# Patient Record
Sex: FEMALE | Race: BLACK OR AFRICAN AMERICAN
[De-identification: names, ages, dates, MRNs, and addresses within clinical notes are randomized per-mention and may not be internally consistent; named-entity substitution may affect disease eponyms.]

---

## 2017-11-16 ENCOUNTER — HOSPITAL ENCOUNTER (INPATIENT)
Dept: HOSPITAL 92 - ERS | Age: 82
LOS: 33 days | Discharge: NURSING, REHAB OR LONG-TERM CARE | DRG: 304 | End: 2017-12-19
Attending: INTERNAL MEDICINE | Admitting: INTERNAL MEDICINE
Payer: MEDICARE

## 2017-11-16 VITALS — BODY MASS INDEX: 50.5 KG/M2

## 2017-11-16 DIAGNOSIS — D64.9: ICD-10-CM

## 2017-11-16 DIAGNOSIS — E87.1: ICD-10-CM

## 2017-11-16 DIAGNOSIS — G93.41: ICD-10-CM

## 2017-11-16 DIAGNOSIS — N17.0: ICD-10-CM

## 2017-11-16 DIAGNOSIS — I16.0: Primary | ICD-10-CM

## 2017-11-16 DIAGNOSIS — F41.9: ICD-10-CM

## 2017-11-16 DIAGNOSIS — E78.5: ICD-10-CM

## 2017-11-16 DIAGNOSIS — I67.4: ICD-10-CM

## 2017-11-16 DIAGNOSIS — Z96.653: ICD-10-CM

## 2017-11-16 DIAGNOSIS — Z91.041: ICD-10-CM

## 2017-11-16 DIAGNOSIS — E86.1: ICD-10-CM

## 2017-11-16 DIAGNOSIS — E53.8: ICD-10-CM

## 2017-11-16 DIAGNOSIS — M10.9: ICD-10-CM

## 2017-11-16 DIAGNOSIS — G45.9: ICD-10-CM

## 2017-11-16 DIAGNOSIS — Z74.01: ICD-10-CM

## 2017-11-16 DIAGNOSIS — I07.1: ICD-10-CM

## 2017-11-16 DIAGNOSIS — T83.511A: ICD-10-CM

## 2017-11-16 DIAGNOSIS — K59.00: ICD-10-CM

## 2017-11-16 DIAGNOSIS — G47.33: ICD-10-CM

## 2017-11-16 DIAGNOSIS — G25.0: ICD-10-CM

## 2017-11-16 DIAGNOSIS — Z91.81: ICD-10-CM

## 2017-11-16 DIAGNOSIS — E66.01: ICD-10-CM

## 2017-11-16 DIAGNOSIS — T42.3X5A: ICD-10-CM

## 2017-11-16 DIAGNOSIS — B95.2: ICD-10-CM

## 2017-11-16 DIAGNOSIS — I95.9: ICD-10-CM

## 2017-11-16 DIAGNOSIS — N18.3: ICD-10-CM

## 2017-11-16 DIAGNOSIS — J44.9: ICD-10-CM

## 2017-11-16 DIAGNOSIS — I50.32: ICD-10-CM

## 2017-11-16 DIAGNOSIS — Z88.7: ICD-10-CM

## 2017-11-16 DIAGNOSIS — M19.90: ICD-10-CM

## 2017-11-16 DIAGNOSIS — F32.9: ICD-10-CM

## 2017-11-16 DIAGNOSIS — E87.5: ICD-10-CM

## 2017-11-16 DIAGNOSIS — I13.0: ICD-10-CM

## 2017-11-16 DIAGNOSIS — R13.12: ICD-10-CM

## 2017-11-16 DIAGNOSIS — N39.0: ICD-10-CM

## 2017-11-16 DIAGNOSIS — M79.7: ICD-10-CM

## 2017-11-16 DIAGNOSIS — R47.81: ICD-10-CM

## 2017-11-16 DIAGNOSIS — R26.89: ICD-10-CM

## 2017-11-16 LAB
ALP SERPL-CCNC: 116 U/L (ref 40–150)
ALT SERPL W P-5'-P-CCNC: 14 U/L (ref 8–55)
ANION GAP SERPL CALC-SCNC: 14 MMOL/L (ref 10–20)
ANISOCYTOSIS BLD QL SMEAR: (no result) (100X)
AST SERPL-CCNC: 22 U/L (ref 5–34)
BACTERIA UR QL AUTO: (no result) HPF
BILIRUB SERPL-MCNC: 0.3 MG/DL (ref 0.2–1.2)
BUN SERPL-MCNC: 28 MG/DL (ref 9.8–20.1)
CALCIUM SERPL-MCNC: 9.1 MG/DL (ref 7.8–10.44)
CHLORIDE SERPL-SCNC: 105 MMOL/L (ref 98–107)
CO2 SERPL-SCNC: 25 MMOL/L (ref 23–31)
CREAT CL PREDICTED SERPL C-G-VRATE: 0 ML/MIN (ref 70–130)
GLOBULIN SER CALC-MCNC: 3.3 G/DL (ref 2.4–3.5)
HCT VFR BLD CALC: 36 % (ref 36–47)
HYALINE CASTS #/AREA URNS LPF: (no result) LPF
MACROCYTES BLD QL SMEAR: (no result) (100X)
PROT UR STRIP.AUTO-MCNC: 100 MG/DL
RBC # BLD AUTO: 3.48 MILL/UL (ref 4.2–5.4)
RBC UR QL AUTO: (no result) HPF (ref 0–3)
SCHISTOCYTES BLD QL SMEAR: (no result) (100X)
TROPONIN I SERPL DL<=0.01 NG/ML-MCNC: 0.02 NG/ML (ref ?–0.03)
WBC # BLD AUTO: 6.1 THOU/UL (ref 4.8–10.8)

## 2017-11-16 PROCEDURE — 93306 TTE W/DOPPLER COMPLETE: CPT

## 2017-11-16 PROCEDURE — 84100 ASSAY OF PHOSPHORUS: CPT

## 2017-11-16 PROCEDURE — 86160 COMPLEMENT ANTIGEN: CPT

## 2017-11-16 PROCEDURE — 93970 EXTREMITY STUDY: CPT

## 2017-11-16 PROCEDURE — 74000: CPT

## 2017-11-16 PROCEDURE — 86376 MICROSOMAL ANTIBODY EACH: CPT

## 2017-11-16 PROCEDURE — 86225 DNA ANTIBODY NATIVE: CPT

## 2017-11-16 PROCEDURE — 84300 ASSAY OF URINE SODIUM: CPT

## 2017-11-16 PROCEDURE — 80069 RENAL FUNCTION PANEL: CPT

## 2017-11-16 PROCEDURE — 84443 ASSAY THYROID STIM HORMONE: CPT

## 2017-11-16 PROCEDURE — 90471 IMMUNIZATION ADMIN: CPT

## 2017-11-16 PROCEDURE — 86235 NUCLEAR ANTIGEN ANTIBODY: CPT

## 2017-11-16 PROCEDURE — 82805 BLOOD GASES W/O2 SATURATION: CPT

## 2017-11-16 PROCEDURE — 90732 PPSV23 VACC 2 YRS+ SUBQ/IM: CPT

## 2017-11-16 PROCEDURE — 81001 URINALYSIS AUTO W/SCOPE: CPT

## 2017-11-16 PROCEDURE — 87186 SC STD MICRODIL/AGAR DIL: CPT

## 2017-11-16 PROCEDURE — 74230 X-RAY XM SWLNG FUNCJ C+: CPT

## 2017-11-16 PROCEDURE — 86140 C-REACTIVE PROTEIN: CPT

## 2017-11-16 PROCEDURE — 83935 ASSAY OF URINE OSMOLALITY: CPT

## 2017-11-16 PROCEDURE — 36416 COLLJ CAPILLARY BLOOD SPEC: CPT

## 2017-11-16 PROCEDURE — 80061 LIPID PANEL: CPT

## 2017-11-16 PROCEDURE — 76770 US EXAM ABDO BACK WALL COMP: CPT

## 2017-11-16 PROCEDURE — 36569 INSJ PICC 5 YR+ W/O IMAGING: CPT

## 2017-11-16 PROCEDURE — 71010: CPT

## 2017-11-16 PROCEDURE — 83930 ASSAY OF BLOOD OSMOLALITY: CPT

## 2017-11-16 PROCEDURE — 82570 ASSAY OF URINE CREATININE: CPT

## 2017-11-16 PROCEDURE — 74170 CT ABD WO CNTRST FLWD CNTRST: CPT

## 2017-11-16 PROCEDURE — 82746 ASSAY OF FOLIC ACID SERUM: CPT

## 2017-11-16 PROCEDURE — G0009 ADMIN PNEUMOCOCCAL VACCINE: HCPCS

## 2017-11-16 PROCEDURE — 83735 ASSAY OF MAGNESIUM: CPT

## 2017-11-16 PROCEDURE — 70450 CT HEAD/BRAIN W/O DYE: CPT

## 2017-11-16 PROCEDURE — 87040 BLOOD CULTURE FOR BACTERIA: CPT

## 2017-11-16 PROCEDURE — 82533 TOTAL CORTISOL: CPT

## 2017-11-16 PROCEDURE — 85025 COMPLETE CBC W/AUTO DIFF WBC: CPT

## 2017-11-16 PROCEDURE — 87086 URINE CULTURE/COLONY COUNT: CPT

## 2017-11-16 PROCEDURE — 87149 DNA/RNA DIRECT PROBE: CPT

## 2017-11-16 PROCEDURE — 82140 ASSAY OF AMMONIA: CPT

## 2017-11-16 PROCEDURE — S0028 INJECTION, FAMOTIDINE, 20 MG: HCPCS

## 2017-11-16 PROCEDURE — 81003 URINALYSIS AUTO W/O SCOPE: CPT

## 2017-11-16 PROCEDURE — 82553 CREATINE MB FRACTION: CPT

## 2017-11-16 PROCEDURE — 80048 BASIC METABOLIC PNL TOTAL CA: CPT

## 2017-11-16 PROCEDURE — 80053 COMPREHEN METABOLIC PANEL: CPT

## 2017-11-16 PROCEDURE — 94660 CPAP INITIATION&MGMT: CPT

## 2017-11-16 PROCEDURE — 93880 EXTRACRANIAL BILAT STUDY: CPT

## 2017-11-16 PROCEDURE — 83835 ASSAY OF METANEPHRINES: CPT

## 2017-11-16 PROCEDURE — 95819 EEG AWAKE AND ASLEEP: CPT

## 2017-11-16 PROCEDURE — 36415 COLL VENOUS BLD VENIPUNCTURE: CPT

## 2017-11-16 PROCEDURE — 87077 CULTURE AEROBIC IDENTIFY: CPT

## 2017-11-16 PROCEDURE — C1751 CATH, INF, PER/CENT/MIDLINE: HCPCS

## 2017-11-16 PROCEDURE — 83090 ASSAY OF HOMOCYSTEINE: CPT

## 2017-11-16 PROCEDURE — 86431 RHEUMATOID FACTOR QUANT: CPT

## 2017-11-16 PROCEDURE — 70551 MRI BRAIN STEM W/O DYE: CPT

## 2017-11-16 PROCEDURE — 95816 EEG AWAKE AND DROWSY: CPT

## 2017-11-16 PROCEDURE — 82607 VITAMIN B-12: CPT

## 2017-11-16 PROCEDURE — 81015 MICROSCOPIC EXAM OF URINE: CPT

## 2017-11-16 PROCEDURE — 84439 ASSAY OF FREE THYROXINE: CPT

## 2017-11-16 PROCEDURE — 93010 ELECTROCARDIOGRAM REPORT: CPT

## 2017-11-16 PROCEDURE — A4216 STERILE WATER/SALINE, 10 ML: HCPCS

## 2017-11-16 PROCEDURE — 94640 AIRWAY INHALATION TREATMENT: CPT

## 2017-11-16 PROCEDURE — 84484 ASSAY OF TROPONIN QUANT: CPT

## 2017-11-16 PROCEDURE — 93005 ELECTROCARDIOGRAM TRACING: CPT

## 2017-11-16 PROCEDURE — 86780 TREPONEMA PALLIDUM: CPT

## 2017-11-16 PROCEDURE — 83520 IMMUNOASSAY QUANT NOS NONAB: CPT

## 2017-11-16 NOTE — PDOC.EVN
Event Note





- Event Note


Event Note: 





Patient seen and examined. dictation note to follow

## 2017-11-16 NOTE — CT
CT HEAD NONCONTRAST

11/16/17

 

HISTORY: 

Altered mental status. 

 

COMPARISON:  

8/3/15.

 

FINDINGS:  

There is no evidence of acute intracranial hemorrhage or infarct. Diffuse cortical atrophy and chroni
c ischemic small vessel disease are similar in appearance to the previous exam. There is no mass effe
ct of shift of midline structures. The visualized paranasal sinuses remain well aerated. 

 

IMPRESSION:  

No acute intracranial abnormalities are demonstrated on noncontrast CT head. 

 

POS: [facility]

## 2017-11-17 LAB
CHOLEST SERPL-MCNC: 159 MG/DL
LDLC SERPL CALC-MCNC: 95 MG/DL

## 2017-11-17 RX ADMIN — Medication SCH ML: at 09:18

## 2017-11-17 RX ADMIN — Medication SCH ML: at 20:55

## 2017-11-17 RX ADMIN — ALBUTEROL SULFATE PRN MG: 1.25 SOLUTION RESPIRATORY (INHALATION) at 10:06

## 2017-11-17 RX ADMIN — CYANOCOBALAMIN TAB 1000 MCG SCH MCG: 1000 TAB at 09:15

## 2017-11-17 RX ADMIN — GUAIFENESIN PRN MG: 200 SOLUTION ORAL at 20:47

## 2017-11-17 RX ADMIN — GUAIFENESIN PRN MG: 200 SOLUTION ORAL at 09:30

## 2017-11-17 NOTE — DIS
DATE OF ADMISSION:  11/16/2017

 

DATE OF DISCHARGE:  11/17/2017

 

PRIMARY CARE PROVIDER:  Dr. Lias Avila.

 

DISCHARGE DISPOSITION:  Home.

 

FINAL DIAGNOSES:  Transient ischemic attack, resolved; essential hypertension; asthma; falls; dyslipi
demia; chronic kidney disease stage 3.

 

DISCHARGE MEDICATIONS:  Clonidine 0.12 mg 3 times a day, Pepcid 20 mg a day, hydrochlorothiazide 25 m
g a day, allopurinol 300 mg a day, Norvasc 5 mg a day, Coreg 6.25 mg twice a day, Lipitor 10 mg a day
, Cymbalta 60 mg a day, aspirin 325 mg a day, primidone 250 mg twice a day, losartan 100 mg a day, Xo
penex 1.25 nebs q.8 hours p.r.n., spironolactone 25 mg a day.

 

ALLERGIES:  ZOSTER VACCINE, IODINE.

 

CODE STATUS:  FULL.

 

HOSPITAL COURSE:  The patient with multiple falls recently, noticed to have some slurred speech.  In 
the emergency room, she had unremarkable CT of the brain.  Her neurological exam was normal.  Laborat
ory revealed a comp metabolic profile abnormalities, glucose 112, creatinine 1.15, BUN 28.  CBC:  Whi
te count 6.1, hemoglobin 11.3, platelet count 200,000.  Patient is alert and oriented.  Neurological 
exam was intact.  MRI shows no acute abnormality.  Carotid ultrasound shows no stenosis.  I have disc
ussed the situation with her and her family and she is being discharged with follow up with Dr. Fraser
ic in 7 days.  Consult was put into rehab for consideration of strengthening PT, OT; however, with he
r managed Medicare, this evaluation will not be complete until 5 days hence of this.  Discussed that 
she has no admitting criteria and the fact that she can be admitted to the rehab from home.  The scre
en is already in place.  I have discussed with the family.  Follow up with Dr. Avila for an attempt
 to get her into rehab.

 

CONSULTATIONS:  None.

 

PROCEDURES:  None.

## 2017-11-17 NOTE — ULT
BILATERAL CAROTID DUPLEX ULTRASOUND:

 

Date:  11/17/17 

 

HISTORY:  

TIA.  

 

FINDINGS: 

 

Real-time color Doppler evaluation of the right and left carotid systems was performed. 

 

On the right side, peak systolic velocities of the common carotid were 73 cm/second. Internal carotid
 velocities were 59 cm/second and external carotid velocities were 40 cm/second. 

 

On the left side, peak systolic velocities of the common carotid were 81  cm/second. Internal carotid
 velocities were 66 cm/second and external carotid velocities were 54 cm/second. 

 

Vertebral flow antegrade bilaterally. 

 

IMPRESSION: 

No evidence of hemodynamically significant stenosis of either internal carotid artery. 

 

 

POS: [facility]

## 2017-11-17 NOTE — MRI
EXAM:

BRAIN MRI WITHOUT CONTRAST:

 

HISTORY: 

Transient ischemic attack.  Altered mental status.

 

COMPARISON: 

None.

 

TECHNIQUE: 

Brain MRI is performed without intravenous Gadolinium administration.  Multisequential, multiplanar i
maging is performed.

 

FINDINGS: 

There is no hemorrhage on the axial gradient echo sequence.  

 

There is MR evidence of hyperostosis frontalis interna.

 

Midline brain parenchymal structures are unremarkable.

 

No parenchymal mass, mass effect, or midline shift.  Brain volume is age appropriate.  Cortical gray-
white matter differentiation is preserved.

 

Ventricles and sulci are patent and symmetric.

 

Central arterial flow voids are maintained.  Absent restricted diffusion.

 

T2 and FLAIR white matter hyperintensities due to chronic small-vessel ischemic changes of the white 
matter are noted.

 

Note is made of a partially empty sella.

 

IMPRESSION: 

1.  No acute intracranial process.

2.  Absent restricted diffusion.  No acute infarct.

3.  Age-appropriate brain volume.

4.  Chronic small-vessel ischemic change of the white matter present.

 

POS: [facility]

## 2017-11-17 NOTE — HP
DATE OF SERVICE:  11/16/2017

 

PRIMARY CARE PHYSICIAN:  Lisa Avila MD

 

REASON FOR ADMISSION:  TIA, rule out cerebrovascular accident.  Hypertension, 
uncontrolled.

 

HISTORY OF PRESENT ILLNESS:  An 83-year-old -American female who lives 
at home.  She is mostly bed bound.  She is only able to ambulate with a walker.
  For the last two weeks, she has 3 episodes of fall at different times, most 
of the time fall happens during nighttime when she goes to restroom.

 

Last Tuesday, patient had similar fall at home and subsequently patient's 
daughter noticed that she was incoherent and she was having slurred speech and 
that is why they made appointment with Dr. Avila.  Patient did not have any 
testing done and today after work, patient's daughter noticed that her 
condition was getting worse and she was having more slurred speech and she was 
more incoherent and that is why she brought her to the emergency room for 
evaluation.  In the emergency room, she had CT brain which was negative.  When 
I saw this patient at that time, the patient's speech was normal.  The patient 
did not have any focal motor weakness, but she was weak all over.  She denied 
any headaches.  She denied any chest pain, palpitation, shortness of breath.  
In the emergency room, she was hypertension.  The patient's family member was 
worried about her weakness, frequent falls and stroke.  The patient's family 
members are also worried about her cognitive status which is gradually 
declining.  She is becoming more and more forgetful.

 

Patient does not have any urinary tract infection symptoms.  She does not have 
any constipation, diarrhea, melena, hematochezia.  She does not have any fever 
or chills.  She denies any sore throat or flu-like illness.

 

ALLERGIES:  IODINE, ZOSTER VACCINE.

 

CURRENT HOME MEDICATIONS:  Aspirin 81 mg p.o. daily, clonidine 0.1 mg as needed
, Cymbalta 60 mg p.o. daily, losartan 100 mg p.o. daily, primidone 250 mg p.o. 
b.i.d., Aldactone 25 mg p.o. daily, amlodipine 5 mg p.o. daily, Coreg 6.25 mg 
p.o. b.i.d., clonidine 0.2 mg 3 times daily, Pepcid 20 mg daily.  Xopenex 
inhaler as needed, Lipitor 10 mg p.o. daily, hydrochlorothiazide 25 mg p.o. 
daily, allopurinol 300 mg p.o. daily.

 

REVIEW OF SYSTEMS:  The following complete review of systems was negative, 
unless otherwise mentioned in the HPI or below:

Constitutional:  Weight loss or gain, ability to conduct usual activities.  Skin
:  Rash, itching.  Eyes:  Double vision, pain.  ENT/Mouth:  Nose bleeding, neck 
stiffness, pain, tenderness.  Cardiovascular:  Palpitations, dyspnea on exertion
, orthopnea.  Respiratory:  Shortness of breath, wheezing, cough, hemoptysis, 
fever or night sweats.  Gastrointestinal:  Poor appetite, abdominal pain, 
heartburn, nausea, vomiting, constipation, or diarrhea.  Genitourinary:  Urgency
, frequency, dysuria, nocturia.  Musculoskeletal:  Pain, swelling.  Neurologic/
Psychiatric:  Anxiety, depression.  Allergy/Immunologic:  Skin rash, bleeding 
tendency.

 

Please see my HPI for pertinent positives and negatives.  All other review of 
systems reviewed and negative except as mentioned in the HPI.

 

PAST MEDICAL HISTORY:  Recurrent urinary tract infection, fibromyalgia, benign 
essential tremor, osteoarthritis, morbid obesity, hypertension, asthma, chronic 
bronchitis, chronic diastolic heart failure, obstructive sleep apnea, chronic 
kidney disease stage III, history of CVA, chronic physical deconditioning.

 

PAST SURGICAL HISTORY:  Hysterectomy, cholecystectomy, bilateral knee 
replacement, right shoulder surgery.

 

PAST PSYCHIATRIC HISTORY:  Anxiety and depression.

 

SOCIAL HISTORY:  Patient drinks alcohol occasionally.  She denies any smoking.  
She denies any other illicit drug abuse.  She lives at home with the family.



FAMILY HISTORY: No strong family history of CAD, CVA or cancer.

 

EMERGENCY ROOM COURSE:  Patient is given clonidine 0.1 mg.

 

PHYSICAL EXAMINATION:

VITAL SIGNS:  On arrival, blood pressure 200/77, pulse 84, respiratory rate 16, 
temperature 98.2, saturation 100% on room air, weight 111.1 kilograms.

GENERAL:  Patient is currently alert, awake, weak.  No obvious acute distress.

HEENT:  Normocephalic, atraumatic.  Eyes:  Pupils round, reactive to light.  
Extraocular muscle intact.  ENT:  Oropharynx within normal limits.  Moist 
mucous membranes.  No oral lesions.  No pharyngeal erythema, no exudate.

NECK:  Supple.  Range of motion is normal.  No meningeal signs of irritation.

LUNGS:  Clear to auscultation without any rhonchi or rales.

CARDIAC:  S1, S2 regular without any murmur.

ABDOMEN:  Soft, bowel sounds present, nontender, nondistended.  No organomegaly
, no mass, no suprapubic tenderness.

BACK:  Unremarkable, no CVA tenderness.

EXTREMITIES:  Upper extremity passive movement of all joints are normal.  Lower 
extremities:  No edema.  Good peripheral pulsation.

SKIN:  No skin rash.

HEMATOLOGICAL SYSTEM:  No lymphadenopathy.

PSYCHIATRIC:  Normal affect.

NEUROLOGIC:  Patient is alert and oriented x3.  Cranial nerves II-XII intact.  
Motor 5/5 in all four limbs.  Sensation is intact.  No pronator drift noted.  
Reflexes symmetrical.  No cerebellar sign.  Plantar bilateral flexor.  Grossly 
nonfocal neurological examination.

 

SIGNIFICANT LABORAROTORY DATA:  EKG based on my review, first degree AV block, 
no change from previous CT brain based on my review, no acute intracranial 
process.  CBC:  WBC 6.1, hemoglobin 11.3, platelet 200, .  BMP:  Sodium 
139, potassium 4.5, chloride 105, carbon dioxide 25, anion gap 14, BUN 28, 
creatinine 1.15, glucose 112, calcium 9.1.

 

LFT:  AST 22, ALT 14, alkaline phosphatase 116, albumin 3.3, CK-MB 1.1, 
troponin I 0.016.  Urinalysis:  Leukocyte esterase trace.  WBC 11-20.

 

ASSESSMENT AND PLAN:

1.  Transient ischemic attack, rule out cerebrovascular accident.  This patient'
s family member reported that she was incoherent and she was having slurred 
speech, but currently her speech is normal and she is mildly incoherent from 
her chronic cognitive deficit.  Her CT brain is negative.  All symptoms started 
on Tuesday and still CT brain is negative, so unlikely to be stroke, but family 
member was over apprehensive and they are worried about stroke and that is why 
we will keep this patient in the hospital for observation.  We will do neuro 
check every 4 hourly.  We will obtain MRI brain, carotid ultrasound, and 
echocardiography as a part of stroke workup.  We will check lipid profile, 
homocysteine, and RPR testing tomorrow.  We will also consult PT, OT while in 
hospital and we will also consult Rehab for rehab screening.

2.  Generalized weakness and chronic physical deconditioning.  The patient does 
have chronic physical deconditioning and that is why she will need PT, OT.  I 
will consult for rehab for inpatient evaluation.  If patient qualifies for 
inpatient rehabilitation, then family member and patient has agreed to go to 
rehab placement.

3.  Cognitive dysfunction.  The patient has macrocytosis.  I will check B12, 
folate, RPR.  Patient's CT brain is negative.  We are going to do MRI.  I am 
suspecting from chronic ischemic white matter changes from uncontrolled 
hypertension.  Patient may be getting senile dementia.  Patient will need 
outpatient followup.  We will start folic acid and vitamin B12 therapy and will 
try to control underlying problems with hypertension.

4.  Hypertension with hypertensive urgency.  We will control blood pressure 
with patient's home medication.  We will continue amlodipine 5 mg p.o. daily, 
Coreg 6.25 mg p.o. b.i.d., clonidine 0.2 mg 3 times daily and losartan 100 mg 
p.o. daily, hydrochlorothiazide 25 mg p.o. daily.

5.  Benign essential tremor.  We will continue primidone as per home dosage.

6.  Asthma.  We will continue albuterol nebulization as needed basis.

7.  Anxiety and depression.  We will continue Cymbalta 60 mg p.o. daily.

8.  Dyslipidemia.  Check lipid profile tomorrow and continue Lipitor 10 mg p.o. 
at bedtime.

9.  Frequent fall.  Patient will need PT, OT and possible rehab placement.

10.  Gout.  We will continue allopurinol 300 mg p.o. daily.

11.  Morbid obesity with BMI 39, weight loss education given.  Healthy 
lifestyle measures discussed with the patient.

12.  Deep venous thrombosis prophylaxis.  Lovenox 40 mg subcu daily.

13.  Gastrointestinal prophylaxis, Pepcid 20 mg p.o. b.i.d.

14.  Code status:  The patient is FULL CODE.  Patient's daughter is surrogate 
decision maker.

 

Disposition plan based on clinical course, likely within 24 hours.

 

MTDD

## 2017-11-17 NOTE — PDOC.PN
- Subjective


Encounter Start Date: 11/17/17


Encounter Start Time: 07:30


Subjective: alert, oriented, only CO -falls





- Objective


Resuscitation Status: 


 











Resuscitation Status           FULL:Full Resuscitation














MAR Reviewed: Yes


Vital Signs & Weight: 


 Vital Signs (12 hours)











  Temp Pulse Resp BP BP Pulse Ox


 


 11/17/17 06:45      168/73 H 


 


 11/17/17 05:35     201/86 H  


 


 11/17/17 04:00  97.3 F L  76  18   201/86 H  100


 


 11/17/17 01:56      171/95 H 


 


 11/17/17 01:09       99


 


 11/17/17 00:48  97.5 F L  77  20   227/109 H  99








 Weight











Weight                         250 lb














I&O: 


 











 11/16/17 11/17/17 11/18/17





 06:59 06:59 06:59


 


Intake Total  240 


 


Balance  240 











Result Diagrams: 


 11/16/17 21:22





 11/16/17 21:22





Phys Exam





- Physical Examination


Constitutional: NAD


Neck: no JVD


Respiratory: clear to auscultation bilateral


Cardiovascular: RRR, no significant murmur


Gastrointestinal: soft, positive bowel sounds


Musculoskeletal: edema present


Neurological: non-focal





Dx/Plan


(1) TIA (transient ischemic attack)


Status: Acute   





(2) Falls


Code(s): W19.XXXA - UNSPECIFIED FALL, INITIAL ENCOUNTER   Status: Acute   


Qualifiers: 


   Encounter type: initial encounter   Qualified Code(s): W19.XXXA - 

Unspecified fall, initial encounter   





(3) HTN (hypertension)


Code(s): I10 - ESSENTIAL (PRIMARY) HYPERTENSION   Status: Chronic   


Qualifiers: 


   Hypertension type: essential hypertension   Qualified Code(s): I10 - 

Essential (primary) hypertension   





(4) Dyslipidemia


Code(s): E78.5 - HYPERLIPIDEMIA, UNSPECIFIED   Status: Chronic   





- Plan


cont ASA, statin


-: MRI, carotid US pending


-: REHAB ?





* .

## 2017-11-18 RX ADMIN — ALBUTEROL SULFATE PRN MG: 1.25 SOLUTION RESPIRATORY (INHALATION) at 16:12

## 2017-11-18 RX ADMIN — Medication SCH ML: at 08:36

## 2017-11-18 RX ADMIN — CYANOCOBALAMIN TAB 1000 MCG SCH MCG: 1000 TAB at 08:35

## 2017-11-18 RX ADMIN — Medication SCH ML: at 21:04

## 2017-11-18 NOTE — PDOC.PN
- Subjective


Encounter Start Date: 11/18/17


Encounter Start Time: 15:00


Subjective: feels well but still has dizzy spells and headcahe.


-: feels paraesthesias of fingers





- Objective


Resuscitation Status: 


 











Resuscitation Status           FULL:Full Resuscitation














MAR Reviewed: Yes


Vital Signs & Weight: 


 Vital Signs (12 hours)











  Temp Pulse Pulse Pulse Resp BP BP


 


 11/18/17 11:40  97.6 F  71    16  


 


 11/18/17 10:19   68     236/102 H 


 


 11/18/17 09:55    79  82    212/104 H


 


 11/18/17 08:35   68     194/84 H 


 


 11/18/17 08:34       194/84 H 


 


 11/18/17 08:33       194/84 H 


 


 11/18/17 08:00  98.4 F  68    20  


 


 11/18/17 07:42  98.4 F  68    20  


 


 11/18/17 06:17       186/81 H 


 


 11/18/17 06:06  98.5 F  73    18  














  BP BP Pulse Ox


 


 11/18/17 11:40   192/80 H  96


 


 11/18/17 10:19   


 


 11/18/17 09:55  190/90 H  


 


 11/18/17 08:35   


 


 11/18/17 08:34   


 


 11/18/17 08:33   


 


 11/18/17 08:00   


 


 11/18/17 07:42   194/84 H  96


 


 11/18/17 06:17   


 


 11/18/17 06:06   186/81 H  96








 Weight











Weight                         250 lb














I&O: 


 











 11/17/17 11/18/17 11/19/17





 06:59 06:59 06:59


 


Intake Total 240 240 


 


Balance 240 240 











Result Diagrams: 


 11/16/17 21:22





 11/16/17 21:22


Additional Labs: 





 Laboratory Tests











  07/31/15 11/16/17 11/17/17





  08:21 21:22 04:52


 


Creatinine  1.10  1.15 H 


 


Triglycerides    74


 


Cholesterol    159


 


LDL Cholesterol, Calc    95


 


HDL Cholesterol    49


 


Vitamin B12   


 


Folate   


 


Homocysteine   


 


Syphilis IgG/IgM Ab   














  11/17/17 11/17/17 11/17/17





  04:52 04:52 04:52


 


Creatinine   


 


Triglycerides   


 


Cholesterol   


 


LDL Cholesterol, Calc   


 


HDL Cholesterol   


 


Vitamin B12  287  


 


Folate  9.60  


 


Homocysteine   10.95 


 


Syphilis IgG/IgM Ab    Nonreactive














Phys Exam





- Physical Examination


Constitutional: NAD


HEENT: PERRLA, moist MMs, sclera anicteric, oral pharynx no lesions


Neck: no nodes, no JVD, supple, full ROM


Respiratory: no wheezing, no rales, no rhonchi, clear to auscultation bilateral


Cardiovascular: RRR, no significant murmur


Gastrointestinal: soft, non-tender, no distention, positive bowel sounds


Musculoskeletal: no edema, pulses present


Neurological: non-focal, normal sensation, moves all 4 limbs


Psychiatric: normal affect, A&O x 3


Skin: no rash





Dx/Plan


(1) TIA (transient ischemic attack)


Status: Acute   





(2) Dyslipidemia


Code(s): E78.5 - HYPERLIPIDEMIA, UNSPECIFIED   Status: Chronic   





(3) HTN (hypertension)


Code(s): I10 - ESSENTIAL (PRIMARY) HYPERTENSION   Status: Chronic   


Qualifiers: 


   Hypertension type: essential hypertension   Qualified Code(s): I10 - 

Essential (primary) hypertension   





- Plan


plan discussed w/ family, out of bed/ambulate, DVT proph w/SCDs


BP still dangerously high.high risk of stroke and death.not stable for DC 


-: Increase Clonidine to 0.3 TID. Amlodipine increased yesterday


-: monitor. 


-: check am labs.


-: Will change to inpatinet with HTN urgency





* .








Review of Systems





- Review of Systems


Constitutional: Weakness.  negative: Fever, Chills, Sweats, Malaise, Other


Respiratory: negative: Cough, Dry, Shortness of Breath, Hemoptysis, SOB with 

Excertion, Pleuritic Pain, Sputum, Wheezing


Cardiovascular: negative: Chest Pain, Palpitations, Orthopnea, Paroxysmal Noc. 

Dyspnea, Edema, Light Headedness, Other


Gastrointestinal: negative: Nausea, Vomiting, Abdominal Pain, Diarrhea, 

Constipation, Melena, Hematochezia, Other


Genitourinary: negative: Dysuria, Frequency, Incontinence, Hematuria, Retention

, Other


Musculoskeletal: negative: Neck Pain, Shoulder Pain, Arm Pain, Back Pain, Hand 

Pain, Leg Pain, Foot Pain, Other


Neurological: negative: Weakness, Numbness, Incoordination, Change in Speech, 

Confusion, Seizures, Other





- Medications/Allergies


Allergies/Adverse Reactions: 


 Allergies











Allergy/AdvReac Type Severity Reaction Status Date / Time


 


iodine Allergy Severe Hives Verified 01/13/14 00:20


 


zoster vaccine live Allergy Severe Anaphylaxis Verified 01/13/14 00:20





[From ZOSTAVAX (PF)]     











Medications: 


 Current Medications





Acetaminophen (Tylenol)  650 mg PO Q4H PRN


   PRN Reason: Headache/Fever or Pain


Hydrocodone Bitart/Acetaminophen (Norco 5/325)  1 tab PO Q4H PRN


   PRN Reason: Moderate Pain (4-6)


Al Hydroxide/Mg Hydroxide (Maalox)  30 ml PO Q6H PRN


   PRN Reason: Heartburn  or Indigestion


Albuterol Sulfate (Albuterol Sulfate)  1.25 mg NEB Q8H PRN


   PRN Reason:  SOB


   Last Admin: 11/17/17 10:06 Dose:  1.25 mg


Allopurinol (Zyloprim)  300 mg PO DAILY [facility]


   Last Admin: 11/18/17 08:34 Dose:  300 mg


Amlodipine Besylate (Norvasc)  5 mg PO BID [facility]


   Last Admin: 11/18/17 08:35 Dose:  5 mg


Aspirin (Ecotrin)  325 mg PO DAILY [facility]


   Last Admin: 11/18/17 08:34 Dose:  325 mg


Atorvastatin Calcium (Lipitor)  10 mg PO HS [facility]


   Last Admin: 11/17/17 20:45 Dose:  10 mg


Carvedilol (Coreg)  6.25 mg PO BID-[facility]


   Last Admin: 11/18/17 08:33 Dose:  6.25 mg


Clonidine (Catapres)  0.1 mg PO Q4H PRN


   PRN Reason: Systolic BP > 180


   Last Admin: 11/18/17 06:17 Dose:  0.1 mg


Clonidine (Catapres)  0.3 mg PO TID [facility]


Cyanocobalamin (Vitamin B-12)  1,000 mcg PO DAILY [facility]


   Last Admin: 11/18/17 08:35 Dose:  1,000 mcg


Duloxetine HCl (Cymbalta)  60 mg PO DAILY [facility]


   Last Admin: 11/18/17 08:32 Dose:  60 mg


Enoxaparin Sodium (Lovenox)  40 mg SC 0900 [facility]


   Last Admin: 11/18/17 08:35 Dose:  40 mg


Famotidine (Pepcid)  20 mg PO BID [facility]


   Last Admin: 11/18/17 08:31 Dose:  20 mg


Folic Acid (Folvite)  1 mg PO DAILY [facility]


   Last Admin: 11/18/17 08:32 Dose:  1 mg


Guaifenesin (Robitussin Sf)  200 mg PO Q4H PRN


   PRN Reason: Cough


   Last Admin: 11/17/17 20:47 Dose:  200 mg


Hydralazine HCl (Apresoline)  10 mg SLOW IVP Q4H PRN


   PRN Reason: Systolic BP > 180


   Last Admin: 11/18/17 10:19 Dose:  10 mg


Hydrochlorothiazide (Hydrochlorothiazide)  25 mg PO DAILY [facility]


   Last Admin: 11/18/17 08:34 Dose:  25 mg


Loperamide HCl (Imodium)  2 mg PO PRN PRN


   PRN Reason: Diarrhea/Loose Stools


Loratadine (Claritin)  10 mg PO DAILYPRN PRN


   PRN Reason: Sinus Symptoms


Losartan Potassium (Cozaar)  100 mg PO DAILY [facility]


   Last Admin: 11/18/17 08:32 Dose:  100 mg


Magnesium Hydroxide (Milk Of Magnesium)  30 ml PO DAILYPRN PRN


   PRN Reason: Constipation


Ondansetron HCl (Zofran Odt)  4 mg PO Q6H PRN


   PRN Reason: Nausea/Vomiting


Ondansetron HCl (Zofran)  4 mg IVP Q6H PRN


   PRN Reason: Nausea/Vomiting


Primidone (Mysoline)  250 mg PO TID [facility]


   Last Admin: 11/18/17 10:20 Dose:  250 mg


Senna (Senokot)  2 tab PO HSPRN PRN


   PRN Reason: Constipation


Sodium Chloride (Ocean Nasal Spray 0.65%)  0 ml EA NARE QIDPRN PRN


   PRN Reason: Nasal Congestion


Sodium Chloride (Flush - Normal Saline)  10 ml IVF Q12HR [facility]


   Last Admin: 11/18/17 08:36 Dose:  10 ml


Sodium Chloride (Flush - Normal Saline)  10 ml IVF PRN PRN


   PRN Reason: Saline Flush


Spironolactone (Aldactone)  25 mg PO QAM-WM [facility]


   Last Admin: 11/18/17 08:34 Dose:  25 mg


Zolpidem Tartrate (Ambien)  5 mg PO HSPRN PRN


   PRN Reason: Insomnia

## 2017-11-19 LAB
ANION GAP SERPL CALC-SCNC: 12 MMOL/L (ref 10–20)
BUN SERPL-MCNC: 24 MG/DL (ref 9.8–20.1)
CALCIUM SERPL-MCNC: 9.2 MG/DL (ref 7.8–10.44)
CHLORIDE SERPL-SCNC: 102 MMOL/L (ref 98–107)
CO2 SERPL-SCNC: 29 MMOL/L (ref 23–31)
CREAT CL PREDICTED SERPL C-G-VRATE: 78 ML/MIN (ref 70–130)
HYALINE CASTS #/AREA URNS LPF: (no result) LPF
PROT UR STRIP.AUTO-MCNC: 100 MG/DL
RBC UR QL AUTO: (no result) HPF (ref 0–3)
WBC UR QL AUTO: (no result) HPF (ref 0–3)

## 2017-11-19 RX ADMIN — ALBUTEROL SULFATE PRN MG: 1.25 SOLUTION RESPIRATORY (INHALATION) at 07:15

## 2017-11-19 RX ADMIN — CYANOCOBALAMIN TAB 1000 MCG SCH MCG: 1000 TAB at 08:17

## 2017-11-19 RX ADMIN — Medication SCH ML: at 08:17

## 2017-11-19 RX ADMIN — Medication SCH ML: at 21:10

## 2017-11-19 RX ADMIN — HYDROCODONE BITARTRATE AND ACETAMINOPHEN PRN TAB: 5; 325 TABLET ORAL at 08:03

## 2017-11-19 NOTE — CON
DATE OF CONSULTATION:  11/19/2017

 

REASON FOR CONSULTATION:  Uncontrolled hypertension.

 

PATIENT OF:  Dr. Cr Stack.

 

HISTORY OF PRESENT ILLNESS:  Ms. Leach is an 83-year-old woman.  She was brought to the hospital and
 seen here on 11/17/2017, with a possible transient ischemia attack and uncontrolled hypertension.  I
t stated that the patient is mostly bedbound, ambulating with a walker and she was falling multiple t
imes.  Family noted at that time she was incoherent and had slurred speech.

 

She was brought to the emergency room for further evaluation.

 

ALLERGIES:  The patient is allergic to IODINE and ZOSTER VACCINE.

 

MEDICATIONS:

1.  Aspirin.

2.  Clonidine 0.1 mg as needed.

3.  Losartan 100 mg daily.

4.  Primidone.

5.  Coreg 6.25 mg twice a day.

6.  Clonidine 0.2 mg 3 times a day.

7.  Pepcid 20 mg a day.

8.  Lipitor 10 mg daily.

9.  Hydrochlorothiazide.

10.  Allopurinol.

 

REVIEW OF SYSTEMS:

Constitutional:  No significant weight gain or loss and she is very overweight.  Vision:  No changes.
  Hearing:  No changes.  Pulmonary:  No cough or wheezing.  Gastrointestinal:  No nausea, vomiting, d
iarrhea.  Skin:  No rashes.  Neurologic:  No unilateral weakness or numbness.

 

HOSPITAL COURSE:  The patient apparently was thought not to have a transient ischemic attack after ev
aluation, but she has very labile blood pressure with difficult to control pressure.  Examination of 
blood pressure has been extremely variable at 184/76 and was then over 200 systolic.  On 11/17, it wa
s 227/109.  On examination now, the patient is very sleepy.  It seems like apparently she gets much s
leep here after the dose of clonidine 0.3 mg, apparently this is ordered 0.3 mg 3 times a day.

 

PHYSICAL EXAMINATION:

GENERAL:  She is awake since she goes to sleep quickly.  We came back in a few minutes later, we got 
all the lights on, the nurses around and she woke up more completely and said she felt okay, had no c
hest pain or pressure.

LUNGS:  Clear.

CARDIAC:  Normal S1 and S2.

ABDOMEN:  Soft, nontender.

EXTREMITIES:  No clubbing or cyanosis.  There is mild to moderate edema.

SKIN:  Warm and dry.

PSYCHIATRIC:  Mood and affect are normal.

NEUROLOGIC:  Moves all extremities.

 

PERTINENT LABORATORY:  Creatinine 1.15, LDL 95.  Patient has also had cardiac catheterization done in
 the past, showing no obstructive coronary artery disease.

 

ASSESSMENT:  Labile hypertension.  She is very sleepy now, probably related to clonidine I suspect.

 

PLAN:

1.  Change from losartan to Benicar, stronger antigens receptor blocker.

2.  Agree with increasing amlodipine.

3.  Agree with increasing carvedilol.

4.  We will need to stop the clonidine now until she wakes up, may be resumed at a lower dose if need
ed.  Dr. Stack will resume care tomorrow.

## 2017-11-19 NOTE — PDOC.PN
- Subjective


Encounter Start Date: 11/19/17


Encounter Start Time: 10:00


Subjective: FEEL BETTER TODAY , NO NEW COMPLAINTS BM THIS MORNING





- Objective


MAR Reviewed: Yes


Vital Signs & Weight: 


 Vital Signs (12 hours)











  Temp Pulse Resp BP Pulse Ox


 


 11/19/17 08:00  98.4 F  85  16  182/96 H  96


 


 11/19/17 07:15   85  18   98


 


 11/19/17 04:16  98.1 F  75  18  146/76 H  95


 


 11/19/17 02:16   81   133/71 


 


 11/19/17 00:00  98.2 F  68  16  189/83 H  98








 Weight











Weight                         252 lb 4.8 oz














I&O: 


 











 11/18/17 11/19/17 11/20/17





 06:59 06:59 06:59


 


Intake Total  1005 


 


Output Total  950 


 


Balance  55 











Result Diagrams: 


 11/16/17 21:22





 11/19/17 04:41





Phys Exam





- Physical Examination


Constitutional: NAD


HEENT: PERRLA, moist MMs, sclera anicteric


Neck: supple, full ROM


Respiratory: no wheezing, no rhonchi


Cardiovascular: RRR


Gastrointestinal: soft, non-tender, positive bowel sounds


Musculoskeletal: edema present


Neurological: non-focal, moves all 4 limbs


Psychiatric: normal affect, A&O x 3


Deviation from normal: MX BRUISES 





Dx/Plan


(1) Falls


Code(s): W19.XXXA - UNSPECIFIED FALL, INITIAL ENCOUNTER   Status: Acute   


Qualifiers: 


   Encounter type: initial encounter   Qualified Code(s): W19.XXXA - 

Unspecified fall, initial encounter   





(2) TIA (transient ischemic attack)


Status: Acute   





(3) Dyslipidemia


Code(s): E78.5 - HYPERLIPIDEMIA, UNSPECIFIED   Status: Chronic   





(4) HTN (hypertension)


Code(s): I10 - ESSENTIAL (PRIMARY) HYPERTENSION   Status: Chronic   


Qualifiers: 


   Hypertension type: essential hypertension   Qualified Code(s): I10 - 

Essential (primary) hypertension   





- Plan


cont current plan of care, plan discussed w/ family, PT/OT


WILL HAVE HERE CARDIOLOGIST DR. BARROSO REVIEW BP MEDS


-: AS PATIENT HAS LABILE BP AND H/O MX FALLS.





* .

## 2017-11-20 RX ADMIN — HYDROCODONE BITARTRATE AND ACETAMINOPHEN PRN TAB: 5; 325 TABLET ORAL at 08:34

## 2017-11-20 RX ADMIN — CYANOCOBALAMIN TAB 1000 MCG SCH MCG: 1000 TAB at 08:32

## 2017-11-20 RX ADMIN — HYDROCODONE BITARTRATE AND ACETAMINOPHEN PRN TAB: 5; 325 TABLET ORAL at 01:32

## 2017-11-20 RX ADMIN — HYDROCODONE BITARTRATE AND ACETAMINOPHEN PRN TAB: 5; 325 TABLET ORAL at 14:40

## 2017-11-20 RX ADMIN — Medication SCH ML: at 20:23

## 2017-11-20 RX ADMIN — HYDROCODONE BITARTRATE AND ACETAMINOPHEN PRN TAB: 5; 325 TABLET ORAL at 21:35

## 2017-11-20 RX ADMIN — Medication SCH ML: at 08:41

## 2017-11-20 NOTE — PRG
DATE OF SERVICE:  11/20/2017

 

SUBJECTIVE:  Ms. aPtel blood pressure continues to be elevated.  She recently was admitted for TIA-
like symptoms, but likely represented a hypertensive crisis and encephalopathy.

 

OBJECTIVE: 

VITAL SIGNS:  Blood pressure 186/80, pulse 85, temperature 98.5.

LUNGS:  Clear to auscultation.

CARDIAC:  Regular rate and rhythm.

ABDOMEN:  Soft, nontender, nondistended.

EXTREMITIES:  No edema.

 

MEDICATIONS:  Current blood pressure medications include Norvasc 5 mg b.i.d., carvedilol 25 b.i.d., c
lonidine 0.3 mg one p.o. t.i.d., losartan 100 mg q.a.m., hydrochlorothiazide 25 daily, and spironolac
tone 25 daily.

 

IMPRESSION:  Hypertensive crisis.

 

RECOMMENDATIONS:

1.  Add hydralazine and also decreasing clonidine.  She has had intermittent somnolence.

2.  Consider increasing hydralazine or adding minoxidil.

3.  CK secondary cause.  We will check her urine metanephrines in addition to a CT scan to assess for
 renal artery stenosis.

## 2017-11-20 NOTE — PDOC.PN
- Subjective


Encounter Start Date: 11/20/17


Encounter Start Time: 10:15


Subjective: Patient with HA, vomiting, abdominal pain overnight. BP spiked 

again this 


-: AM. Had another fall overnight without injury. Happening regularly at home


-: as well. ? need SNF?





- Objective


MAR Reviewed: Yes


Vital Signs & Weight: 


 Vital Signs (12 hours)











  Temp Pulse Resp BP BP Pulse Ox


 


 11/20/17 08:31   108 H   237/80 H  


 


 11/20/17 08:00  98.6 F   20   237/80 H  95


 


 11/20/17 04:00  97.6 F  89  24 H   146/79 H  97


 


 11/20/17 01:20   89    167/91 H 


 


 11/20/17 00:30  97.8 F  82  20   189/93 H  94 L








 Weight











Weight                         246 lb 11.2 oz














I&O: 


 











 11/19/17 11/20/17 11/21/17





 06:59 06:59 06:59


 


Intake Total 1005 1015 


 


Output Total 950  


 


Balance 55 1015 











Result Diagrams: 


 11/16/17 21:22





 11/19/17 04:41





Phys Exam





- Physical Examination


Constitutional: NAD


HEENT: moist MMs


Respiratory: no wheezing, no rales, no rhonchi


Cardiovascular: RRR, no significant murmur


Gastrointestinal: soft, positive bowel sounds


TTP periumbilical, no guarding or masses, normal bowel sounds


Musculoskeletal: no edema


Neurological: non-focal, moves all 4 limbs


Psychiatric: normal affect, A&O x 3





Dx/Plan


(1) Hypertensive urgency, malignant


Code(s): I16.0 - HYPERTENSIVE URGENCY   Status: Chronic   Comment: BP still 

spiking to 230s this AM. Continue titrating BP meds up. Appreciate cardiology 

imput.   





(2) Falls


Code(s): W19.XXXA - UNSPECIFIED FALL, INITIAL ENCOUNTER   Status: Acute   


Qualifiers: 


   Encounter type: initial encounter   Qualified Code(s): W19.XXXA - 

Unspecified fall, initial encounter   





(3) TIA (transient ischemic attack)


Status: Acute   





(4) Dyslipidemia


Code(s): E78.5 - HYPERLIPIDEMIA, UNSPECIFIED   Status: Chronic   





(5) Vomiting


Code(s): R11.10 - VOMITING, UNSPECIFIED   Status: Acute   


Plan: 


Single time, associated with constipation








(6) Constipation


Code(s): K59.00 - CONSTIPATION, UNSPECIFIED   Status: Acute   


Plan: 


Start stool softeners








- Plan


cont current plan of care, PT/OT


increase carvedilol to 25 BID


-: Patient may benefit from SNF with her recurrent falls





* .








- Discharge Day


Encounter end time: 10:45

## 2017-11-21 RX ADMIN — Medication SCH ML: at 08:25

## 2017-11-21 RX ADMIN — Medication SCH ML: at 20:54

## 2017-11-21 RX ADMIN — HYDROCODONE BITARTRATE AND ACETAMINOPHEN PRN TAB: 5; 325 TABLET ORAL at 08:25

## 2017-11-21 RX ADMIN — CYANOCOBALAMIN TAB 1000 MCG SCH MCG: 1000 TAB at 08:23

## 2017-11-21 RX ADMIN — HYDROCODONE BITARTRATE AND ACETAMINOPHEN PRN TAB: 5; 325 TABLET ORAL at 01:31

## 2017-11-21 NOTE — PDOC.PN
- Subjective


Encounter Start Date: 11/21/17


Encounter Start Time: 08:30


Subjective: HA improved this AM, just a dull pressure now. No more N/V. Abd pain


-: better. Still no stool.





- Objective


MAR Reviewed: Yes


Vital Signs & Weight: 


 Vital Signs (12 hours)











  Temp Pulse Resp BP BP Pulse Ox


 


 11/21/17 05:25   92    176/88 H 


 


 11/21/17 03:06  97.9 F  80  18   168/66 H  97


 


 11/21/17 01:30   80    184/74 H 


 


 11/20/17 23:39      198/76 H 


 


 11/20/17 23:19  97.7 F  94  16   212/72 H  96


 


 11/20/17 21:30   86   189/99 H  


 


 11/20/17 20:40  97.8 F  88  16    97


 


 11/20/17 20:00  97.8 F  88  16   204/92 H  97








 Weight











Weight                         247 lb 6.4 oz














I&O: 


 











 11/20/17 11/21/17 11/22/17





 06:59 06:59 06:59


 


Intake Total 1015 870 


 


Output Total  700 


 


Balance 1015 170 











Result Diagrams: 


 11/16/17 21:22





 11/19/17 04:41





Phys Exam





- Physical Examination


Constitutional: NAD


HEENT: moist MMs


Respiratory: no wheezing, no rales, no rhonchi, clear to auscultation bilateral


Cardiovascular: RRR, no significant murmur


Gastrointestinal: soft, positive bowel sounds


Neurological: non-focal, moves all 4 limbs


Psychiatric: normal affect, A&O x 3





Dx/Plan


(1) Hypertensive urgency, malignant


Code(s): I16.0 - HYPERTENSIVE URGENCY   Status: Chronic   Comment: BP not 

spiking this AM. Continue titrating BP meds up. Appreciate cardiology imput. 

Urinary metanephrines and CT for VERN is pending. Patient iodine allergic so 

receiving pretreatment protocol.   





(2) Falls


Code(s): W19.XXXA - UNSPECIFIED FALL, INITIAL ENCOUNTER   Status: Acute   


Qualifiers: 


   Encounter type: initial encounter   Qualified Code(s): W19.XXXA - 

Unspecified fall, initial encounter   





(3) TIA (transient ischemic attack)


Status: Resolved   Comment: Likely hypertensive emergency causing the neuro 

symptoms   





(4) Dyslipidemia


Code(s): E78.5 - HYPERLIPIDEMIA, UNSPECIFIED   Status: Chronic   





(5) Vomiting


Code(s): R11.10 - VOMITING, UNSPECIFIED   Status: Acute   





(6) Constipation


Code(s): K59.00 - CONSTIPATION, UNSPECIFIED   Status: Acute   





- Plan


cont current plan of care, PT/OT, DVT proph w/lovenox, DVT proph w/SCDs


BP improved, d/c home once cardiology happy with medication regimen and 


-: CT and urine metanephrines completed.





* .








- Discharge Day


Encounter end time: 09:50

## 2017-11-21 NOTE — CT
CT ANGIOGRAM ABDOMEN WITH AND WITHOUT IV CONTRAST AND 3D RECONSTRUCTIONS:

 

11/21/2017

 

HISTORY:

Malignant hypertension.

 

COMPARISON:

Noncontrast CT abdomen on 02/23/2017.

 

FINDINGS:

Atherosclerotic vascular calcifications are seen in the abdominal aorta and involving the iliac arter
ies; however, the abdominal aorta is normal in caliber without evidence of an aortic dissection.  The
 abdominal aorta is mildly tortuous.

 

There are single renal arteries bilaterally.  There is mild narrowing at the origin of the left renal
 artery, primarily related to atherosclerotic vascular calcifications.  There are also vascular calci
fications involving the proximal aspect of the right renal artery, which limits evaluation of the lum
en.  However, there is at least mild and possibly moderate narrowing involving the origin of the prox
imal right renal artery.

 

There is mild narrowing at the origin of the celiac artery.  The superior mesenteric artery is patent
.  The origin of the HOA is obscured by prominent vascular calcifications.

 

There is an 11 mm, exophytic, hypodense lesion demonstrating fluid attenuation in the superior pole, 
right kidney, most consistent with a cyst.  A subcentimeter, too-small-to-characterize, hypodense les
ion is seen in the mid portion left kidney.  There are minimal scattered areas of scarring involving 
each kidney.

 

Post cholecystectomy changes are noted.  The common duct is more dilated than on the prior noncontras
marcelle study on February 23,2017, with an area of dilatation in the region of the pancreatic head, measu
ring at least 2.1 cm.  While there are post cholecystectomy changes, this is more dilated than expect
ed.

 

There is thickening of each adrenal gland, likely related to adrenal hyperplasia.  There is suggestio
n of a subcentimeter nodular density involving the lateral limb of the left adrenal gland, but this d
emonstrates an attenuation coefficient that would be more consistent with an adrenal adenoma.

 

There is a 1.6 cm hypodense, cystic appearing lesion in the mid portion of the body of the pancreas.

 

The liver and spleen demonstrate a normal CT appearance for the arterial phase of imaging.

 

Multilevel prominent degenerative changes are seen in the lumbar spine.

 

There is colonic diverticulosis.

 

IMPRESSION:

1.  Mild narrowing at the origin of the left renal artery, with difficulty in visualization of the or
igin of the right renal artery due to atherosclerotic vascular calcifications, but there is at least 
mild and possibly moderate narrowing at the origin of the single right renal artery.

 

2.  Atherosclerotic vascular calcifications in the abdominal aorta and at the origin of the mesenteri
c vessels.

 

3.  Post cholecystectomy changes, but the extrahepatic common duct is more dilated than expected, stephanie
suring 2 cm, and this is more dilated than on the prior exam on 02/23/2017.  The exact etiology is un
certain.  A gastroenterology consultation may be helpful for further evaluation.

 

4.  Hypodense pancreatic cystic lesion, measuring 1.6 cm.  This is difficult to characterize.  Given 
the small size, a six month to one year follow-up evaluation is recommended for further evaluation.

 

5.  The remainder of the findings are as described above.

 

POS: MARGARITA

## 2017-11-21 NOTE — PRG
DATE OF SERVICE:  11/21/2017

 

SUBJECTIVE:  Ms. Leach' blood pressure appears to be somewhat better today.  Her CT scan suggested m
ild narrowing of the left renal artery with poor visualization of the right renal artery.  There was 
mild to moderate narrowing likely.

 

OBJECTIVE:

VITAL SIGNS:  Current blood pressure 160/71, pulse 76 and temperature 98.1.

LUNGS:  Clear.

HEART:  Regular rate and rhythm.

ABDOMEN:  Soft, nontender and nondistended.

EXTREMITIES:  No edema.

 

IMPRESSION:  Malignant hypertension.

 

RECOMMENDATIONS:  I have changed hydrochlorothiazide to chlorthalidone.  We will also increase hydral
azine to 50 mg p.o. t.i.d.  We will await urine collection for metanephrines, which should not postpo
ne her discharge if her blood pressure is better controlled.  We would also consider adding minoxidil
 if needed.

## 2017-11-22 LAB
ANION GAP SERPL CALC-SCNC: 13 MMOL/L (ref 10–20)
BUN SERPL-MCNC: 38 MG/DL (ref 9.8–20.1)
CALCIUM SERPL-MCNC: 8.5 MG/DL (ref 7.8–10.44)
CHLORIDE SERPL-SCNC: 96 MMOL/L (ref 98–107)
CO2 SERPL-SCNC: 26 MMOL/L (ref 23–31)
CREAT CL PREDICTED SERPL C-G-VRATE: 47 ML/MIN (ref 70–130)
HCT VFR BLD CALC: 33.7 % (ref 36–47)
RBC # BLD AUTO: 3.35 MILL/UL (ref 4.2–5.4)
WBC # BLD AUTO: 8.1 THOU/UL (ref 4.8–10.8)

## 2017-11-22 RX ADMIN — Medication SCH ML: at 08:38

## 2017-11-22 RX ADMIN — Medication SCH ML: at 21:22

## 2017-11-22 RX ADMIN — CYANOCOBALAMIN TAB 1000 MCG SCH MCG: 1000 TAB at 08:33

## 2017-11-22 NOTE — PDOC.PN
- Subjective


Encounter Start Date: 11/22/17


Encounter Start Time: 08:50





states she is doing alright. only complaint is a persistent headache. denies 

sob or chest pain 





- Objective


Vital Signs & Weight: 


 Vital Signs (12 hours)











  Temp Pulse Resp BP BP Pulse Ox


 


 11/22/17 08:33   78    


 


 11/22/17 07:46  98.7 F  78  20   161/70 H  92 L


 


 11/22/17 06:23      152/58 H 


 


 11/22/17 05:06     186/78 H  


 


 11/22/17 04:37  98.4 F  83  16   186/78 H  97


 


 11/22/17 00:50  100.1 F H  85  18   150/67 H  98








 Weight











Weight                         247 lb














I&O: 


 











 11/21/17 11/22/17 11/23/17





 06:59 06:59 06:59


 


Intake Total 870 800 


 


Output Total 700 500 


 


Balance 170 300 











Result Diagrams: 


 11/22/17 04:00





 11/22/17 04:00





Phys Exam





- Physical Examination


HEENT: PERRLA, moist MMs, sclera anicteric


Neck: no nodes, no JVD, supple


Respiratory: no wheezing, no rales, no rhonchi


Cardiovascular: RRR


Gastrointestinal: soft, non-tender, no distention, positive bowel sounds


Musculoskeletal: pulses present


Neurological: non-focal, normal sensation, moves all 4 limbs


Psychiatric: normal affect, A&O x 3





Dx/Plan


(1) Acute kidney injury


Code(s): N17.9 - ACUTE KIDNEY FAILURE, UNSPECIFIED   Status: Acute   





(2) Falls


Code(s): W19.XXXA - UNSPECIFIED FALL, INITIAL ENCOUNTER   Status: Acute   


Qualifiers: 


   Encounter type: initial encounter   Qualified Code(s): W19.XXXA - 

Unspecified fall, initial encounter   





(3) Vomiting


Code(s): R11.10 - VOMITING, UNSPECIFIED   Status: Acute   





(4) Hypertensive urgency, malignant


Code(s): I16.0 - HYPERTENSIVE URGENCY   Status: Chronic   Comment: BP not 

spiking this AM. Continue titrating BP meds up. Appreciate cardiology imput. 

Urinary metanephrines and CT for VERN is pending. Patient iodine allergic so 

receiving pretreatment protocol.   





- Plan


cont current plan of care





* .


being that her creatnine jumped from .9 to 1.6 in 24hrs, will hold a couple of 

her BP meds that can affect renal function. I think its best that we recheck 

her creatnine in the a.m. to make sure the creatnine doesnt continue to rise 

especially with the agents that she is on. if better, she can go home tmrw 

morning

## 2017-11-22 NOTE — PRG
DATE OF SERVICE:  11/22/2017

 

SUBJECTIVE:  Ms. Leach is doing well.  Her blood pressure seems to be improving.  Her blood pressure
 was 152 this morning in addition 161 systolic.  She is scheduled for SNU transfer.

 

OBJECTIVE:

VITAL SIGNS:  Blood pressure 161/70, pulse 78, temperature 98.7.

LUNGS:  Clear to auscultation.

CARDIAC:  Regular rate and rhythm.

ABDOMEN:  Soft, nontender, nondistended.

EXTREMITIES:  No edema.

 

IMPRESSION:  Malignant hypertension.

 

RECOMMENDATIONS:  I have increased her hydralazine from 50 mg t.i.d. to 75 mg t.i.d.  She will also g
et her first dose of chlorthalidone today.  From my standpoint, as long as her blood pressure is stab
le, it would be okay for discharge to SNU with a close outpatient followup within the next 1-2 weeks.

## 2017-11-23 LAB
ANION GAP SERPL CALC-SCNC: 14 MMOL/L (ref 10–20)
BUN SERPL-MCNC: 46 MG/DL (ref 9.8–20.1)
CALCIUM SERPL-MCNC: 8.3 MG/DL (ref 7.8–10.44)
CHLORIDE SERPL-SCNC: 93 MMOL/L (ref 98–107)
CO2 SERPL-SCNC: 23 MMOL/L (ref 23–31)
CREAT CL PREDICTED SERPL C-G-VRATE: 36 ML/MIN (ref 70–130)

## 2017-11-23 RX ADMIN — CYANOCOBALAMIN TAB 1000 MCG SCH MCG: 1000 TAB at 08:35

## 2017-11-23 RX ADMIN — Medication SCH ML: at 21:53

## 2017-11-23 RX ADMIN — Medication SCH ML: at 08:36

## 2017-11-23 NOTE — PDOC.PN
- Subjective


Encounter Start Date: 11/23/17


Encounter Start Time: 10:15





Patient seen at bedside. No overnight events. BP is still elevated, no HA 

reported.





- Objective


MAR Reviewed: Yes


Vital Signs & Weight: 


 Vital Signs (12 hours)











  Temp Pulse Resp BP Pulse Ox


 


 11/23/17 08:35   80   


 


 11/23/17 08:34   80   


 


 11/23/17 08:00  98.3 F  80  20  179/63 H  97


 


 11/23/17 04:17  98.8 F  86  16  127/80  97


 


 11/23/17 00:31  97.6 F  78  18  138/62  97








 Weight











Weight                         246 lb 11.2 oz














I&O: 


 











 11/22/17 11/23/17 11/24/17





 06:59 06:59 06:59


 


Intake Total 800 130 


 


Output Total 500  


 


Balance 300 130 











Result Diagrams: 


 11/22/17 04:00





 11/23/17 04:18


Additional Labs: 


 Accuchecks











  11/22/17





  10:39


 


POC Glucose  176 H














Phys Exam





- Physical Examination


Constitutional: NAD


HEENT: moist MMs


Neck: no JVD


Respiratory: clear to auscultation bilateral


Cardiovascular: RRR


Gastrointestinal: soft


Musculoskeletal: pulses present


Neurological: non-focal


Psychiatric: A&O x 3





Dx/Plan


(1) Acute kidney injury


Code(s): N17.9 - ACUTE KIDNEY FAILURE, UNSPECIFIED   Status: Acute   





(2) Constipation


Code(s): K59.00 - CONSTIPATION, UNSPECIFIED   Status: Acute   





(3) Falls


Code(s): W19.XXXA - UNSPECIFIED FALL, INITIAL ENCOUNTER   Status: Acute   


Qualifiers: 


   Encounter type: subsequent encounter   Qualified Code(s): W19.XXXD - 

Unspecified fall, subsequent encounter   





(4) Dyslipidemia


Code(s): E78.5 - HYPERLIPIDEMIA, UNSPECIFIED   Status: Chronic   





(5) Hypertensive urgency, malignant


Code(s): I16.0 - HYPERTENSIVE URGENCY   Status: Chronic   





(6) TIA (transient ischemic attack)


Status: Resolved   Comment: Likely hypertensive emergency causing the neuro 

symptoms   





- Plan


cont current plan of care, plan discussed w/ family, PT/OT, 





* Change amlodipine to Procardia.


* Increase Hydralazine


* Hold ARB and spironolactone


* Consult Nephrology for CAPRI


* Once creatinine improves, D/C to swing bed

## 2017-11-24 LAB
ANION GAP SERPL CALC-SCNC: 11 MMOL/L (ref 10–20)
BACTERIA UR QL AUTO: (no result) HPF
BUN SERPL-MCNC: 50 MG/DL (ref 9.8–20.1)
BUN/CREAT SERPL: 22.83
CALCIUM SERPL-MCNC: 8.4 MG/DL (ref 7.8–10.44)
CHLORIDE SERPL-SCNC: 90 MMOL/L (ref 98–107)
CO2 SERPL-SCNC: 27 MMOL/L (ref 23–31)
CREAT CL PREDICTED SERPL C-G-VRATE: 35 ML/MIN (ref 70–130)
HYALINE CASTS #/AREA URNS LPF: (no result) LPF
PROT UR STRIP.AUTO-MCNC: 100 MG/DL
RBC UR QL AUTO: (no result) HPF (ref 0–3)
WBC UR QL AUTO: (no result) HPF (ref 0–3)

## 2017-11-24 RX ADMIN — HYDROCODONE BITARTRATE AND ACETAMINOPHEN PRN TAB: 5; 325 TABLET ORAL at 19:55

## 2017-11-24 RX ADMIN — Medication SCH ML: at 19:57

## 2017-11-24 RX ADMIN — Medication SCH ML: at 08:08

## 2017-11-24 RX ADMIN — NIFEDIPINE SCH MG: 60 TABLET, EXTENDED RELEASE ORAL at 08:08

## 2017-11-24 RX ADMIN — CYANOCOBALAMIN TAB 1000 MCG SCH MCG: 1000 TAB at 08:08

## 2017-11-24 NOTE — PDOC.PN
- Subjective


Encounter Start Date: 11/24/17


Encounter Start Time: 09:56





Patient seen at bedside. No overnight events. Had a BM after enema 

administration, no new complaints.





- Objective


MAR Reviewed: Yes


Vital Signs & Weight: 


 Vital Signs (12 hours)











  Temp Pulse Resp BP BP Pulse Ox


 


 11/24/17 09:16   77    129/62 


 


 11/24/17 08:08   85    


 


 11/24/17 08:07   85    


 


 11/24/17 07:50  97.7 F  85  16   228/91 H  95


 


 11/24/17 06:34   94    200/70 H 


 


 11/24/17 06:28   94   200/70 H  


 


 11/24/17 04:57     212/78 H  


 


 11/24/17 04:25  97.5 F L  81  20   218/80 H  98


 


 11/24/17 00:44  97.5 F L  95  20   176/71 H  95








 Weight











Weight                         250 lb 14.4 oz














I&O: 


 











 11/23/17 11/24/17 11/25/17





 06:59 06:59 06:59


 


Intake Total 130 620 


 


Balance 130 620 











Result Diagrams: 


 11/22/17 04:00





 11/24/17 04:22





Phys Exam





- Physical Examination


Constitutional: NAD


HEENT: moist MMs


Neck: no JVD


Respiratory: clear to auscultation bilateral


Cardiovascular: RRR


Gastrointestinal: soft


Musculoskeletal: pulses present


Neurological: moves all 4 limbs


Psychiatric: A&O x 3





Dx/Plan


(1) Acute kidney injury


Code(s): N17.9 - ACUTE KIDNEY FAILURE, UNSPECIFIED   Status: Acute   





(2) Constipation


Code(s): K59.00 - CONSTIPATION, UNSPECIFIED   Status: Acute   





(3) Falls


Code(s): W19.XXXA - UNSPECIFIED FALL, INITIAL ENCOUNTER   Status: Acute   


Qualifiers: 


   Encounter type: subsequent encounter   Qualified Code(s): W19.XXXD - 

Unspecified fall, subsequent encounter   





(4) Dyslipidemia


Code(s): E78.5 - HYPERLIPIDEMIA, UNSPECIFIED   Status: Chronic   





(5) Hypertensive urgency, malignant


Code(s): I16.0 - HYPERTENSIVE URGENCY   Status: Chronic   





(6) TIA (transient ischemic attack)


Status: Resolved   Comment: Likely hypertensive emergency causing the neuro 

symptoms   





(7) Hyponatremia


Code(s): E87.1 - HYPO-OSMOLALITY AND HYPONATREMIA   Status: Acute   





- Plan


cont current plan of care, PT/OT, 





* BP remains labile with worsening Renal Function. Will add Imdur for hopeful 

better control


* D/C Spironolactone due to CAPRI


* 250 cc NS bolus


* Check osmolality


* Daily Labs


*

## 2017-11-24 NOTE — CT
HEAD CT WITHOUT CONTRAST:

 

Date:  11/24/17 

 

COMPARISON:  

11/16/17. 

 

HISTORY:  

Dizziness. 

 

TECHNIQUE:  

Serial axial CT imaging at 5 mm intervals from vertex through skull base without contrast. 

 

FINDINGS:

The imaged paranasal sinuses/mastoid air cells are well aerated. 

 

There is no displaced calvarial fracture. There is atherosclerotic calcification of the cavernous car
otid arteries. No intracranial hemorrhage, midline shift, mass effect, or ventricular enlargement. 

 

IMPRESSION: 

No acute findings.  

 

 

 

POS: MARGARITA

## 2017-11-24 NOTE — CON
DATE OF CONSULTATION:  11/24/2017

 

RENAL MEDICINE

 

HISTORY OF PRESENT ILLNESS:  Ms. Leach is an 83-year-old black female who was initially admitted for
 TIA/CVA.  We are being consulted for her acute kidney injury.  During this hospitalization, creatini
ne noted to have gone up.  Her medications were reviewed by the hospitalist, and _____ has now been d
iscontinued.  I ordered some urinalysis and urine chemistries.  Urinalysis and urine chemistry sugges
tive of hemodynamically mediated renal dysfunction.  This morning, she is feeling a little better.  S
he voices no new complaints.

 

REVIEW OF SYSTEMS:  No chest pain, no shortness of breath, no nausea, no vomiting, no diarrhea.  Appe
tite is fair.  Energy level is somewhat decreased.  No headache, no diplopia, no syncopal episode, no
 productive cough, no fever or chills.  No gross hematuria, no hematochezia, no melena, no hematemesi
s.  No joint pains.  No new skin rash.

 

MEDICATIONS:  Zyloprim 300 mg daily, Ecotrin 325 mg daily, Lipitor 10 mg tablet at bedtime, carvedilo
l 25 mg p.o. b.i.d., clonidine 0.1 mg q.4 p.r.n., Coreg 25 mg b.i.d., Lovenox 40 mg subcu every day, 
folic acid 1 mg once a day, Imdur 60 mg daily, minoxidil 2.5 mg once a day, and primidone 250 mg p.o.
 t.i.d.

 

PAST MEDICAL HISTORY:

1.  Status post acute kidney injury.

2.  Status post CVA.

3.  Longstanding hypertension.

4.  History of gout.

5.  Asthma.

6.  Chronic bronchitis.

7.  Fibromyalgia.

8.  Benign essential tremor.

9.  Chronic renal failure.

10.  History of diastolic heart failure.

 

PAST SURGICAL HISTORY:

1.  Status post cholecystectomy.

2.  Status post bilateral knee replacement.

3.  Status post right shoulder surgery.

4.  Status post hysterectomy.

 

SOCIAL HISTORY:  The patient lives in [place] area.  Four children.  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
spital aide.  Education, high school.  No IV drug use.  No history of smoking, no alcohol intake, no 
blood transfusion.

 

ALLERGIES:  None.

 

TRAUMA:  None.

 

IMMUNIZATIONS:  Up to date.

 

HOSPITALIZATIONS:  Please see past medical history.

 

FAMILY HISTORY:  No family history of ESRD.

 

PHYSICAL EXAMINATION:

VITAL SIGNS:  Blood pressure is 129/62, heart rate 77, respiratory rate 16, temperature 97.7, pulse o
x 95%.

GENERAL:  Noted to be awake, alert, supine, comfortable, not in overt distress.

SKIN:  Decreased turgor.

HEENT:  She has pinkish conjunctivae, anicteric sclerae.

NECK:  No neck mass, no carotid bruits, no JVD.

CHEST:  No deformities.

LUNGS:  Clear breath sounds.  No wheezing, no crackles.

HEART:  Normal sinus rhythm.  No murmur, no gallops or rubs.

ABDOMEN:  Globular, soft, nontender, no masses.

EXTREMITIES:  No edema, no deformities.

NEUROLOGIC:  Moving all extremities.  No tremors, no asterixis.  Oriented to 3 spheres.

 

LABORATORY DATA:  Laboratories of 11/22/2017; white count 8.1, hemoglobin 10.6.  On 11/24/2007, sodiu
m 124, potassium 4, chloride 90, carbon dioxide 27, BUN 50, creatinine 2.19, GFR 26 mL per minute, gl
ucose 122, phosphorus 3.9, calcium 8.4, albumin 3.1.  Urinalysis; specific gravity 1.022, WBC 21-50, 
RBC 4-6, protein is 100.  Urine sodium is less than 20.

 

ASSESSMENT AND PLAN:  Acute kidney injury - most likely a hemodynamically mediated renal dysfunction.
  Her losartan and diuretics have been discontinued.  I agree with current management.  I would sugge
st at least start this patient on normal saline at 100 mL per hour.  Renal ultrasound if indicated.

 

There is no indication for any dialytic intervention.  Continue current management.  Recheck base met
 and CBC in a.m.

## 2017-11-24 NOTE — ULT
RENAL SONOGRAM:

11/24/17

 

HISTORY: 

Renal failure.

 

COMPARISON:  

1/13/14.

 

FINDINGS:  

The right kidney measures 8.6 cm x 4.9 cm with the left kidney measuring 10.3 cm x 5.2 cm. There is n
o hydronephrosis  present bilaterally. There is a small exophytic anechoic structures seen at the sup
erior pole right kidney measuring 1.4 cm demonstrating characteristics most suggestive of a small cys
t. There is no renal mass seen involving the left kidney and there is no renal calculus or perinephri
c fluid collection bilaterally.

 

The  urinary bladder demonstrates a normal sonographic appearance with a urinary bladder volume of 45
8.3 mL. 

 

IMPRESSION:  

1.      Asymmetrically small right kidney compared to the left kidney. 

2.      Small right renal cyst. 

3.      No evidence of hydronephrosis.

4.      Normal appearing urinary bladder. 

 

POS: [facility]

## 2017-11-25 LAB
ANION GAP SERPL CALC-SCNC: 13 MMOL/L (ref 10–20)
BASOPHILS # BLD AUTO: 0 THOU/UL (ref 0–0.2)
BASOPHILS NFR BLD AUTO: 0.6 % (ref 0–1)
BUN SERPL-MCNC: 52 MG/DL (ref 9.8–20.1)
CALCIUM SERPL-MCNC: 8.3 MG/DL (ref 7.8–10.44)
CHLORIDE SERPL-SCNC: 88 MMOL/L (ref 98–107)
CO2 SERPL-SCNC: 24 MMOL/L (ref 23–31)
CREAT CL PREDICTED SERPL C-G-VRATE: 32 ML/MIN (ref 70–130)
EOSINOPHIL # BLD AUTO: 0.2 THOU/UL (ref 0–0.7)
EOSINOPHIL NFR BLD AUTO: 2.3 % (ref 0–10)
HCT VFR BLD CALC: 34.8 % (ref 36–47)
LYMPHOCYTES # BLD: 1.8 THOU/UL (ref 1.2–3.4)
LYMPHOCYTES NFR BLD AUTO: 26.9 % (ref 21–51)
MONOCYTES # BLD AUTO: 0.8 THOU/UL (ref 0.11–0.59)
MONOCYTES NFR BLD AUTO: 11.7 % (ref 0–10)
NEUTROPHILS # BLD AUTO: 3.9 THOU/UL (ref 1.4–6.5)
RBC # BLD AUTO: 3.46 MILL/UL (ref 4.2–5.4)
WBC # BLD AUTO: 6.6 THOU/UL (ref 4.8–10.8)

## 2017-11-25 RX ADMIN — HYDROCODONE BITARTRATE AND ACETAMINOPHEN PRN TAB: 5; 325 TABLET ORAL at 16:52

## 2017-11-25 RX ADMIN — Medication SCH ML: at 22:10

## 2017-11-25 RX ADMIN — CYANOCOBALAMIN TAB 1000 MCG SCH MCG: 1000 TAB at 08:44

## 2017-11-25 RX ADMIN — NIFEDIPINE SCH MG: 60 TABLET, EXTENDED RELEASE ORAL at 08:43

## 2017-11-25 RX ADMIN — ALBUTEROL SULFATE PRN MG: 1.25 SOLUTION RESPIRATORY (INHALATION) at 13:06

## 2017-11-25 RX ADMIN — Medication SCH ML: at 08:43

## 2017-11-25 NOTE — PRG
DATE OF SERVICE:  11/25/2017

 

RENAL MEDICINE

 

SUBJECTIVE:  Ms. Leach is 83-year-old black female who was seen for acute kidney injury.  She was st
opped on her ARB/ACE inhibitor.  She was started on IV volume repletion.  Creatinine is noted to be a
n improved this morning.  My plan is to start her on normal saline at 100 mL per hour indefinitely.  
Her urine sediment and urinalysis suggestive of a prerenal azotemia.  No complaints of chest pain or 
shortness of breath.

 

PHYSICAL EXAMINATION:

VITAL SIGNS:  Blood pressure is 205/60, heart rate 80, respiratory rate 18, temperature 97.3, and pul
se ox 98%.

GENERAL:  Noted to be awake, alert, comfortable, not in distress.

SKIN:  Adequate turgor.

HEENT:  Pinkish conjunctivae, anicteric sclerae.

NECK:  No neck mass, no carotid bruits, no JVD.

CHEST:  No deformities.

LUNGS:  Clear breath sounds.  No wheezing, no crackles.

HEART:  Normal sinus rhythm.  No murmur, no gallops, no rubs.

ABDOMEN:  Globular, soft, nontender.

EXTREMITIES:  No edema, no deformities.  Decreased motor lower extremities.

 

MEDICATIONS:  Medications of 11/25/2017 was reviewed.

 

LABORATORY DATA:  Laboratories of 11/25/2017; sodium 121, potassium 4.3, chloride 88, carbon dioxide 
24, BUN 52, creatinine 2.39, GFR 23 mL per minute, glucose 118, calcium 8.3, albumin 3.1.  White coun
t 6.6 and hemoglobin 11.

 

ASSESSMENT AND PLAN:

1.  Acute kidney injury - consider hemodynamically mediated renal dysfunction.  Off ACE inhibitors an
d ARB, hold diuretics, restart normal saline 100 mL per hour.

2.  Hypertension.  Patient currently on nifedipine.  Also patient has been started on minoxidil.  Adj
ust blood pressure medications as needed.  Blood pressure this morning is before pressure medications
.

2.  Status post transient ischemic attack/cardiovascular accident, supportive care.

## 2017-11-25 NOTE — PDOC.PN
- Subjective


Encounter Start Date: 11/25/17


Encounter Start Time: 08:30


Subjective: feels weak, moves all extremities


-: is slurring her speech a bit this morning, confirmed with  at bedsid


-: no trouble swallowing, had her breakfast this am





- Objective


MAR Reviewed: Yes


Vital Signs & Weight: 


 Vital Signs (12 hours)











  Temp Pulse Resp BP BP Pulse Ox


 


 11/25/17 08:44   88   205/60 H  


 


 11/25/17 08:43   88    


 


 11/25/17 08:00  97.3 F L  88  18   205/60 H  98


 


 11/25/17 04:00  97.6 F  83  18   160/50 H  95


 


 11/24/17 23:32  97.3 F L  76  18   172/60 H  98








 Weight











Weight                         253 lb 14.4 oz














I&O: 


 











 11/24/17 11/25/17 11/26/17





 06:59 06:59 06:59


 


Intake Total 620 700 500


 


Output Total   30


 


Balance 620 700 470











Result Diagrams: 


 11/25/17 04:03





 11/25/17 04:03





Phys Exam





- Physical Examination


HEENT: PERRLA, sclera anicteric


Neck: no JVD, supple


Respiratory: no wheezing, no rales


Cardiovascular: RRR, no significant murmur


Gastrointestinal: soft, non-tender, positive bowel sounds


Musculoskeletal: pulses present, edema present


Neurological: non-focal, moves all 4 limbs


lethargic but oriented well





Dx/Plan


(1) Hyponatremia


Code(s): E87.1 - HYPO-OSMOLALITY AND HYPONATREMIA   Status: Acute   





(2) Acute kidney injury


Code(s): N17.9 - ACUTE KIDNEY FAILURE, UNSPECIFIED   Status: Acute   





(3) Constipation


Code(s): K59.00 - CONSTIPATION, UNSPECIFIED   Status: Resolved   





(4) Falls


Code(s): W19.XXXA - UNSPECIFIED FALL, INITIAL ENCOUNTER   Status: Chronic   


Qualifiers: 


   Encounter type: subsequent encounter   Qualified Code(s): W19.XXXD - 

Unspecified fall, subsequent encounter   





(5) Dyslipidemia


Code(s): E78.5 - HYPERLIPIDEMIA, UNSPECIFIED   Status: Chronic   





(6) HTN (hypertension)


Code(s): I10 - ESSENTIAL (PRIMARY) HYPERTENSION   Status: Chronic   


Qualifiers: 


   Hypertension type: essential hypertension   Qualified Code(s): I10 - 

Essential (primary) hypertension   





- Plan


start iv fluids, lethargic due to electrolyte abnormalities


-: has not amb much after hospitalization


-: sodium is 121, creatinine is 2.3


-: check serial bmp, rehab eval/swing bed is pending


-: needs electrolytes to be corrected prior to discharge





* .


Has had 2 CT and MRI brain which have not revealed any ac cva.


All her clinical symptoms likely related to electrolytes and thong





Review of Systems





- Medications/Allergies


Allergies/Adverse Reactions: 


 Allergies











Allergy/AdvReac Type Severity Reaction Status Date / Time


 


iodine Allergy Severe Hives Verified 01/13/14 00:20


 


zoster vaccine live Allergy Severe Anaphylaxis Verified 01/13/14 00:20





[From ZOSTAVAX (PF)]     











Medications: 


 Current Medications





Acetaminophen (Tylenol)  650 mg PO Q4H PRN


   PRN Reason: Headache/Fever or Pain


   Last Admin: 11/22/17 08:32 Dose:  650 mg


Hydrocodone Bitart/Acetaminophen (Norco 5/325)  1 tab PO Q4H PRN


   PRN Reason: Moderate Pain (4-6)


   Last Admin: 11/24/17 19:55 Dose:  1 tab


Al Hydroxide/Mg Hydroxide (Maalox)  30 ml PO Q6H PRN


   PRN Reason: Heartburn  or Indigestion


Albuterol Sulfate (Albuterol Sulfate)  1.25 mg NEB Q8H PRN


   PRN Reason:  SOB


   Last Admin: 11/19/17 07:15 Dose:  1.25 mg


Allopurinol (Zyloprim)  300 mg PO DAILY [facility]


   Last Admin: 11/25/17 08:45 Dose:  300 mg


Aspirin (Ecotrin)  325 mg PO DAILY [facility]


   Last Admin: 11/25/17 08:43 Dose:  325 mg


Atorvastatin Calcium (Lipitor)  10 mg PO HS [facility]


   Last Admin: 11/24/17 19:56 Dose:  10 mg


Carvedilol (Coreg)  25 mg PO BID-WM [facility]


   Last Admin: 11/25/17 08:44 Dose:  25 mg


Clonidine (Catapres)  0.1 mg PO Q4H PRN


   PRN Reason: Systolic BP > 180


   Last Admin: 11/25/17 08:44 Dose:  0.1 mg


Cyanocobalamin (Vitamin B-12)  1,000 mcg PO DAILY [facility]


   Last Admin: 11/25/17 08:44 Dose:  1,000 mcg


Docusate Sodium (Colace)  100 mg PO BID [facility]


   Last Admin: 11/25/17 08:44 Dose:  100 mg


Duloxetine HCl (Cymbalta)  60 mg PO DAILY [facility]


   Last Admin: 11/25/17 08:44 Dose:  60 mg


Enoxaparin Sodium (Lovenox)  40 mg SC 0900 [facility]


   Last Admin: 11/25/17 08:45 Dose:  40 mg


Famotidine (Pepcid)  20 mg PO BID [facility]


   Last Admin: 11/25/17 08:44 Dose:  20 mg


Folic Acid (Folvite)  1 mg PO DAILY [facility]


   Last Admin: 11/25/17 08:45 Dose:  1 mg


Guaifenesin (Robitussin Sf)  200 mg PO Q4H PRN


   PRN Reason: Cough


   Last Admin: 11/17/17 20:47 Dose:  200 mg


Hydralazine HCl (Apresoline)  10 mg SLOW IVP Q4H PRN


   PRN Reason: Systolic BP > 180


   Last Admin: 11/24/17 06:28 Dose:  10 mg


Hydralazine HCl (Apresoline)  100 mg PO TID [facility]


   Last Admin: 11/25/17 08:44 Dose:  100 mg


Sodium Chloride (Normal Saline 0.9%)  1,000 mls @ 100 mls/hr IV .Q10H [facility]


Isosorbide Mononitrate (Imdur)  60 mg PO DAILY [facility]


   Last Admin: 11/25/17 08:44 Dose:  60 mg


Loperamide HCl (Imodium)  2 mg PO PRN PRN


   PRN Reason: Diarrhea/Loose Stools


Loratadine (Claritin)  10 mg PO DAILYPRN PRN


   PRN Reason: Sinus Symptoms


Magnesium Hydroxide (Milk Of Magnesium)  30 ml PO DAILYPRN PRN


   PRN Reason: Constipation


Magnesium Hydroxide (Milk Of Magnesium)  30 ml PO Q8H PRN


   PRN Reason: Constipation


   Last Admin: 11/23/17 13:24 Dose:  30 ml


Minoxidil (Minoxidil)  2.5 mg PO DAILY [facility]


   Last Admin: 11/25/17 08:44 Dose:  2.5 mg


Nifedipine (Procardia Xl)  60 mg PO DAILY [facility]


   Last Admin: 11/25/17 08:43 Dose:  60 mg


Ondansetron HCl (Zofran Odt)  4 mg PO Q6H PRN


   PRN Reason: Nausea/Vomiting


   Last Admin: 11/19/17 08:03 Dose:  4 mg


Ondansetron HCl (Zofran)  4 mg IVP Q6H PRN


   PRN Reason: Nausea/Vomiting


   Last Admin: 11/20/17 01:32 Dose:  4 mg


Polyethylene Glycol (Miralax)  17 gm PO BID PRN


   PRN Reason: Constipation


Primidone (Mysoline)  250 mg PO TID [facility]


   Last Admin: 11/25/17 08:44 Dose:  250 mg


Senna (Senokot)  1 tab PO BID PRN


   PRN Reason: Constipation


Simethicone (Mylicon Chewable)  80 mg PO PCHS PRN


   PRN Reason: Gas Pain


   Last Admin: 11/23/17 21:52 Dose:  80 mg


Sodium Chloride (Ocean Nasal Spray 0.65%)  0 ml EA NARE QIDPRN PRN


   PRN Reason: Nasal Congestion


Sodium Chloride (Flush - Normal Saline)  10 ml IVF Q12HR [facility]


   Last Admin: 11/25/17 08:43 Dose:  10 ml


Sodium Chloride (Flush - Normal Saline)  10 ml IVF PRN PRN


   PRN Reason: Saline Flush


   Last Admin: 11/24/17 06:33 Dose:  10 ml


Zolpidem Tartrate (Ambien)  5 mg PO HSPRN PRN


   PRN Reason: Insomnia

## 2017-11-25 NOTE — PDOC.CTH
Cardiology Progress Note





- Subjective





She is doing better. BP still labile. 





- Objective


 Vital Signs











  Temp Pulse Resp BP BP Pulse Ox


 


 11/25/17 16:53   77   174/69 H  


 


 11/25/17 15:48  97.4 F L  79  18   129/60  96


 


 11/25/17 13:06   70  16    97


 


 11/25/17 11:45  97.4 F L  75  18   131/61  96


 


 11/25/17 08:44  97.3 F L  88  18  205/60 H   98


 


 11/25/17 08:43   88    


 


 11/25/17 08:00  97.3 F L  88  18   205/60 H  98








 











Weight                         253 lb 14.4 oz














 











 11/24/17 11/25/17 11/26/17





 06:59 06:59 06:59


 


Intake Total 620 700 500


 


Output Total   40


 


Balance 620 700 460














- Physical Examination


General/Neuro: alert & oriented x3, NAD


Neck: no JVD present


Lungs: CTA, unlabored respirations


Heart: RRR


Abdomen: NT/ND


Extremities: other: (no edema)





- Telemetry


Telemetry Rhythm: NSR





- Labs


Result Diagrams: 


 11/25/17 04:03





 11/25/17 04:03


 Troponin/CKMB











CK-MB (CK-2)  1.1 ng/mL (0-6.6)   11/16/17  21:22    


 


Troponin I  0.016 ng/mL (< 0.028)   11/16/17  21:22    














- Assessment/Plan





1. Malignant HTN


2. CKD





PLAN:


- Much better controlled


- PRN clonidine for SBP above 170


- Placement.

## 2017-11-26 LAB
ANION GAP SERPL CALC-SCNC: 12 MMOL/L (ref 10–20)
ANION GAP SERPL CALC-SCNC: 12 MMOL/L (ref 10–20)
ANION GAP SERPL CALC-SCNC: 13 MMOL/L (ref 10–20)
ANION GAP SERPL CALC-SCNC: 15 MMOL/L (ref 10–20)
BUN SERPL-MCNC: 60 MG/DL (ref 9.8–20.1)
BUN SERPL-MCNC: 61 MG/DL (ref 9.8–20.1)
BUN SERPL-MCNC: 61 MG/DL (ref 9.8–20.1)
BUN SERPL-MCNC: 62 MG/DL (ref 9.8–20.1)
CALCIUM SERPL-MCNC: 7.7 MG/DL (ref 7.8–10.44)
CALCIUM SERPL-MCNC: 7.7 MG/DL (ref 7.8–10.44)
CALCIUM SERPL-MCNC: 8 MG/DL (ref 7.8–10.44)
CALCIUM SERPL-MCNC: 8 MG/DL (ref 7.8–10.44)
CHLORIDE SERPL-SCNC: 89 MMOL/L (ref 98–107)
CHLORIDE SERPL-SCNC: 89 MMOL/L (ref 98–107)
CHLORIDE SERPL-SCNC: 90 MMOL/L (ref 98–107)
CHLORIDE SERPL-SCNC: 91 MMOL/L (ref 98–107)
CO2 SERPL-SCNC: 21 MMOL/L (ref 23–31)
CO2 SERPL-SCNC: 22 MMOL/L (ref 23–31)
CO2 SERPL-SCNC: 23 MMOL/L (ref 23–31)
CO2 SERPL-SCNC: 23 MMOL/L (ref 23–31)
CREAT CL PREDICTED SERPL C-G-VRATE: 25 ML/MIN (ref 70–130)
CREAT CL PREDICTED SERPL C-G-VRATE: 27 ML/MIN (ref 70–130)
CREAT CL PREDICTED SERPL C-G-VRATE: 27 ML/MIN (ref 70–130)
CREAT CL PREDICTED SERPL C-G-VRATE: 28 ML/MIN (ref 70–130)
HCT VFR BLD CALC: 31.5 % (ref 36–47)
MODIFIED ALLEN'S TEST: (no result)
RBC # BLD AUTO: 3.16 MILL/UL (ref 4.2–5.4)
SODIUM SERPL-SCNC: 122 MMOL/L (ref 135–148)
VENT: NO
WBC # BLD AUTO: 5.4 THOU/UL (ref 4.8–10.8)

## 2017-11-26 RX ADMIN — Medication SCH ML: at 09:51

## 2017-11-26 RX ADMIN — NIFEDIPINE SCH MG: 60 TABLET, EXTENDED RELEASE ORAL at 09:49

## 2017-11-26 RX ADMIN — CYANOCOBALAMIN TAB 1000 MCG SCH MCG: 1000 TAB at 09:49

## 2017-11-26 RX ADMIN — Medication SCH: at 20:34

## 2017-11-26 NOTE — PRG
DATE OF SERVICE:  11/26/2017

 

SUBJECTIVE:  Ms. Leach is 83-year-old black female being followed up for acute kidney injury.  She h
as received volume repletion in the past and has been on salt poor albumin.  However, her serum sodiu
m was noted to be at 122 this morning.  She has been started on hypertonic saline at 40 mL per hour. 
 She is on 3% sodium chloride.  Repeat base met will be done a few hours' time.  Adjust hypertonic sa
line as needed.  I do recommend free water restriction with her.  In addition, renal function has bee
n holding steady.

 

No new complaints.  No chest pain, shortness of breath.

 

OBJECTIVE:

VITAL SIGNS:  Blood pressure is 143/53, heart rate 93, respiratory rate 18, temperature 97, pulse oxi
metry 97%, temperature 98.1.

GENERAL:  Awake, supine, comfortable, not in distress.

SKIN:  Adequate turgor.

HEENT:  Pinkish conjunctivae, anicteric sclerae.

NECK:  No neck mass, no carotid bruits, no JVD.

CHEST:  No deformities.

LUNGS:  Clear breath sounds.  No wheezing, no crackles.

HEART:  Normal sinus rhythm.  No murmur, no gallops or rubs.

ABDOMEN:  Globular, soft, nontender, no masses.

EXTREMITIES:  No edema, no deformities.

 

MEDICATIONS:  11/26/2017 was reviewed.

 

LABORATORY:  11/26/2017, showed white count of 5.4, hemoglobin 10.  Sodium 122, potassium 4.9, chlori
de 91, carbon dioxide 23, BUN 61, creatinine 2.83, glucose 105, calcium 8.0.

 

ASSESSMENT AND PLAN:

1.  Hyponatremia, free water restriction on 3% sodium chloride running at 40 mL per minute.  Recheck 
basic met 6 hours from initiation.  Continue free water restriction.

2.  Acute kidney injury -- presumptive hemodynamically mediated renal dysfunction, stabilizing renal 
function.  Creatinine today is noted at 2.83.  No indication for any dialytic intervention.  I agree 
with current management.  Case discussed with Dr. Yoon.

## 2017-11-26 NOTE — PDOC.PN
- Subjective


Encounter Start Date: 11/26/17


Encounter Start Time: 07:00


Subjective: is lethargic, responds well to verbal stimuli





- Objective


MAR Reviewed: Yes


Vital Signs & Weight: 


 Vital Signs (12 hours)











  Temp Pulse Resp BP BP Pulse Ox


 


 11/26/17 09:49   91   145/48 H  


 


 11/26/17 07:55  98.1 F  89  20   175/52 H  98


 


 11/26/17 03:45  97.4 F L  82  20   115/51 L  99


 


 11/25/17 23:41  97.5 F L  77  20   130/59 L  98








 Weight











Weight                         259 lb














I&O: 


 











 11/25/17 11/26/17 11/27/17





 06:59 06:59 06:59


 


Intake Total 700 2240 


 


Output Total  50 


 


Balance 700 2190 











Result Diagrams: 


 11/26/17 04:12





 11/26/17 08:21





Phys Exam





- Physical Examination


HEENT: PERRLA, moist MMs


Neck: no JVD, supple


Respiratory: no wheezing, no rales


Cardiovascular: RRR, no significant murmur


Gastrointestinal: soft, non-tender, positive bowel sounds


Musculoskeletal: pulses present, edema present


Neurological: non-focal, moves all 4 limbs





Dx/Plan


(1) Hyponatremia


Code(s): E87.1 - HYPO-OSMOLALITY AND HYPONATREMIA   Status: Acute   





(2) Acute kidney injury


Code(s): N17.9 - ACUTE KIDNEY FAILURE, UNSPECIFIED   Status: Acute   





(3) Constipation


Code(s): K59.00 - CONSTIPATION, UNSPECIFIED   Status: Resolved   





(4) Falls


Code(s): W19.XXXA - UNSPECIFIED FALL, INITIAL ENCOUNTER   Status: Chronic   


Qualifiers: 


   Encounter type: subsequent encounter   Qualified Code(s): W19.XXXD - 

Unspecified fall, subsequent encounter   





(5) Dyslipidemia


Code(s): E78.5 - HYPERLIPIDEMIA, UNSPECIFIED   Status: Chronic   





(6) HTN (hypertension)


Code(s): I10 - ESSENTIAL (PRIMARY) HYPERTENSION   Status: Chronic   


Qualifiers: 


   Hypertension type: essential hypertension   Qualified Code(s): I10 - 

Essential (primary) hypertension   





(7) Obesity


Code(s): E66.9 - OBESITY, UNSPECIFIED   Status: Chronic   


Qualifiers: 


   Obesity classification: adult class 3 (BMI >= 40)   Body mass index: BMI 40.0

-44.9 





(8) Physical deconditioning


Code(s): R53.81 - OTHER MALAISE   Status: Acute   





- Plan


tx pt to imcu with progressive lethargy and low sod


-: to start 3% sodium chloride drip, bmp q6h, d/w 


-: prognosis guarded


-: OOB to chair as tolerated, d/w  at bedside


-: renal function no change, will eventually need placement





* .








Review of Systems





- Medications/Allergies


Allergies/Adverse Reactions: 


 Allergies











Allergy/AdvReac Type Severity Reaction Status Date / Time


 


iodine Allergy Severe Hives Verified 01/13/14 00:20


 


zoster vaccine live Allergy Severe Anaphylaxis Verified 01/13/14 00:20





[From ZOSTAVAX (PF)]     











Medications: 


 Current Medications





Acetaminophen (Tylenol)  650 mg PO Q4H PRN


   PRN Reason: Headache/Fever or Pain


   Last Admin: 11/22/17 08:32 Dose:  650 mg


Al Hydroxide/Mg Hydroxide (Maalox)  30 ml PO Q6H PRN


   PRN Reason: Heartburn  or Indigestion


Albuterol Sulfate (Albuterol Sulfate)  1.25 mg NEB Q8H PRN


   PRN Reason:  SOB


   Last Admin: 11/25/17 13:06 Dose:  1.25 mg


Allopurinol (Zyloprim)  300 mg PO DAILY [facility]


   Last Admin: 11/26/17 09:48 Dose:  300 mg


Aspirin (Ecotrin)  325 mg PO DAILY [facility]


   Last Admin: 11/26/17 09:49 Dose:  325 mg


Atorvastatin Calcium (Lipitor)  10 mg PO HS [facility]


   Last Admin: 11/25/17 22:09 Dose:  10 mg


Carvedilol (Coreg)  25 mg PO BID-WM [facility]


   Last Admin: 11/26/17 09:48 Dose:  25 mg


Clonidine (Catapres)  0.1 mg PO Q4H PRN


   PRN Reason: Systolic BP > 180


   Last Admin: 11/25/17 08:44 Dose:  0.1 mg


Cyanocobalamin (Vitamin B-12)  1,000 mcg PO DAILY [facility]


   Last Admin: 11/26/17 09:49 Dose:  1,000 mcg


Docusate Sodium (Colace)  100 mg PO BID [facility]


   Last Admin: 11/26/17 09:48 Dose:  100 mg


Duloxetine HCl (Cymbalta)  60 mg PO DAILY [facility]


   Last Admin: 11/26/17 09:51 Dose:  60 mg


Enoxaparin Sodium (Lovenox)  40 mg SC 0900 [facility]


   Last Admin: 11/26/17 09:51 Dose:  40 mg


Famotidine (Pepcid)  20 mg PO BID [facility]


   Last Admin: 11/26/17 09:49 Dose:  20 mg


Fluconazole (Diflucan)  100 mg PO 1200 [facility]


Folic Acid (Folvite)  1 mg PO DAILY [facility]


   Last Admin: 11/26/17 09:49 Dose:  1 mg


Guaifenesin (Robitussin Sf)  200 mg PO Q4H PRN


   PRN Reason: Cough


   Last Admin: 11/17/17 20:47 Dose:  200 mg


Hydralazine HCl (Apresoline)  10 mg SLOW IVP Q4H PRN


   PRN Reason: Systolic BP > 180


   Last Admin: 11/24/17 06:28 Dose:  10 mg


Hydralazine HCl (Apresoline)  100 mg PO TID [facility]


   Last Admin: 11/26/17 09:50 Dose:  Not Given


Sodium Chloride (Sodium Chloride 3%)  500 mls @ 40 mls/hr IVPB ROUTINE [facility]


   Last Admin: 11/26/17 08:35 Dose:  500 mls


Isosorbide Mononitrate (Imdur)  60 mg PO DAILY [facility]


   Last Admin: 11/26/17 09:49 Dose:  60 mg


Loperamide HCl (Imodium)  2 mg PO PRN PRN


   PRN Reason: Diarrhea/Loose Stools


Loratadine (Claritin)  10 mg PO DAILYPRN PRN


   PRN Reason: Sinus Symptoms


Magnesium Hydroxide (Milk Of Magnesium)  30 ml PO DAILYPRN PRN


   PRN Reason: Constipation


Magnesium Hydroxide (Milk Of Magnesium)  30 ml PO Q8H PRN


   PRN Reason: Constipation


   Last Admin: 11/23/17 13:24 Dose:  30 ml


Minoxidil (Minoxidil)  2.5 mg PO DAILY [facility]


   Last Admin: 11/26/17 09:50 Dose:  2.5 mg


Nifedipine (Procardia Xl)  60 mg PO DAILY [facility]


   Last Admin: 11/26/17 09:49 Dose:  60 mg


Ondansetron HCl (Zofran Odt)  4 mg PO Q6H PRN


   PRN Reason: Nausea/Vomiting


   Last Admin: 11/19/17 08:03 Dose:  4 mg


Ondansetron HCl (Zofran)  4 mg IVP Q6H PRN


   PRN Reason: Nausea/Vomiting


   Last Admin: 11/20/17 01:32 Dose:  4 mg


Polyethylene Glycol (Miralax)  17 gm PO BID PRN


   PRN Reason: Constipation


Primidone (Mysoline)  250 mg PO TID [facility]


   Last Admin: 11/26/17 09:50 Dose:  250 mg


Senna (Senokot)  1 tab PO BID PRN


   PRN Reason: Constipation


Simethicone (Mylicon Chewable)  80 mg PO PCHS PRN


   PRN Reason: Gas Pain


   Last Admin: 11/23/17 21:52 Dose:  80 mg


Sodium Chloride (Ocean Nasal Spray 0.65%)  0 ml EA NARE QIDPRN PRN


   PRN Reason: Nasal Congestion


Sodium Chloride (Flush - Normal Saline)  10 ml IVF Q12HR [facility]


   Last Admin: 11/26/17 09:51 Dose:  10 ml


Sodium Chloride (Flush - Normal Saline)  10 ml IVF PRN PRN


   PRN Reason: Saline Flush


   Last Admin: 11/24/17 06:33 Dose:  10 ml

## 2017-11-26 NOTE — CON
DATE OF CONSULTATION:  11/26/2017

 

CONSULTING PHYSICIAN:  [facility]ospitalist group.

 

REASON FOR CONSULTATION:  Low sodium and mental status changes.

 

HISTORY OF PRESENT ILLNESS:  This patient was first brought to the hospital on 11/16/2017 with a amezcua
sient ischemic attack.  At that time, she was having slurred speech.  She has been hyponatremic since
 the time of admission, but her sodium has gotten worse.  She has also had trouble with controlling h
er blood pressure.

 

PAST MEDICAL HISTORY:

1.  Urinary tract infection.

2.  Fibromyalgia.

3.  Essential tremor.

4.  Obesity.

5.  Hypertension.

6.  Asthma.

7.  Chronic diastolic cardiac dysfunction.

8.  ISIS.

9.  Chronic kidney disease, stage 3.

10.  History of stroke.

 

PAST SURGICAL HISTORY:

1.  Hysterectomy.

2.  Cholecystectomy.

3.  Bilateral knee replacements.

4.  Right shoulder surgery.

 

SOCIAL HISTORY:  Does not smoke.  Occasionally drinks alcohol.

 

FAMILY MEDICAL HISTORY:  Unremarkable.

 

REVIEW OF SYSTEMS:  Not obtainable secondary to her confusion.

 

INPATIENT MEDICATIONS:  Tylenol, albuterol, allopurinol, Ecotrin, Lipitor, Coreg, Catapres, vitamin B
12, Colace, Cymbalta, Lovenox, Pepcid, folic acid, Robitussin, Imdur, Norco, Imodium, Claritin, minox
tara, Procardia, Mysoline, senna and Ambien.

 

PHYSICAL EXAMINATION:

VITAL SIGNS:  Temperature 98.1, pulse 89, respirations 20, O2 sat 98%, blood pressure 175/52.

GENERAL:  She is awake, but somewhat difficult to understand secondary to her speech.

HEENT:  Unremarkable.

NECK:  Without adenopathy, JVD, or bruits.

LUNGS:  Clear.

CARDIOVASCULAR:  S1, S2 regular.

ABDOMEN:  Soft and nontender.

EXTREMITIES:  No clubbing, cyanosis, or edema.

NEUROLOGIC:  She moves all 4 extremities without difficulty.

 

LABORATORY DATA:  White blood cell count 5.4, hematocrit 31.5, platelet count 237, pH 7.39, pCO2 of 4
2, pO2 of 70 on room air.  Sodium 122, potassium 4.9, chloride 91, CO2 of 23, BUN 61, creatinine 2.8,
 glucose 105.  Thyroid function and cortisol are not available for review at this time.

 

ASSESSMENT:

1.  Hyponatremia -- etiology likely related to syndrome of inappropriate antidiuretic hormone secreti
on versus medications.

2.  Mental status changes may be secondary to hyponatremia, but may be secondary to other medications
 such as Norco and Ambien.

 

PLAN:

1.  Discontinue extraneous medications include, Ambien and Norco.

2.  Check thyroid function, cortisol level.

3.  She is currently receiving a small amount of hypertonic saline as directed by Internal Medicine. 
 Her sodium needs to be monitored closely if using that medication.

## 2017-11-26 NOTE — ULT
BILATERAL LOWER EXTREMITY VENOUS DUPLEX SONOGRAM:

11/26/17

 

HISTORY: 

Bilateral leg pain and edema. 

 

FINDINGS:  

Each common femoral vein and greater saphenous junction were evaluated along with each femoral, deep 
femoral, popliteal and posterior tibial vein. There is good color and spectral doppler flow, compress
ion, and augmentation. 

 

IMPRESSION:  

No sonographic evidence of DVT within either lower extremity. 

 

POS: [facility]

## 2017-11-26 NOTE — EKG
Test Reason : 

Blood Pressure : ***/*** mmHG

Vent. Rate : 073 BPM     Atrial Rate : 073 BPM

   P-R Int : 196 ms          QRS Dur : 090 ms

    QT Int : 430 ms       P-R-T Axes : 058 030 052 degrees

   QTc Int : 473 ms

 

Normal sinus rhythm

Normal ECG

When compared with ECG of 20-NOV-2017 00:17, (Unconfirmed)

No significant change was found

Confirmed by ROSALINDA BONDS ([id number]) on 11/26/2017 8:47:20 AM

 

Referred By:  GERARDO           Confirmed By:ROSALINDA BONDS

## 2017-11-26 NOTE — EKG
Test Reason : STAT

Blood Pressure : ***/*** mmHG

Vent. Rate : 082 BPM     Atrial Rate : 092 BPM

   P-R Int : 194 ms          QRS Dur : 094 ms

    QT Int : 408 ms       P-R-T Axes : 063 025 043 degrees

   QTc Int : 476 ms

 

Sinus rhythm with marked sinus arrhythmia ,frequent PAC's

Otherwise normal ECG

When compared with ECG of 16-NOV-2017 20:52, (Unconfirmed)

ME interval has increased

QT has lengthened

Confirmed by ROSALINDA BONDS ([id number]) on 11/26/2017 8:18:33 AM

 

Referred By: BRYNN ACOSTA           Confirmed By:ROSALINDA BONDS

## 2017-11-26 NOTE — PDOC.CTH
Cardiology Progress Note





- Subjective





More altered today but answers questions when awake. She was transferred to 

[facility] for low Sodium and obtunded state. 





- Objective


 Vital Signs











  Temp Pulse Resp BP BP Pulse Ox


 


 11/26/17 11:24   93  18   143/53 H  97


 


 11/26/17 09:49   91   145/48 H  


 


 11/26/17 07:55  98.1 F  89  20   175/52 H  98


 


 11/26/17 03:45  97.4 F L  82  20   115/51 L  99








 











Weight                         259 lb














 











 11/25/17 11/26/17 11/27/17





 06:59 06:59 06:59


 


Intake Total 700 2240 


 


Output Total  50 


 


Balance 700 2190 














- Physical Examination


General/Neuro: NAD


Neck: no JVD present


Lungs: CTA, unlabored respirations


Heart: RRR


Abdomen: NT/ND


Extremities: + edema B (trace)





- Telemetry


Telemetry Rhythm: NSR





- Labs


Result Diagrams: 


 11/26/17 04:12





 11/26/17 08:21


 Troponin/CKMB











CK-MB (CK-2)  1.1 ng/mL (0-6.6)   11/16/17  21:22    


 


Troponin I  0.016 ng/mL (< 0.028)   11/16/17  21:22    














- Assessment/Plan





1. Malignant HTN, better controlled. 


2. CKD


3. Hyponatremia.





PLAN:


- Much better controlled


- PRN clonidine for SBP above 170


- Sodium correction and PO fluids per Neprhology.

## 2017-11-27 LAB
ANION GAP SERPL CALC-SCNC: 10 MMOL/L (ref 10–20)
ANION GAP SERPL CALC-SCNC: 11 MMOL/L (ref 10–20)
ANION GAP SERPL CALC-SCNC: 13 MMOL/L (ref 10–20)
ANION GAP SERPL CALC-SCNC: 14 MMOL/L (ref 10–20)
BUN SERPL-MCNC: 61 MG/DL (ref 9.8–20.1)
BUN SERPL-MCNC: 65 MG/DL (ref 9.8–20.1)
BUN SERPL-MCNC: 67 MG/DL (ref 9.8–20.1)
BUN SERPL-MCNC: 68 MG/DL (ref 9.8–20.1)
CALCIUM SERPL-MCNC: 6.9 MG/DL (ref 7.8–10.44)
CALCIUM SERPL-MCNC: 7.3 MG/DL (ref 7.8–10.44)
CALCIUM SERPL-MCNC: 7.7 MG/DL (ref 7.8–10.44)
CALCIUM SERPL-MCNC: 7.9 MG/DL (ref 7.8–10.44)
CALCIUM SERPL-MCNC: 8 MG/DL (ref 7.8–10.44)
CALCIUM SERPL-MCNC: 8 MG/DL (ref 7.8–10.44)
CHLORIDE SERPL-SCNC: 102 MMOL/L (ref 98–107)
CHLORIDE SERPL-SCNC: 120 MMOL/L (ref 98–107)
CHLORIDE SERPL-SCNC: 93 MMOL/L (ref 98–107)
CHLORIDE SERPL-SCNC: 94 MMOL/L (ref 98–107)
CHLORIDE SERPL-SCNC: 94 MMOL/L (ref 98–107)
CHLORIDE SERPL-SCNC: 95 MMOL/L (ref 98–107)
CO2 SERPL-SCNC: 17 MMOL/L (ref 23–31)
CO2 SERPL-SCNC: 21 MMOL/L (ref 23–31)
CO2 SERPL-SCNC: 22 MMOL/L (ref 23–31)
CO2 SERPL-SCNC: 24 MMOL/L (ref 23–31)
CREAT CL PREDICTED SERPL C-G-VRATE: 21 ML/MIN (ref 70–130)
CREAT CL PREDICTED SERPL C-G-VRATE: 22 ML/MIN (ref 70–130)
CREAT CL PREDICTED SERPL C-G-VRATE: 22 ML/MIN (ref 70–130)
CREAT CL PREDICTED SERPL C-G-VRATE: 23 ML/MIN (ref 70–130)
CREAT CL PREDICTED SERPL C-G-VRATE: 23 ML/MIN (ref 70–130)
CREAT CL PREDICTED SERPL C-G-VRATE: 26 ML/MIN (ref 70–130)
METANEPH 24H UR-MRATE: 173 UG/24 HR (ref 45–290)
METANEPHS 24H UR-MCNC: 231 UG/L
NORMETANEPHRINE 24H UR-MCNC: 1041 UG/L
NORMETANEPHRINE 24H UR-MRATE: 781 UG/24 HR (ref 82–500)

## 2017-11-27 RX ADMIN — Medication SCH ML: at 22:17

## 2017-11-27 RX ADMIN — NIFEDIPINE SCH MG: 60 TABLET, EXTENDED RELEASE ORAL at 08:07

## 2017-11-27 RX ADMIN — Medication SCH ML: at 08:09

## 2017-11-27 RX ADMIN — CYANOCOBALAMIN TAB 1000 MCG SCH MCG: 1000 TAB at 08:08

## 2017-11-27 NOTE — PRG
DATE OF SERVICE:  11/27/2017

 

SUBJECTIVE:  The patient is very dysarthric, hard to understand when she speaks, does not appear to b
e in any distress.

 

PHYSICAL EXAMINATION:

VITAL SIGNS:  Temperature 97.5, pulse 84%, respirations 18, O2 sat 97, blood pressure 118/34.

HEENT:  Unremarkable except for slight facial droop.

NECK:  No JVD.

CHEST:  Clear.

CARDIAC:  S1, S2 regular.

ABDOMEN:  Soft.

EXTREMITIES:  No edema.

 

LABORATORY DATA:  Sodium 124, potassium 4.6, chloride 95, CO2 22, BUN 65, creatinine 0.5, glucose 114
.

 

ASSESSMENT:  

1.  Hyponatremia which is improved after the hypertonic saline.

2.  Stroke with expressive aphasia and dysarthria.

3.  Hypertension.

 

PLAN:   Ambien and Norco have been stopped.  Her thyroid function and cortisol level appear to be nor
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
nction.

 

Plan continued care per specialists involved.  No further suggestions aside from continuing to monito
r electrolytes.

## 2017-11-27 NOTE — SPC
ULTRASOUND GUIDED LEFT UPPER EXTREMITY PICC LINE PLACEMENT:

 

Date:  11/27/17 

 

HISTORY:  

IV access required. 

 

COMPARISON:  

None. 

 

EXPOSURE:

0.6 minutes. 

4550 mGy*cm^2. 

 

FINDINGS:

Technically successful left upper extremity PICC line placement. Dual lumen 5 [place] Islander catheter has a t
rim length of 48 cm. Both lumen flush and aspirate without difficulty. 

 

TECHNIQUE:  

Consent obtained for a left lower extremity PICC line placement. The left arm was prepped and draped 
in the sterile fashion. 1% lidocaine, buffered with sodium bicarbonate, was used for local anesthesia
. Under ultrasound guidance, micropuncture needle was used to cannulate the basilic vein. A 0.018 kiya
dewire was advanced through the needle to the level of the superior vena cava. Under fluoroscopy, the
 wire was advanced into the superior vena cava to document venous access. Wire was subsequently pulle
d back to the SVC/right atrial junction. Trim length was 48 cm. Both lumen flushed and aspirated with
out difficulty. 

 

IMPRESSION: 

Technically successful left upper extremity PICC line placement with ultrasound guidance. 

 

 

POS: [facility]

## 2017-11-27 NOTE — PQF
CLINICAL DOCUMENTATION IMPROVEMENT CLARIFICATION FORM:  ICD-10 Updated

PLEASE DO AN ADDENDUM TO THE PROGRESS NOTE WITH ANY DOCUMENTATION UPDATES OR 
ADDITIONS AND CARRY THROUGH TO DC SUMMARY.   THANK YOU.



DATE:  11/27/17                                   ATTN: Dr. Yoon



Please exercise your independent, professional judgment in responding to the 
clarification form. 

Clinical indicators are provided on the bottom of this form for your review



Please check appropriate box(s):

[ x ] Encephalopathy:

     Type:       [ x ] Acute       [  ] Subacute       [  ] Chronic

     Etiology:  [  ] Hypertensive         [ x ] Metabolic      [  ] Toxic      
   

                    [  ] Drug induced: _______________      

                    [  ] Unspecified ______________ 

                    [  ] Other (please specify) 

[ ] Other diagnosis  ___________

[  ] Unable to determine



In addition, please specify:

Present on Admission (POA):  [ x ] Yes             [  ] No             [  ] 
Unable to determine



For continuity of documentation, please document condition throughout progress 
notes and discharge summary.  Thank You.



CLINICAL INDICATORS - SIGNS / SYMPTOMS / LABS



CARDIOLOGY PN 11/20: SHE RECENTLY WAS ADMITTED FOR TIA-LIKE SYMPTOMS, 

                                       BUT LIKELY REPRESENTED A HYPERTENSIVE 
CRISIS 

                                       &  ENCEPHALOPATHY.



PULMONARY CONSULT 10/26: MENTAL STATUS CHANGES MAY BE SECONDARY TO 

                                               HYPONATREMIA,  BUT MAY BE 
SECONDARY 

                                               TO OTHER MEDICATIONS SUCH AS 
NORCO & AMBIEN.



CARDIOLOGY PN  11/26: MORE ALTERED TODAY BUT ANSWERS QUESTIONS 

                                       WHEN AWAKE. SHE WAS TRANSFERRED TO [facility] 
FOR

                                       LOW SODIUM  &  OBTUNDED STATE. 



PN 11/27:  MORE AWAKE THAN YESTERDAY

                SODIUM IS BETTER , APPEARS TO BE CLINICALLY BETTER THIS 
AM. 



RISKS:

  H&P: HYPERTENSION, UNCONTROLLED. HX OF CHRONIC DIASTOLIC HEART FAILURE, 

         CKD 3, HX OF CVA, CHRONIC PHYSICAL DECONDITIONING. 

                                         



TREATMENTS:

              MAR: HYDRALAZINE HCL 10 MG SPB >180 

  CPOE 11/26: SODIUM CHLORIDE 3% 500ML IV

  CPOE 11/26: NEUROLOGY CONSULT









Thank you, 

Radha



(This form is maintained as a part of the permanent medical record)

 [address]  All Rights Reserved

Radha Cardona RN, BSN    [email or web address]    Office: [phone]

                                                              

[facility]

## 2017-11-27 NOTE — CON
DATE OF CONSULTATION:  11/27/2017

 

REFERRING PROVIDER:  Dr. Gene Yoon.

 

REASON FOR CONSULTATION:  Altered mental status, slurred speech, lethargicness.

 

HISTORY OF PRESENT ILLNESS:  Ms. Leach is a pleasant 83-year-old -
American female, who has been consulted for evaluation of altered mental status
, slurred speech, and lethargicness.  History is obtained from patient's 
medical chart as well as nurses taking care of the patient.  There are no 
family members present and patient is a very poor historian.  Apparently, 
patient had presented to the [facility] on 11/17/2017.  She was noted 
by her daughter at home to have slurred speech and was becoming more 
incoherent.  She has a history of poor gait and balance and she ambulates with 
a walker.  She had fell 3 times in 2 weeks prior to presentation.  As she was 
noted to be having changes in mentation, daughter had brought the patient to 
the [place] [facility].  Since being admitted to the hospital, she had a 
CT scan of the head done which showed no acute intracranial abnormality.  She 
had a MRI brain done on 11/17/2017, which showed no acute intracranial 
abnormality.  She also had a repeat MRI done on 11/27/2017, which showed 
chronic microvascular ischemic changes, but no acute intracranial abnormality.  
Per nurse, patient has waxing and waning changes in mentation.  She also has 
noted intermittent episodes of slurred speech and increased lethargicness for 
which I am being asked to further evaluate this patient.

 

PAST MEDICAL HISTORY, PAST SURGICAL HISTORY, SOCIAL HISTORY, FAMILY HISTORY, 
CURRENT MEDICATIONS, ALLERGIES:  Reviewed from patient's initial dictated H&P 
note done by Dr. Fidencio Chavez.

 

REVIEW OF SYSTEMS:  Unable to perform.

 

PHYSICAL EXAMINATION:

VITAL SIGNS:  Blood pressure 106/41, pulse of 69, temperature of 98, 
respirations of 17, O2 sats of 92% on room air.

GENERAL:  Well-developed, well-nourished -American female, in no 
apparent distress.

RESPIRATORY:  Clear to auscultation bilaterally.

CARDIOVASCULAR:  Regular rate and rhythm.

NEUROLOGIC:  Mental status:  Patient is awake, alert, and oriented to person 
only.  Patient is somewhat slurred speech, but appears fluent.  Cranial nerves:
  Pupils are 3 mm and reactive.  Visual fields are full to threat.  Extraocular 
muscles are intact.  Face appears symmetric.  Tongue and uvula are midline.  
Motor exam showed normal tone and bulk with a 5/5 strength in both upper and 
lower extremities.  Sensory diminished sensation in both upper and lower 
extremities and distal to proximal gradient.  Deep tendon reflexes 1+ reflex in 
both upper and lower extremities.  Babinski:  Plantar responses flexion 
bilaterally.  Coordination gait and Romberg could not be tested.

 

LABORATORY DATA:  Reviewed, which included CBC, BMP, urinalysis, which is 
significant for hemoglobin 10.4, hematocrit 31.5.  Sodium of 148, which has 
increased from 128 earlier part of the day.  BUN of 61, creatinine of 3.22 with 
the initial BUN of 0.99 on presentation, calcium of 6.9.  Urinalysis done on 11/
24/2017 showed moderate leukocyte esterase, 21-50 wbc's, 3+ bacteria.

 

IMAGING STUDIES:  MRI brain without contrast was done on 11/27/2017 and 11/17/
2017 were reviewed, which showed no acute intracranial abnormality.

 

IMPRESSION:

1.  Toxic metabolic encephalopathy.

2.  Altered mental status, likely secondary to #1.

3.  Hypernatremia.

4.  Urinary tract infection.

5.  Acute on chronic renal failure.

 

Ms. Leach is a pleasant 83-year-old -American female, who presented 
with the changes in mentation, slurred speech, and generalized weakness.  She 
initially presented with hyponatremia, which has now resulted in hypernatremia.
  She is also noted to have UTI and worsening renal function.  On the exam, she 
was noted to have asterixis present in both hands.  This would suggest 
underlying toxic metabolic encephalopathy.  At this time, I will recommend 
continuing current medical management.  No further neurological workup needed 
at this time.

 

Thank you for the consultation.

 

JEFFREY

## 2017-11-27 NOTE — MRI
MRI BRAIN NONCONTRAST:

 

Date: 11-27-17 

 

History: Altered mental status. Lethargy. 

 

Comparison: 11-17-17

 

FINDINGS: 

There is no evidence of acute intracranial hemorrhage or infarct. Diffuse cortical atrophy and chroni
c ischemic small vessel disease are similar in appearance to the prior study. There is no mass effect
 or shift of midline structures. Visualized paranasal sinuses remain well aerated. 

 

IMPRESSION: 

1. Chronic type findings are stable. No acute intracranial abnormalities are demonstrated. 

 

POS: [facility]

## 2017-11-27 NOTE — PRG
DATE OF SERVICE:  11/27/2017

 

SUBJECTIVE:  Ms. Leach over the weekend was transferred to the Intermediate care unit due to mental 
status changes.  She appears to be alert today.  Her blood pressure also appears improved.  Her hydra
lazine has been held.  She is now taking minoxidil and nifedipine in addition to Coreg.  Diuretics ha
ve been held due to low sodium.  Her blood pressure has been very difficult to manage.  Her urine met
anephrines were negative.  Her CT scan of her renal artery stenosis has also been negative for severe
 renal artery stenosis.  Her creatinine unfortunately continues to increase.  Dr. Gonzales has been consul
marcelle.

 

OBJECTIVE:

VITAL SIGNS:  Blood pressure 119/34, pulse 84, temperature 97.5.

LUNGS:  Clear to auscultation.

CARDIAC:  Regular rate and rhythm.

ABDOMEN:  Soft, nontender, nondistended.

EXTREMITIES:  No edema.

 

LABORATORY DATA:  Last echo with Doppler dated 05/18/2017 with LVEF 55%-60% with mild TR and mild MR.


 

IMPRESSION:

1.  Malignant hypertension.

2.  Mental status changes.

3.  Hyponatremia.

 

RECOMMENDATIONS:  I discussed the case with Dr. Jayme Gonzales and Dr. Yoon.  Her blood pressur
e appears to be improved.  I would like to make sure she is not hypoperfusing as part of the cause to
 her renal dysfunction.  Echo with Doppler has been ordered to assess LV function.

## 2017-11-27 NOTE — PDOC.PN
- Subjective


Encounter Start Date: 11/27/17


Encounter Start Time: 08:45


Subjective: more awake than yesterday


-: speech is better to begin with but then trails off later with stretching of


-: -words. Does not wear dentures. No headache or specific weakness





- Objective


MAR Reviewed: Yes


Vital Signs & Weight: 


 Vital Signs (12 hours)











  Temp Pulse Resp BP Pulse Ox


 


 11/27/17 08:00  97.5 F L  84  18   96


 


 11/27/17 07:16  97.5 F L  84  18  119/34 L  97


 


 11/27/17 04:00  97.7 F  84  17  113/28 L  97


 


 11/27/17 00:00  98.0 F  80  16  114/37 L  96








 Weight











Weight                         270 lb














I&O: 


 











 11/26/17 11/27/17 11/28/17





 06:59 06:59 06:59


 


Intake Total 2240 1400 240


 


Output Total 50 1000 


 


Balance 2190 400 240











Result Diagrams: 


 11/26/17 04:12





 11/27/17 09:23





Phys Exam





- Physical Examination


HEENT: PERRLA, moist MMs


Neck: no JVD, supple


Respiratory: no wheezing, no rales


Cardiovascular: RRR, no significant murmur


Gastrointestinal: soft, non-tender, positive bowel sounds


Musculoskeletal: no edema, pulses present


Neurological: non-focal, moves all 4 limbs


has lethargy, some aphasia and dysarthria


is oriented





Dx/Plan


(1) Hyponatremia


Code(s): E87.1 - HYPO-OSMOLALITY AND HYPONATREMIA   Status: Acute   





(2) Acute kidney injury


Code(s): N17.9 - ACUTE KIDNEY FAILURE, UNSPECIFIED   Status: Acute   





(3) Constipation


Code(s): K59.00 - CONSTIPATION, UNSPECIFIED   Status: Resolved   





(4) Falls


Code(s): W19.XXXA - UNSPECIFIED FALL, INITIAL ENCOUNTER   Status: Chronic   


Qualifiers: 


   Encounter type: subsequent encounter   Qualified Code(s): W19.XXXD - 

Unspecified fall, subsequent encounter   





(5) Dyslipidemia


Code(s): E78.5 - HYPERLIPIDEMIA, UNSPECIFIED   Status: Chronic   





(6) HTN (hypertension)


Code(s): I10 - ESSENTIAL (PRIMARY) HYPERTENSION   Status: Chronic   


Qualifiers: 


   Hypertension type: essential hypertension   Qualified Code(s): I10 - 

Essential (primary) hypertension   





(7) Obesity


Code(s): E66.9 - OBESITY, UNSPECIFIED   Status: Chronic   


Qualifiers: 


   Obesity classification: adult class 3 (BMI >= 40)   Body mass index: BMI 40.0

-44.9 





(8) Physical deconditioning


Code(s): R53.81 - OTHER MALAISE   Status: Acute   





- Plan


repeat MRI to r/o cva, repeat echo for EF


-: sodium is better at 124, appears to be clinically better this am


-: PT to aggressively work with her to mobilize more


-: usg venous doppler-ve


-: creatinine is up to 3.4 this am, prognosis guarded.





* .


Has had 2 CT and 1 MRI brain before with no signs of new cva, neurology 

consultation for help with her lethargy, dysarthria and mild aphasia





Review of Systems





- Medications/Allergies


Allergies/Adverse Reactions: 


 Allergies











Allergy/AdvReac Type Severity Reaction Status Date / Time


 


iodine Allergy Severe Hives Verified 01/13/14 00:20


 


zoster vaccine live Allergy Severe Anaphylaxis Verified 01/13/14 00:20





[From ZOSTAVAX ()]     











Medications: 


 Current Medications





Acetaminophen (Tylenol)  650 mg PO Q4H PRN


   PRN Reason: Headache/Fever or Pain


   Last Admin: 11/22/17 08:32 Dose:  650 mg


Al Hydroxide/Mg Hydroxide (Maalox)  30 ml PO Q6H PRN


   PRN Reason: Heartburn  or Indigestion


Albuterol Sulfate (Albuterol Sulfate)  1.25 mg NEB Q8H PRN


   PRN Reason:  SOB


   Last Admin: 11/25/17 13:06 Dose:  1.25 mg


Allopurinol (Zyloprim)  100 mg PO DAILY [facility]


   Last Admin: 11/27/17 08:07 Dose:  100 mg


Aspirin (Ecotrin)  325 mg PO DAILY [facility]


   Last Admin: 11/27/17 08:08 Dose:  325 mg


Atorvastatin Calcium (Lipitor)  10 mg PO HS [facility]


   Last Admin: 11/26/17 20:30 Dose:  10 mg


Carvedilol (Coreg)  25 mg PO BID-WM [facility]


   Last Admin: 11/27/17 08:08 Dose:  25 mg


Clonidine (Catapres)  0.1 mg PO Q4H PRN


   PRN Reason: Systolic BP > 180


   Last Admin: 11/25/17 08:44 Dose:  0.1 mg


Cyanocobalamin (Vitamin B-12)  1,000 mcg PO DAILY [facility]


   Last Admin: 11/27/17 08:08 Dose:  1,000 mcg


Docusate Sodium (Colace)  100 mg PO BID [facility]


   Last Admin: 11/27/17 08:08 Dose:  100 mg


Enoxaparin Sodium (Lovenox)  40 mg SC 0900 [facility]


   Last Admin: 11/27/17 08:09 Dose:  40 mg


Famotidine (Pepcid)  20 mg PO BID [facility]


   Last Admin: 11/27/17 08:08 Dose:  20 mg


Fluconazole (Diflucan)  100 mg PO 1200 [facility]


   Last Admin: 11/26/17 11:43 Dose:  100 mg


Folic Acid (Folvite)  1 mg PO DAILY [facility]


   Last Admin: 11/27/17 08:08 Dose:  1 mg


Guaifenesin (Robitussin Sf)  200 mg PO Q4H PRN


   PRN Reason: Cough


   Last Admin: 11/17/17 20:47 Dose:  200 mg


Hydralazine HCl (Apresoline)  10 mg SLOW IVP Q4H PRN


   PRN Reason: Systolic BP > 180


   Last Admin: 11/24/17 06:28 Dose:  10 mg


Hydralazine HCl (Apresoline)  100 mg PO TID [facility]


   Last Admin: 11/27/17 08:08 Dose:  Not Given


Sodium Chloride (Sodium Chloride 3%)  500 mls @ 40 mls/hr IVPB ROUTINE [facility]


   Last Admin: 11/26/17 22:54 Dose:  500 mls


Isosorbide Mononitrate (Imdur)  60 mg PO DAILY [facility]


   Last Admin: 11/27/17 08:07 Dose:  60 mg


Loperamide HCl (Imodium)  2 mg PO PRN PRN


   PRN Reason: Diarrhea/Loose Stools


Loratadine (Claritin)  10 mg PO DAILYPRN PRN


   PRN Reason: Sinus Symptoms


Magnesium Hydroxide (Milk Of Magnesium)  30 ml PO DAILYPRN PRN


   PRN Reason: Constipation


Magnesium Hydroxide (Milk Of Magnesium)  30 ml PO Q8H PRN


   PRN Reason: Constipation


   Last Admin: 11/23/17 13:24 Dose:  30 ml


Minoxidil (Minoxidil)  2.5 mg PO DAILY [facility]


   Last Admin: 11/27/17 08:08 Dose:  2.5 mg


Nifedipine (Procardia Xl)  60 mg PO DAILY [facility]


   Last Admin: 11/27/17 08:07 Dose:  60 mg


Ondansetron HCl (Zofran Odt)  4 mg PO Q6H PRN


   PRN Reason: Nausea/Vomiting


   Last Admin: 11/19/17 08:03 Dose:  4 mg


Ondansetron HCl (Zofran)  4 mg IVP Q6H PRN


   PRN Reason: Nausea/Vomiting


   Last Admin: 11/27/17 08:07 Dose:  4 mg


Polyethylene Glycol (Miralax)  17 gm PO BID PRN


   PRN Reason: Constipation


Senna (Senokot)  1 tab PO BID PRN


   PRN Reason: Constipation


Simethicone (Mylicon Chewable)  80 mg PO P[facility]S PRN


   PRN Reason: Gas Pain


   Last Admin: 11/23/17 21:52 Dose:  80 mg


Sodium Chloride (Ocean Nasal Spray 0.65%)  0 ml EA NARE QIDPRN PRN


   PRN Reason: Nasal Congestion


Sodium Chloride (Flush - Normal Saline)  10 ml IVF Q12HR [facility]


   Last Admin: 11/27/17 08:09 Dose:  10 ml


Sodium Chloride (Flush - Normal Saline)  10 ml IVF PRN PRN


   PRN Reason: Saline Flush


   Last Admin: 11/24/17 06:33 Dose:  10 ml

## 2017-11-27 NOTE — PRG
DATE OF SERVICE:  11/27/2017

 

SERVICE:  Renal Medicine.

 

SUBJECTIVE:  Ms. Leach is an 83-year-old black female seen by Renal Service for acute kidney injury.
  She also developed significant hyponatremia.  Renal function continues to worsen.  In addition, she
 has been recently started on hypertonic saline - 3% with slow improvement of serum sodium.  She was 
also placed on free water restriction.  The patient has also had labile high blood pressure, but this
 has improved with adjustment with her blood pressure medications.  Initial CAT scan of the abdomen s
howed only a mild to moderate narrowing of the renal blood vessels.  In spite of volume repletion wit
h this patient, renal function continues to worsen.  I did review the urinalysis and there is no evid
ence of acute tubular necrosis.  She does have low urine sodium.  I did discuss the case with the car
diologist.  The plan is to do a cardiac echo to rule out significant decrease EF with her.

 

PHYSICAL EXAMINAITON:

VITAL SIGNS:  Blood pressure is 119/34, heart rate 84, respiratory rate 18, temperature 97.5, pulse o
x 97%.

GENERAL:  Noted to be awake, alert, but somewhat lethargic.

SKIN:  Adequate turgor.

HEENT:  Puffy eyelids, pinkish conjunctivae.  No neck mass.  No JVD.

LUNGS:  Decreased breath sounds.

HEART:  Normal sinus rhythm.  No murmur, no gallops or rubs.

ABDOMEN:  Globular, soft, nontender, no masses.

EXTREMITIES:  Positive for edema.

 

MEDICATIONS:  Of 11/26/2017 was reviewed.

 

LABORATORY DATA:  Of 11/27/2017, sodium 124, potassium 4.6, chloride 95, carbon dioxide 21, BUN 65, c
reatinine 3.49, GFR 15 mL per minute, glucose 114, calcium 7.7, hemoglobin 10.4.

 

ASSESSMENT AND PLAN:

1.  Acute kidney injury - initially this was felt to be hemodynamically mediated renal dysfunction.  
Urinalysis does not suggest acute tubular necrosis.  She does have a low urine sodium.  The plan is t
o get a cardiac echo to determine what her cardiac output is.  I did discuss this with Dr. Stack.
  There is no emergent indication for dialysis this morning.

2.  Labile hypertension, much improved with blood pressure medications.

3.  Status post transient ischemic attack/cerebrovascular accident, supportive care.

 

Please note, venogram of the legs was done, it showed no evidence of deep venous thrombosis.

 

Continue supportive care.

 

ADDENDUM:  Cardiac echo to be done today.

## 2017-11-28 LAB
ANION GAP SERPL CALC-SCNC: 11 MMOL/L (ref 10–20)
ANION GAP SERPL CALC-SCNC: 13 MMOL/L (ref 10–20)
BUN SERPL-MCNC: 68 MG/DL (ref 9.8–20.1)
BUN SERPL-MCNC: 70 MG/DL (ref 9.8–20.1)
CALCIUM SERPL-MCNC: 7.9 MG/DL (ref 7.8–10.44)
CALCIUM SERPL-MCNC: 8.1 MG/DL (ref 7.8–10.44)
CHLORIDE SERPL-SCNC: 96 MMOL/L (ref 98–107)
CHLORIDE SERPL-SCNC: 99 MMOL/L (ref 98–107)
CO2 SERPL-SCNC: 22 MMOL/L (ref 23–31)
CO2 SERPL-SCNC: 24 MMOL/L (ref 23–31)
CREAT CL PREDICTED SERPL C-G-VRATE: 20 ML/MIN (ref 70–130)
CREAT CL PREDICTED SERPL C-G-VRATE: 21 ML/MIN (ref 70–130)

## 2017-11-28 RX ADMIN — HEPARIN SODIUM SCH UNITS: 5000 INJECTION, SOLUTION INTRAVENOUS; SUBCUTANEOUS at 16:13

## 2017-11-28 RX ADMIN — NIFEDIPINE SCH: 60 TABLET, EXTENDED RELEASE ORAL at 09:32

## 2017-11-28 RX ADMIN — Medication PRN ML: at 18:23

## 2017-11-28 RX ADMIN — HEPARIN SODIUM SCH UNITS: 5000 INJECTION, SOLUTION INTRAVENOUS; SUBCUTANEOUS at 21:44

## 2017-11-28 RX ADMIN — CYANOCOBALAMIN TAB 1000 MCG SCH: 1000 TAB at 10:49

## 2017-11-28 RX ADMIN — Medication SCH ML: at 21:45

## 2017-11-28 RX ADMIN — Medication SCH ML: at 10:36

## 2017-11-28 RX ADMIN — HEPARIN SODIUM SCH UNITS: 5000 INJECTION, SOLUTION INTRAVENOUS; SUBCUTANEOUS at 10:33

## 2017-11-28 NOTE — PRG
DATE OF SERVICE:  11/28/2017

 

RENAL MEDICINE

 

SUBJECTIVE:  Ms. Leach is an 83-year-old old black female with known history of chronic renal failur
e.  The renal service was seeing her for acute kidney injury.  In the last few days, her renal functi
on has been worsening.  However in the last few days, there may be some stabilization.  Empiric volum
e repletion has been done with this patient.  In addition, cardiac echo was done which showed normal 
EF.  This morning, the patient is still confused.

 

PHYSICAL EXAMINATION:

VITAL SIGNS:  Blood pressure currently is 134/46, heart rate 78, respiratory rate 20, temperature 97.
9, and pulse ox 98%.

GENERAL:  Patient is awake, but confused.

SKIN:  Adequate turgor.

HEENT:  Puffy eyelids.  Pinkish conjunctivae.  Anicteric sclerae.

NECK:  No neck mass, no carotid bruits, no JVD.

LUNGS:  Decreased breath sounds.

HEART:  Normal sinus rhythm.  No murmur, no gallops, no rubs.

ABDOMEN:  Globular, soft, nontender, no masses.

EXTREMITIES:  Trace edema.

 

MEDICATIONS:  Medications of 11/28/2017 reviewed.

 

LABORATORY DATA:  Laboratories of 11/28/2017; sodium 126, potassium 4.6, chloride 96, carbon dioxide 
24, BUN 60, creatinine 3.93, glucose 116, calcium 7.9.  White count 5.4, hemoglobin 10.4; this is on 
11/26/2017.

 

ASSESSMENT AND PLAN:

1.  Acute kidney injury/chronic renal failure.  Renal function continues to remain unimproved.  Howev
er, there may be some stabilization with renal dysfunction.  She is also diuresing.  No indication fo
r any dialytic intervention.  Again, possibility of acute tubular necrosis remains with this patient.
  Initially, we felt that she was simply volume depleted and she received significant volume repletio
n with colloids and crystalloids.

2.  Mental status change - consider metabolic encephalopathy.  Repeat MRI of the brain shows no acute
 intracranial abnormalities.

3.  Labile hypertension - blood pressure is currently running on the low side today.  We will hold of
f blood pressure medications for the moment temporarily.

4.  History of obstructive sleep apnea - bilevel positive airway pressure to be initiated for this pa
tient.

 

Please note according to the daughter, she has not been using her bilevel positive airway pressure fo
r quite some time at home.  Recheck base met and CBC in a.m.

## 2017-11-28 NOTE — PDOC.PN
- Subjective


Encounter Start Date: 11/28/17


Encounter Start Time: 08:40


Subjective: Patient still lethargic, but arousable. Denies further headache. 

Just feels


-: lightheaded. Continues to slur her speech and hard to understand.





- Objective


MAR Reviewed: Yes


Vital Signs & Weight: 


 Vital Signs (12 hours)











  Temp Pulse Resp BP Pulse Ox


 


 11/28/17 07:43   78  20  134/46 L  98


 


 11/28/17 04:03  97.9 F  75  18  109/35 L  94 L


 


 11/28/17 00:03  97.6 F  57 L  16  111/38 L  95


 


 11/27/17 23:02   69   








 Weight











Weight                         272 lb














I&O: 


 











 11/27/17 11/28/17 11/29/17





 06:59 06:59 06:59


 


Intake Total 1400 1220 


 


Output Total 1000 175 


 


Balance 400 1045 











Result Diagrams: 


 11/26/17 04:12





 11/28/17 05:48





Phys Exam





- Physical Examination


Constitutional: NAD


HEENT: moist MMs


Respiratory: no wheezing, no rales, no rhonchi


Cardiovascular: RRR, no significant murmur


Gastrointestinal: soft, positive bowel sounds


Neurological: non-focal, moves all 4 limbs


Deviation from normal: lethargic, slurred speech, oriented





Dx/Plan


(1) Toxic encephalopathy


Code(s): G92 - TOXIC ENCEPHALOPATHY   Status: Acute   Comment: Likely due to 

previous HTN, hyponatremia, and acute renal failure. No evidence CVA at this 

time. UA has developed bacteria and WBC since admission. Will recheck today and 

culture urine. She may be developing a UTI.   





(2) Acute kidney injury


Code(s): N17.9 - ACUTE KIDNEY FAILURE, UNSPECIFIED   Status: Acute   Comment: 

Creatinine continues to creep up, off nephrotoxic agents, Dr. Gonzales following   





(3) Hyponatremia


Code(s): E87.1 - HYPO-OSMOLALITY AND HYPONATREMIA   Status: Acute   Comment: 

Suspect hypernatremia was spurious lab as recheck later yesterday was back to 

low. Sodium stable.   





(4) Hypertensive urgency, malignant


Code(s): I16.0 - HYPERTENSIVE URGENCY   Status: Resolved   Comment: BP improved

, no spike since 11/25   





(5) Falls


Code(s): W19.XXXA - UNSPECIFIED FALL, INITIAL ENCOUNTER   Status: Chronic   


Qualifiers: 


   Encounter type: subsequent encounter   Qualified Code(s): W19.XXXD - 

Unspecified fall, subsequent encounter   





(6) TIA (transient ischemic attack)


Status: Resolved   Comment: Likely hypertensive emergency causing the neuro 

symptoms, repeat MRI negative for stroke   





(7) Dyslipidemia


Code(s): E78.5 - HYPERLIPIDEMIA, UNSPECIFIED   Status: Chronic   





- Plan


cont current plan of care, PT/OT, DVT proph w/heparin, DVT proph w/SCDs


Will switch Lovenox to Heparin due to worsened renal function.





* .








- Discharge Day


Encounter end time: 09:10

## 2017-11-28 NOTE — PRG
DATE OF SERVICE:  11/28/2017

 

SUBJECTIVE:  Ms. Leach continues to wax and wane from a mental standpoint.  Today, she appears somno
lent.  Her blood pressure has been stable in the 110s.  The hydralazine has been held due to blood pr
essure in the 110s.  She is currently on Coreg in addition to atorvastatin, aspirin and minoxidil.

 

PHYSICAL EXAMINATION:

VITAL SIGNS:  Blood pressure 150/47, pulse 78, temperature afebrile.

LUNGS:  Clear to auscultation.

HEART:  Regular rate and rhythm.

ABDOMEN:  Soft, nontender, nondistended.

EXTREMITIES:  2+ pitting edema.

 

PERTINENT LABORATORY:  Hemoglobin 10.4, platelet count of 237, creatinine 3.93.

 

IMPRESSION:

1.  Malignant hypertension.

2.  Mental status changes.

3.  Acute kidney injury.

 

RECOMMENDATIONS:  Mr. Patel blood pressure overall has improved in the last several days versus ove
r the last week.  Her blood pressure has been very difficult to control.  I am concerned about hypope
rfusion with her blood pressure within normal limits, but she may be used to having a high pressure f
or her renals.  At this point, we will discontinue minoxidil and discontinue hydralazine.  We would l
shasha to see her blood pressure higher in the 140s to 150s range.  She may also need this for renal per
fusion.  Her MRI was felt to be nonspecific.  I discussed the case at length with Dr. Jimmy Vera i
n addition to Dr. Bullard.  Her daughter also states she has a history of obstructive sleep apnea, bu
t has not been using her CPAP machine at home.  We will instruct the nursing staff to use her CPAP litzy ray given her somnolence.  She is currently not on oxygen.

## 2017-11-28 NOTE — PRG
DATE OF SERVICE:  11/28/2017

 

SUBJECTIVE:  The patient is still having problems with dysarthric speech.

 

PHYSICAL EXAMINATION:

VITAL SIGNS:  On exam, temperature 97.9, pulse 78, respirations 20, O2 sat 98%, blood pressure 134/46
.

HEENT:  Remarkable for left facial droop.

NECK:  No JVD.

CHEST:  Clear.

CARDIAC:  S1, S2 regular.

ABDOMEN:  Soft.

EXTREMITIES:  No edema.

 

LABORATORY DATA:  Sodium 126, potassium 4.6, chloride 96, CO2 24, BUN 68, creatinine 3.9, glucose 116
.

 

ASSESSMENT:

1.  Hyponatremia - etiology not clear.

2.  Cerebrovascular accident.

3.  Hypertension.

 

RECOMMENDATIONS:

1.  Withhold all sedative medications.

2.  Continue care per primary care service.

## 2017-11-29 LAB
ANION GAP SERPL CALC-SCNC: 15 MMOL/L (ref 10–20)
BASOPHILS # BLD AUTO: 0 THOU/UL (ref 0–0.2)
BASOPHILS NFR BLD AUTO: 0.4 % (ref 0–1)
BUN SERPL-MCNC: 72 MG/DL (ref 9.8–20.1)
CALCIUM SERPL-MCNC: 8 MG/DL (ref 7.8–10.44)
CHLORIDE SERPL-SCNC: 105 MMOL/L (ref 98–107)
CO2 SERPL-SCNC: 19 MMOL/L (ref 23–31)
CREAT CL PREDICTED SERPL C-G-VRATE: 25 ML/MIN (ref 70–130)
EOSINOPHIL # BLD AUTO: 0 THOU/UL (ref 0–0.7)
EOSINOPHIL NFR BLD AUTO: 0.5 % (ref 0–10)
HCT VFR BLD CALC: 29.4 % (ref 36–47)
HYALINE CASTS #/AREA URNS LPF: (no result) LPF
LYMPHOCYTES # BLD: 1.5 THOU/UL (ref 1.2–3.4)
LYMPHOCYTES NFR BLD AUTO: 17.1 % (ref 21–51)
MODIFIED ALLEN'S TEST: POSITIVE
MONOCYTES # BLD AUTO: 0.9 THOU/UL (ref 0.11–0.59)
MONOCYTES NFR BLD AUTO: 10.4 % (ref 0–10)
NEUTROPHILS # BLD AUTO: 6.2 THOU/UL (ref 1.4–6.5)
PROT UR STRIP.AUTO-MCNC: 30 MG/DL
RBC # BLD AUTO: 2.92 MILL/UL (ref 4.2–5.4)
SODIUM SERPL-SCNC: 136 MMOL/L (ref 135–148)
VENT: NO
WBC # BLD AUTO: 8.6 THOU/UL (ref 4.8–10.8)
WBC UR QL AUTO: (no result) HPF (ref 0–3)

## 2017-11-29 RX ADMIN — FLUCONAZOLE SCH MLS: 2 INJECTION, SOLUTION INTRAVENOUS at 10:23

## 2017-11-29 RX ADMIN — HEPARIN SODIUM SCH UNITS: 5000 INJECTION, SOLUTION INTRAVENOUS; SUBCUTANEOUS at 20:25

## 2017-11-29 RX ADMIN — HEPARIN SODIUM SCH UNITS: 5000 INJECTION, SOLUTION INTRAVENOUS; SUBCUTANEOUS at 08:29

## 2017-11-29 RX ADMIN — CYANOCOBALAMIN TAB 1000 MCG SCH: 1000 TAB at 08:52

## 2017-11-29 RX ADMIN — FAMOTIDINE SCH MG: 10 INJECTION, SOLUTION INTRAVENOUS at 08:29

## 2017-11-29 RX ADMIN — HEPARIN SODIUM SCH UNITS: 5000 INJECTION, SOLUTION INTRAVENOUS; SUBCUTANEOUS at 15:31

## 2017-11-29 RX ADMIN — NIFEDIPINE SCH: 60 TABLET, EXTENDED RELEASE ORAL at 08:53

## 2017-11-29 RX ADMIN — Medication SCH ML: at 10:24

## 2017-11-29 RX ADMIN — Medication SCH ML: at 20:24

## 2017-11-29 NOTE — PRG
DATE OF SERVICE:  11/29/2017

 

SUBJECTIVE:  Ms. Leach continues to be very somnolent for reasons that are not quite clear to me.  H
er daughter is at the bedside and has requested that she be put on CPAP at night.  Apparently, the pa
tient has a history of sleep apnea and has been using a CPAP at home, but not in quite sometime.  Augustus
arently, the family cannot find the machine at home.

 

OBJECTIVE:

VITAL SIGNS:  On exam today, her temperature is 96.5, pulse 83, respirations 18, O2 saturation 98%.

HEENT:  Unremarkable.

NECK:  No JVD.

LUNGS:  Clear.

CARDIAC:  S1 and S2 regular.

ABDOMEN:  Soft.

EXTREMITIES:  No edema.

 

LABORATORY DATA:  White blood cell count 8.6, hematocrit 29.4, platelet count 243.  Sodium 129, potas
sium 4.7, chloride 99, CO2 of 22, BUN 70, creatinine 4.0, glucose 131.

 

ASSESSMENT:

1.  Encephalopathy.

2.  Possible stroke.

3.  Underlying sleep apnea.

 

PLAN:  I will write for CPAP if the patient can use at night, although I do not think this will be mu
ch to improve her mental status

## 2017-11-29 NOTE — PRG
DATE OF SERVICE:  11/29/2017

 

RENAL MEDICINE

 

SUBJECTIVE:  Ms. Leach is an 83-year-old black female who came in for CVA and being followed by the 
renal service for her acute kidney injury.  Initially, the acute kidney injury was felt to be a hemod
ynamically mediated renal dysfunction.  However, in spite of crystalloids and colloids, renal functio
n continues to worsen.  There is a probability of most likely she has an acute tubular necrosis.  Lior
al function continues to slowly worsen, but in the last few days, I feel that this may be plateauing.
  She also became hyponatremic.  At that time, we converted her IV fluids to 3% sodium chloride and s
lan sodium has slowly improved.  Yesterday, it was noted to be 1.9.  Please note her serum sodium we
nt to as low as 118.

 

PHYSICAL EXAMINATION:

VITAL SIGNS:  Blood pressure is 164/45, heart rate 83, respiratory rate 18, temperature 96.5, pulse o
x 98%.

GENERAL:  The patient is sleeping but arousable, comfortable, not in distress.

SKIN:  Adequate turgor.

HEENT:  Pinkish and slightly pale conjunctivae, anicteric sclerae.

NECK:  No neck mass, no carotid bruits, no JVD.

CHEST:  No deformities.

LUNGS:  Decreased breath sounds.  No wheezing, no crackles.

HEART:  Normal sinus rhythm.  No murmur, no gallops, no rubs.

ABDOMEN:  Globular, soft, nontender, no masses.

EXTREMITIES:  No edema, no deformities.

 

MEDICATIONS:  Medications of 11/29/2017 was reviewed.

 

LABORATORY DATA:  Laboratories of 11/29/2017; white count 8.6, hemoglobin 9.6.  Sodium 129 and potass
ium 4.7.  On 11/28/2017, sodium 129, potassium 4.7, chloride 99, carbon dioxide 22, BUN 70, creatinin
e 4.07, calcium is 8.1.

 

ASSESSMENT AND PLAN:

1.  Acute kidney injury - renal function may be plateauing.  Continue to observe.  We are awaiting re
peat base met this morning.  No indication for any emergent hemodialysis with this patient.

2.  Hyponatremia - clinically improved with 3% sodium chloride.  Due to the improved serum sodium, we
 will change the 3% sodium chloride to D5 normal saline at 100 mL per hour.

3. ? of aspiration - patient voiced swallow study today.

4.  Labile hypertension, blood pressure medications have been adjusted downwards.  Please note recent
 cardiac echo showed an ejection fraction that was reported as within normal.

 

Overall, agree with current management.  Overall, prognosis remains guarded.

## 2017-11-29 NOTE — PDOC.PN
- Subjective


Encounter Start Date: 11/29/17


Encounter Start Time: 11:00


-: non-verbal


Subjective: Patient somnolent today, hard to arouse. 





- Objective


MAR Reviewed: Yes


Vital Signs & Weight: 


 Vital Signs (12 hours)











  Temp Pulse Resp BP BP Pulse Ox


 


 11/29/17 10:30   91   189/61 H  


 


 11/29/17 08:53   83    


 


 11/29/17 08:00  96.5 F L  83  18   164/45 H  98


 


 11/29/17 04:03  96.4 F L  87  20   175/52 H  98


 


 11/28/17 23:35  97.0 F L  83  19   152/44 H  96








 Weight











Weight                         263 lb 3.2 oz














I&O: 


 











 11/28/17 11/29/17 11/30/17





 06:59 06:59 06:59


 


Intake Total 1220 1115 


 


Output Total 175 440 


 


Balance 1045 675 











Result Diagrams: 


 11/29/17 03:44





 11/29/17 09:09





Phys Exam





- Physical Examination


Somnolent


HEENT: moist MMs


Respiratory: no wheezing, no rales, no rhonchi


Cardiovascular: RRR


Gastrointestinal: soft, positive bowel sounds


Neurological: non-focal


no obvious facial droop


Deviation from normal: somnolent, minimal responsive, not talking





Dx/Plan


(1) Toxic encephalopathy


Code(s): G92 - TOXIC ENCEPHALOPATHY   Status: Acute   Comment: Likely due to 

previous HTN, hyponatremia, and acute renal failure. No evidence CVA at this 

time. Repeat UA without evidence significant UTI. Currently on Cipro, but if no 

response by tomorrow with d/c.   





(2) Acute kidney injury


Code(s): N17.9 - ACUTE KIDNEY FAILURE, UNSPECIFIED   Status: Acute   Comment: 

Creatinine continues to creep up, off nephrotoxic agents, Dr. Gonzales following   





(3) Hyponatremia


Code(s): E87.1 - HYPO-OSMOLALITY AND HYPONATREMIA   Status: Acute   Comment: 

Suspect hypernatremia was spurious lab as recheck later yesterday was back to 

low. Sodium stable.   





(4) Hypertensive urgency, malignant


Code(s): I16.0 - HYPERTENSIVE URGENCY   Status: Resolved   Comment: BP improved

, no spike since 11/25   





(5) Falls


Code(s): W19.XXXA - UNSPECIFIED FALL, INITIAL ENCOUNTER   Status: Chronic   


Qualifiers: 


   Encounter type: subsequent encounter   Qualified Code(s): W19.XXXD - 

Unspecified fall, subsequent encounter   





(6) TIA (transient ischemic attack)


Status: Resolved   Comment: Likely hypertensive emergency causing the neuro 

symptoms, repeat MRI negative for stroke   





(7) Dyslipidemia


Code(s): E78.5 - HYPERLIPIDEMIA, UNSPECIFIED   Status: Chronic   





- Plan


cont current plan of care


Patient much more somnolent today without obvious cause. Will recheck ABG. 


-: Start CPAP at night. Continue monitoring renal and sodium levels.


-: Repeat CT. Check ammonia.





* .








- Discharge Day


Encounter end time: 11:30

## 2017-11-29 NOTE — CT
CT BRAIN NONCONTRAST:

 

Date:  11/29/17 

 

HISTORY: 

83-year-old female with worsening altered mental status. 

 

FINDINGS:

There is no midline shift or any other mass effect.  There is no evidence of acute intracranial hemor
rhage, large cortical infarct, obstructive hydrocephalus, or extraaxial fluid collection.  The calvar
ium is intact.

 

IMPRESSION: 

No acute intracranial findings.

 

 

jn [] 

 

POS: RENAE

## 2017-11-30 LAB
ANION GAP SERPL CALC-SCNC: 14 MMOL/L (ref 10–20)
BUN SERPL-MCNC: 70 MG/DL (ref 9.8–20.1)
CALCIUM SERPL-MCNC: 7.9 MG/DL (ref 7.8–10.44)
CHLORIDE SERPL-SCNC: 111 MMOL/L (ref 98–107)
CO2 SERPL-SCNC: 18 MMOL/L (ref 23–31)
CREAT CL PREDICTED SERPL C-G-VRATE: 31 ML/MIN (ref 70–130)

## 2017-11-30 RX ADMIN — Medication SCH ML: at 20:16

## 2017-11-30 RX ADMIN — CYANOCOBALAMIN TAB 1000 MCG SCH: 1000 TAB at 08:33

## 2017-11-30 RX ADMIN — NIFEDIPINE SCH: 60 TABLET, EXTENDED RELEASE ORAL at 08:34

## 2017-11-30 RX ADMIN — FLUCONAZOLE SCH MLS: 2 INJECTION, SOLUTION INTRAVENOUS at 08:35

## 2017-11-30 RX ADMIN — HEPARIN SODIUM SCH UNITS: 5000 INJECTION, SOLUTION INTRAVENOUS; SUBCUTANEOUS at 08:36

## 2017-11-30 RX ADMIN — HEPARIN SODIUM SCH UNITS: 5000 INJECTION, SOLUTION INTRAVENOUS; SUBCUTANEOUS at 20:43

## 2017-11-30 RX ADMIN — FAMOTIDINE SCH MG: 10 INJECTION, SOLUTION INTRAVENOUS at 08:36

## 2017-11-30 RX ADMIN — Medication SCH ML: at 08:36

## 2017-11-30 RX ADMIN — HEPARIN SODIUM SCH UNITS: 5000 INJECTION, SOLUTION INTRAVENOUS; SUBCUTANEOUS at 15:29

## 2017-11-30 NOTE — PRG
DATE OF SERVICE:  11/30/2017

 

SUBJECTIVE:  Ms. Leach today is doing better.  Her mentation has improved.  Her blood pressure medic
ine had been adjusted.  Her blood pressure has been running higher than it has in the past.  Her crea
tinine has also improved.

 

OBJECTIVE:

CURRENT VITAL SIGNS:  Blood pressure 215/82, pulse 86, temperature 97.4.

LUNGS:  Clear to auscultation.

CARDIAC:  Regular rate and rhythm.

ABDOMEN:  Soft, nontender, nondistended.

EXTREMITIES:  No edema.

 

PERTINENT LABORATORY DATA:  Hemoglobin 9.6; creatinine 2.57, which is down from 3.25.

 

IMPRESSION:

1.  Malignant hypertension.

2.  Acute on chronic kidney disease.

3.  Metabolic encephalopathy.

 

RECOMMENDATIONS:  I have discussed the case with Dr. Jayme Gonzales.  We both agree she does better as
 far as her renal status and the metabolic encephalopathy which she runs high in her blood pressure. 
 We would like to reach a goal unfortunately of 160/70, where blood pressure was much better controll
ed was once she started to digress.  Given now blood pressure in the 200s, I would like to reinstitut
e Norvasc in addition to her minoxidil.  We will avoid hydralazine.  Continue to monitor closely.

## 2017-11-30 NOTE — PRG
DATE OF SERVICE:  11/30/2017

 

She is much more awake than she was yesterday, she did sleep with CPAP last night.

 

PHYSICAL EXAMINATION:  

VITAL SIGNS:  Temperature is 97.8, pulse 82, respirations 26, O2 sat 98%, blood pressure 172/52.

HEENT:  Unremarkable.

NECK:  No JVD.

CHEST:  Clear.

CARDIAC:  S1 and S2 regular.

ABDOMEN:  Soft.

EXTREMITIES:  No edema.

 

LABORATORY DATA:  Sodium 137, potassium 5.7, chloride 111, CO2 18, BUN 70, creatinine 2.5, glucose 14
2.

 

ASSESSMENT:

1.  Encephalopathy, which is improved.

2.  Hyperkalemia.

3.  Azotemia.

 

PLAN:  

1.  Continue CPAP at night.  

2.  Nephrology input per Dr. Gonzales.  

 

Hopefully can be transferred out to the floor later.

## 2017-11-30 NOTE — PDOC.PN
- Subjective


Encounter Start Date: 11/30/17


Encounter Start Time: 10:30


Subjective: Patient much more alert this AM. Speaking slowly. Alert. Denies 

pain.


-: States she still feels tired.





- Objective


MAR Reviewed: Yes


Vital Signs & Weight: 


 Vital Signs (12 hours)











  Temp Pulse Resp BP Pulse Ox


 


 11/30/17 08:34   82   


 


 11/30/17 07:44  97.8 F  82  26 H  172/52 H  98


 


 11/30/17 04:00  96.2 F L  81  16  137/70  99


 


 11/30/17 02:25   86  18   99


 


 11/30/17 00:00  96.4 F L  82  18  140/40 L  98


 


 11/29/17 23:00   80  17   100








 Weight











Weight                         265 lb 3.2 oz














I&O: 


 











 11/29/17 11/30/17 12/01/17





 06:59 06:59 06:59


 


Intake Total 1115 2000 


 


Output Total 440 375 


 


Balance 675 1625 











Result Diagrams: 


 11/29/17 03:44





 11/30/17 04:12





Phys Exam





- Physical Examination


Constitutional: NAD


HEENT: moist MMs


Respiratory: no wheezing, no rales, no rhonchi


Cardiovascular: RRR


Gastrointestinal: soft, positive bowel sounds


Neurological: non-focal, moves all 4 limbs


Deviation from normal: alert, slow speech, mild confusion though much improved





Dx/Plan


(1) Toxic encephalopathy


Code(s): G92 - TOXIC ENCEPHALOPATHY   Status: Acute   Comment: Likely due to 

previous HTN, hyponatremia, and acute renal failure. No evidence CVA at this 

time. Repeat UA without evidence significant UTI. Currently on Cipro, but if no 

response by tomorrow with d/c.   





(2) Acute kidney injury


Code(s): N17.9 - ACUTE KIDNEY FAILURE, UNSPECIFIED   Status: Acute   Comment: 

Starting to improve, creatinine down below 3   





(3) Hyponatremia


Code(s): E87.1 - HYPO-OSMOLALITY AND HYPONATREMIA   Status: Resolved   





(4) Hypertensive urgency, malignant


Code(s): I16.0 - HYPERTENSIVE URGENCY   Status: Resolved   Comment: BP improved

, no spike since 11/25   





(5) Falls


Code(s): W19.XXXA - UNSPECIFIED FALL, INITIAL ENCOUNTER   Status: Chronic   


Qualifiers: 


   Encounter type: subsequent encounter   Qualified Code(s): W19.XXXD - 

Unspecified fall, subsequent encounter   





(6) TIA (transient ischemic attack)


Status: Resolved   Comment: Likely hypertensive emergency causing the neuro 

symptoms, repeat MRI negative for stroke   





(7) Dyslipidemia


Code(s): E78.5 - HYPERLIPIDEMIA, UNSPECIFIED   Status: Chronic   





(8) Hyperkalemia


Code(s): E87.5 - HYPERKALEMIA   Status: Acute   Comment: mild, limit potassium 

intake and monitor closely   





- Plan


cont current plan of care, continue antibiotics, DVT proph w/heparin, DVT proph 

w/SCDs


high protein renal diet if safe to swallow with speech therapy





* .








- Discharge Day


Encounter end time: 11:00

## 2017-11-30 NOTE — PRG
DATE OF SERVICE:  11/30/2017

 

SERVICE:  Renal Medicine.

 

SUBJECTIVE:  Ms. Leach is an 83-year-old black female who was being followed up by the renal service
 for her acute kidney injury.  Initially, this was felt to be hemodynamically mediated renal dysfunct
ion.  However, the renal function worsened a few days ago.  However, in the last 48 hours, renal func
tion has been slowly improving.  In addition, her hospitalization has been marked by severe hyponatre
vesta.  She received 3% sodium chloride with eventual improvement of the hyponatremia.  This could all 
be related to hypovolemic hyponatremia?.  In addition, her mental status change has been improving.  
This was felt to be from metabolic encephalopathy.  A CT scan yesterday was done, which showed no acu
te intracranial findings.  This morning, the patient is feeling better.  The patient denies any chest
 pain or shortness of breath.

 

OBJECTIVE:

VITAL SIGNS:  Blood pressure is 172/52, heart rate 82, respiratory rate 26, temperature 97.8, pulse o
x 98%.

GENERAL:  Awake, comfortable, not in overt distress, improved mentation.

HEENT:  Slightly pale conjunctivae, anicteric sclerae.

NECK:  No neck mass, no carotid bruits, no JVD.

CHEST:  No deformities.

LUNGS:  Clear breath sounds.

HEART:  Normal sinus rhythm.  No murmur, no gallops or rubs.

ABDOMEN:  Globular, soft, nontender, no masses.

EXTREMITIES:  Trace edema.

 

MEDICATIONS:   Of 11/30/2017 was reviewed.

 

LABORATORY DATA:  Of 11/29/2017, white count 8.6, hemoglobin 9.6.  On 11/30/2017, sodium 137, potassi
um 5.7, chloride 111, carbon dioxide 18, BUN 70, creatinine 2.57, glucose 142, calcium 7.9.

 

ASSESSMENT AND PLAN:

1.  Hyponatremia - unclear etiology, although hypovolemic hyponatremia remains.  Much improved.

2.  Mild hyperkalemia.  If the patient is resumed on a diet, consider low potassium diet.

3.  Acute kidney injury - consider a superimposed acute tubular necrosis.  Slowly improving.  Most re
cent creatinine now is 2.57.  Please note, the patient's creatinine peaked at 4.07.  No indication fo
r any dialytic intervention.

4.  Metabolic encephalopathy, slowly improving.

5.  Labile hypertension, adjust blood pressure medications as needed.  We will recheck another base m
et and CBC in a.m.

## 2017-12-01 LAB
ANION GAP SERPL CALC-SCNC: 12 MMOL/L (ref 10–20)
BASOPHILS # BLD AUTO: 0.1 THOU/UL (ref 0–0.2)
BASOPHILS NFR BLD AUTO: 0.5 % (ref 0–1)
BUN SERPL-MCNC: 59 MG/DL (ref 9.8–20.1)
CALCIUM SERPL-MCNC: 8.2 MG/DL (ref 7.8–10.44)
CHLORIDE SERPL-SCNC: 111 MMOL/L (ref 98–107)
CO2 SERPL-SCNC: 19 MMOL/L (ref 23–31)
CREAT CL PREDICTED SERPL C-G-VRATE: 46 ML/MIN (ref 70–130)
EOSINOPHIL # BLD AUTO: 0.1 THOU/UL (ref 0–0.7)
EOSINOPHIL NFR BLD AUTO: 1.1 % (ref 0–10)
HCT VFR BLD CALC: 28.5 % (ref 36–47)
LYMPHOCYTES # BLD: 2.4 THOU/UL (ref 1.2–3.4)
LYMPHOCYTES NFR BLD AUTO: 20.8 % (ref 21–51)
MONOCYTES # BLD AUTO: 1.5 THOU/UL (ref 0.11–0.59)
MONOCYTES NFR BLD AUTO: 13.4 % (ref 0–10)
NEUTROPHILS # BLD AUTO: 7.3 THOU/UL (ref 1.4–6.5)
RBC # BLD AUTO: 2.84 MILL/UL (ref 4.2–5.4)
WBC # BLD AUTO: 11.3 THOU/UL (ref 4.8–10.8)

## 2017-12-01 RX ADMIN — FLUCONAZOLE SCH MLS: 2 INJECTION, SOLUTION INTRAVENOUS at 08:07

## 2017-12-01 RX ADMIN — HEPARIN SODIUM SCH UNITS: 5000 INJECTION, SOLUTION INTRAVENOUS; SUBCUTANEOUS at 20:31

## 2017-12-01 RX ADMIN — ALBUTEROL SULFATE PRN MG: 1.25 SOLUTION RESPIRATORY (INHALATION) at 18:41

## 2017-12-01 RX ADMIN — Medication SCH ML: at 08:46

## 2017-12-01 RX ADMIN — FAMOTIDINE SCH MG: 10 INJECTION, SOLUTION INTRAVENOUS at 07:59

## 2017-12-01 RX ADMIN — Medication SCH ML: at 20:31

## 2017-12-01 RX ADMIN — HEPARIN SODIUM SCH UNITS: 5000 INJECTION, SOLUTION INTRAVENOUS; SUBCUTANEOUS at 07:59

## 2017-12-01 RX ADMIN — CYANOCOBALAMIN TAB 1000 MCG SCH MCG: 1000 TAB at 07:58

## 2017-12-01 RX ADMIN — HEPARIN SODIUM SCH UNITS: 5000 INJECTION, SOLUTION INTRAVENOUS; SUBCUTANEOUS at 15:39

## 2017-12-01 RX ADMIN — NIFEDIPINE SCH MG: 60 TABLET, EXTENDED RELEASE ORAL at 07:58

## 2017-12-01 NOTE — PRG
DATE OF SERVICE:  12/01/2017

 

SUBJECTIVE:  Ms. Leach is more conversant.

 

PHYSICAL EXAMINATION:

VITAL SIGNS:  On exam, temperature is 98.6, pulse 97, blood pressure 202/51, O2 sat 96%.

HEENT:  Unremarkable.

NECK:  No JVD.

CHEST:  Fairly clear.

CARDIAC:  S1 and S2 regular.

ABDOMEN:  Soft.

EXTREMITIES:  No edema.

 

LABORATORY DATA:  White blood cell count 11.3, hematocrit 28.5, platelet count 233.  Sodium 137, pota
ssium 4.9, chloride 111, CO2 of 19, BUN 59, creatinine 1.7, and glucose 93.

 

ASSESSMENT:

1.  Encephalopathy, which is improved with allowing her blood pressure to run a little higher.

2.  Possible underlying sleep apnea -- apparently has a history of this, but was not compliant at [facility].

3.  Renal insufficiency.

 

PLAN:

1.  Continue CPAP at night.

2.  Can transfer out to the telemetry floor.

## 2017-12-01 NOTE — PDOC.PN
- Subjective


Encounter Start Date: 12/01/17


Encounter Start Time: 11:45





c/o being sore..





- Objective


Vital Signs & Weight: 


 Vital Signs (12 hours)











  Temp Pulse Pulse Resp BP BP BP


 


 12/01/17 11:05  98.3 F  79   24 H    146/43 H


 


 12/01/17 08:00  98.6 F  94   21 H   


 


 12/01/17 07:58   97    202/51 H  


 


 12/01/17 07:36    94    202/51 H 


 


 12/01/17 07:09  98.6 F  94   21 H    168/45 H


 


 12/01/17 04:00  99.0 F  88   20    159/49 H


 


 12/01/17 02:34   90   20   














  Pulse Ox Pulse Ox


 


 12/01/17 11:05  95 


 


 12/01/17 08:00  96 


 


 12/01/17 07:58  


 


 12/01/17 07:36   97


 


 12/01/17 07:09  96 


 


 12/01/17 04:00  99 


 


 12/01/17 02:34  100 








 Weight











Admit Weight                   250 lb


 


Weight                         265 lb 3.2 oz














I&O: 


 











 11/30/17 12/01/17 12/02/17





 06:59 06:59 06:59


 


Intake Total 2000 2020 


 


Output Total 375 975 


 


Balance 1625 1045 











Result Diagrams: 


 12/01/17 04:54





 12/01/17 04:54





Phys Exam





- Physical Examination


HEENT: PERRLA


Neck: no JVD


Respiratory: clear to auscultation bilateral


Cardiovascular: RRR


Gastrointestinal: soft


Musculoskeletal: edema present


Neurological: moves all 4 limbs





Dx/Plan


(1) Acute kidney injury


Code(s): N17.9 - ACUTE KIDNEY FAILURE, UNSPECIFIED   Status: Acute   


Plan: 


f/u with nephrology.


Comment: improving.   





(2) Hyperkalemia


Code(s): E87.5 - HYPERKALEMIA   Status: Acute   Comment: resolved..   





(3) Physical deconditioning


Code(s): R53.81 - OTHER MALAISE   Status: Acute   Comment: to rehab when more 

stable..   





(4) Toxic encephalopathy


Code(s): G92 - TOXIC ENCEPHALOPATHY   Status: Acute   Comment: 

Improving...Likely due to previous HTN, hyponatremia, and acute renal failure. 

No evidence CVA at this time. Repeat UA without evidence significant UTI. 

Currently on Cipro, but if no response by tomorrow with d/c.   





(5) Dyslipidemia


Code(s): E78.5 - HYPERLIPIDEMIA, UNSPECIFIED   Status: Chronic   





(6) HTN (hypertension)


Code(s): I10 - ESSENTIAL (PRIMARY) HYPERTENSION   Status: Chronic   


Qualifiers: 


   Hypertension type: essential hypertension   Qualified Code(s): I10 - 

Essential (primary) hypertension   


Plan: 


continue current therapy..


Comment: bp satisfactory..   





- Plan





* .

## 2017-12-01 NOTE — PRG
DATE OF SERVICE:  12/01/2017

 

SUBJECTIVE:  Ms. Leach is an 83-year-old black female who was seen by the Renal Service for acute ki
dney injury secondary to presumed acute tubular necrosis.  During the last several days her renal fun
ction has been improving.  She continues to diurese.  No other complaints.  In addition, her mentatio
n is improving.

 

OBJECTIVE:

VITAL SIGNS:  Blood pressure is 168/45, heart rate 94, respiratory rate 21, temperature 98.6, pulse o
x 96%.

GENERAL:  Awake, alert, comfortable, not in distress.

SKIN:  Adequate turgor.

HEENT:   Pinkish conjunctivae.  Anicteric sclerae.

NECK:  No neck mass, no carotid bruits, no JVD.

CHEST:  No deformities.

LUNGS:  Clear breath sounds.

HEART:  Normal sinus rhythm.  No murmur, no gallops, no rubs.

ABDOMEN:  Globular, soft, nontender, no masses.

EXTREMITIES:  No edema.

 

MEDICATIONS:  12/01/2017 - Reviewed.

 

LABORATORY:  12/01/2017 - White count 11.3, hemoglobin 9.1.  Sodium 137, potassium 4.9, chloride 111,
 carbon dioxide 19, BUN 59, creatinine 1.77, glucose 93, calcium 8.2.

 

ASSESSMENT AND PLAN:

1.  Acute kidney injury - secondary to possible ischemic acute tubular necrosis, much improved renal 
function.  No indication for dialytic intervention.  Continue current management.  Continue to optimi
ze blood pressure.

2.  Labile hypertension - blood pressure has been running high again.  We have resumed some of her BP
 meds.  We will try to get this patient to normotensive levels.  BP systolic of 170 would be adequate
.  I did discuss this at length with Dr. Stack.

3.  Metabolic encephalopathy, slowly improving.  Continue current management.

 

Overall I agree with current management.

## 2017-12-01 NOTE — PRG
DATE OF SERVICE:  12/01/2017

 

SUBJECTIVE:  Ms. Leach today is doing better.  She continues to improve mentally.  Her creatinine al
so improved.  Her blood pressure has been elevated, but stable.

 

PHYSICAL EXAMINATION:

VITAL SIGNS:  Blood pressure 168/45, which increased to 202/51; pulse 94, temperature 98.6.

LUNGS:  Clear to auscultation.

CARDIAC:  Regular rate and rhythm.

ABDOMEN:  Soft, nontender, nondistended.

EXTREMITIES:  A 1+ pitting edema.

 

PERTINENT LABORATORY DATA:  Hemoglobin 9.1.  Creatinine 1.77, down from 2.57.

 

IMPRESSION:

1.  Malignant hypertension.

2.  Acute on chronic kidney disease.

3.  Metabolic encephalopathy.

 

RECOMMENDATIONS:  Again, I would recommend keeping her blood pressure between 160 and 170.  Her menta
tion and creatinine appeared to improve given the above.  Very slowly over the next several weeks to 
months try and decrease her blood pressure as she adapts.  At this point, we will stop nifedipine, an
d add Norvasc 5 mg q.p.m.  Continue to use p.r.n. hydralazine for elevated blood pressure.

## 2017-12-02 LAB
ANION GAP SERPL CALC-SCNC: 11 MMOL/L (ref 10–20)
BUN SERPL-MCNC: 57 MG/DL (ref 9.8–20.1)
CALCIUM SERPL-MCNC: 9 MG/DL (ref 7.8–10.44)
CHLORIDE SERPL-SCNC: 109 MMOL/L (ref 98–107)
CO2 SERPL-SCNC: 23 MMOL/L (ref 23–31)
CREAT CL PREDICTED SERPL C-G-VRATE: 54 ML/MIN (ref 70–130)

## 2017-12-02 RX ADMIN — Medication SCH ML: at 08:49

## 2017-12-02 RX ADMIN — HEPARIN SODIUM SCH UNITS: 5000 INJECTION, SOLUTION INTRAVENOUS; SUBCUTANEOUS at 21:11

## 2017-12-02 RX ADMIN — HEPARIN SODIUM SCH UNITS: 5000 INJECTION, SOLUTION INTRAVENOUS; SUBCUTANEOUS at 16:00

## 2017-12-02 RX ADMIN — CYANOCOBALAMIN TAB 1000 MCG SCH MCG: 1000 TAB at 08:49

## 2017-12-02 RX ADMIN — Medication SCH ML: at 21:14

## 2017-12-02 RX ADMIN — HEPARIN SODIUM SCH UNITS: 5000 INJECTION, SOLUTION INTRAVENOUS; SUBCUTANEOUS at 08:49

## 2017-12-02 RX ADMIN — FLUCONAZOLE SCH MLS: 2 INJECTION, SOLUTION INTRAVENOUS at 08:49

## 2017-12-02 RX ADMIN — FAMOTIDINE SCH MG: 10 INJECTION, SOLUTION INTRAVENOUS at 08:49

## 2017-12-02 NOTE — PDOC.PN
- Subjective


Encounter Start Date: 12/02/17


Encounter Start Time: 11:25





Expresses no complaint.





- Objective


Vital Signs & Weight: 


 Vital Signs (12 hours)











  Temp Pulse Resp BP BP Pulse Ox


 


 12/02/17 08:50   97   163/53 H  


 


 12/02/17 08:33   97  19    97


 


 12/02/17 08:00  98.6 F  94  18    95


 


 12/02/17 07:33  98.6 F  94  18   171/53 H  97


 


 12/02/17 04:00  98.2 F  88  21 H   158/53 H  99


 


 12/02/17 01:53   97  23 H    98


 


 12/02/17 01:00   95  18   156/55 H 


 


 12/02/17 00:26   86   185/63 H  








 Weight











Admit Weight                   250 lb


 


Weight                         268 lb 7 oz














I&O: 


 











 12/01/17 12/02/17 12/03/17





 06:59 06:59 06:59


 


Intake Total 2020 1260 


 


Output Total 975 783 [id number]


 


Balance [phone] [phone]











Result Diagrams: 


 12/01/17 04:54





 12/02/17 04:50





Phys Exam





- Physical Examination


HEENT: sclera anicteric


Neck: no JVD


Respiratory: clear to auscultation bilateral


Cardiovascular: RRR


Gastrointestinal: soft


Musculoskeletal: edema present


Neurological: moves all 4 limbs





Dx/Plan


(1) Acute kidney injury


Code(s): N17.9 - ACUTE KIDNEY FAILURE, UNSPECIFIED   Status: Acute   Comment: 

improving.   





(2) Hyperkalemia


Code(s): E87.5 - HYPERKALEMIA   Status: Acute   Comment: resolved..   





(3) Physical deconditioning


Code(s): R53.81 - OTHER MALAISE   Status: Acute   Comment: to rehab when more 

stable..   





(4) Toxic encephalopathy


Code(s): G92 - TOXIC ENCEPHALOPATHY   Status: Acute   Comment: 

Improving...Likely due to previous HTN, hyponatremia, and acute renal failure. 

No evidence CVA at this time. Repeat UA without evidence significant UTI. 

Currently on Cipro, but if no response by tomorrow with d/c.   





(5) Dyslipidemia


Code(s): E78.5 - HYPERLIPIDEMIA, UNSPECIFIED   Status: Chronic   





(6) HTN (hypertension)


Code(s): I10 - ESSENTIAL (PRIMARY) HYPERTENSION   Status: Chronic   


Qualifiers: 


   Hypertension type: essential hypertension   Qualified Code(s): I10 - 

Essential (primary) hypertension   


Comment: bp satisfactory..   





(7) Dysphagia


Code(s): R13.10 - DYSPHAGIA, UNSPECIFIED   Status: Acute   


Plan: 


Check modified Barium swallow.


May need PEG.








- Plan





* .

## 2017-12-02 NOTE — PRG
DATE OF SERVICE:  12/02/2017

 

PHYSICAL EXAMINATION:

VITAL SIGNS:  Ms. Leach is afebrile, heart rate is in the 90s, blood pressure 
163/53, respiratory rate 16, oximetry is 96.

LUNGS:  Clear anteriorly.

HEART:  Regular rhythm.

 

LABORATORY DATA:  Sodium 138, potassium 4.5, chloride 109, bicarb 22, BUN 57, 
creatinine 1.49 down from a high of 4.07.

 

IMPRESSION:

1.  Improved encephalopathy.

2.  Probable underlying sleep apnea.

3.  Chronic kidney disease, improved.

 

PLAN:  Continue current therapy.

 

MTDD

## 2017-12-02 NOTE — PRG
DATE OF SERVICE:  12/02/2017

 

RENAL MEDICINE

 

SUBJECTIVE:  Ms. Leach is an 83-year-old black female who was seen for an acute kidney injury.  Over
time, renal function has been improving with conservative management.  She is status post volume repl
etion.  She most likely had ATN.  Blood pressure is relatively stable.  BP meds were resumed due to h
igh BP.  No complaints of chest pain or shortness of breath.  She does have some difficulty swallowin
g.  Speech therapy is evaluating her.

 

She voices no new complaints.

 

PHYSICAL EXAMINATION:

VITAL SIGNS:  Blood pressure 163/53, heart rate 97, respiratory rate 19, O2 sat 97%.

GENERAL:  Noted to be awake, alert, comfortable, not in distress.

SKIN:  Adequate turgor.

HEENT:  Pinkish conjunctivae, anicteric sclerae.

NECK:  No neck mass, no carotid bruits, no JVD.

CHEST:  No deformities.

LUNGS:  Clear breath sounds.

HEART:  Normal sinus rhythm.  No murmurs, no gallops, no rubs.

ABDOMEN:  Globular, soft, nontender, no masses.

EXTREMITIES:  Positive for edema.

 

MEDICATIONS:  Medications of 12/02/2017 reviewed.

 

LABORATORY DATA:  Laboratories of 12/02/2017, sodium 138, potassium 4.5, chloride 109, carbon dioxide
 23, BUN 57, creatinine 1.49, glucose 109, and calcium 9.

 

ASSESSMENT AND PLAN:

1.  Acute kidney injury - consider resolving acute tubular necrosis, improving renal function over ti
me.  No indication for any dialytic intervention.  Overall, agree with current management.  Continue 
current blood pressure medications.

2.  Hypertension is slowly improving.  Continue current antihypertensive regimen.

3.  Status post cerebrovascular accident, supportive care.  The patient is being evaluated by Speech 
Therapy for swallowing disorder.

 

For the moment, agree with current management, recheck basic metabolic panel and CBC in a.m.

## 2017-12-03 LAB
ANION GAP SERPL CALC-SCNC: 10 MMOL/L (ref 10–20)
BASOPHILS # BLD AUTO: 0 THOU/UL (ref 0–0.2)
BASOPHILS NFR BLD AUTO: 0.2 % (ref 0–1)
BUN SERPL-MCNC: 51 MG/DL (ref 9.8–20.1)
CALCIUM SERPL-MCNC: 9.2 MG/DL (ref 7.8–10.44)
CHLORIDE SERPL-SCNC: 111 MMOL/L (ref 98–107)
CO2 SERPL-SCNC: 25 MMOL/L (ref 23–31)
CREAT CL PREDICTED SERPL C-G-VRATE: 56 ML/MIN (ref 70–130)
EOSINOPHIL # BLD AUTO: 0.1 THOU/UL (ref 0–0.7)
EOSINOPHIL NFR BLD AUTO: 0.9 % (ref 0–10)
HCT VFR BLD CALC: 25.8 % (ref 36–47)
LYMPHOCYTES # BLD: 3.1 THOU/UL (ref 1.2–3.4)
LYMPHOCYTES NFR BLD AUTO: 27.1 % (ref 21–51)
MONOCYTES # BLD AUTO: 1.3 THOU/UL (ref 0.11–0.59)
MONOCYTES NFR BLD AUTO: 11.1 % (ref 0–10)
NEUTROPHILS # BLD AUTO: 6.9 THOU/UL (ref 1.4–6.5)
RBC # BLD AUTO: 2.55 MILL/UL (ref 4.2–5.4)
WBC # BLD AUTO: 11.3 THOU/UL (ref 4.8–10.8)

## 2017-12-03 RX ADMIN — Medication SCH ML: at 21:54

## 2017-12-03 RX ADMIN — CYANOCOBALAMIN TAB 1000 MCG SCH: 1000 TAB at 16:11

## 2017-12-03 RX ADMIN — HEPARIN SODIUM SCH UNITS: 5000 INJECTION, SOLUTION INTRAVENOUS; SUBCUTANEOUS at 21:54

## 2017-12-03 RX ADMIN — HEPARIN SODIUM SCH UNITS: 5000 INJECTION, SOLUTION INTRAVENOUS; SUBCUTANEOUS at 14:53

## 2017-12-03 RX ADMIN — HEPARIN SODIUM SCH UNITS: 5000 INJECTION, SOLUTION INTRAVENOUS; SUBCUTANEOUS at 08:54

## 2017-12-03 RX ADMIN — Medication SCH ML: at 16:12

## 2017-12-03 RX ADMIN — FAMOTIDINE SCH: 10 INJECTION, SOLUTION INTRAVENOUS at 16:11

## 2017-12-03 RX ADMIN — FLUCONAZOLE SCH MLS: 2 INJECTION, SOLUTION INTRAVENOUS at 15:41

## 2017-12-03 NOTE — RAD
KUB:

 

History: Dobbhoff tube placement. 

 

FINDINGS: 

A Dobbhoff feeding tube is seen in the fundus region of the stomach. 

 

IMPRESSION: 

Dobbhoff feeding tube overlying the fundus of the stomach.

 

POS: MARGARITA

## 2017-12-03 NOTE — PRG
DATE OF SERVICE:  12/03/2017

 

SUBJECTIVE:  Myrtle Leach is in no distress.  She does not have an effective swallow, so recommende
d placement of feeding tube via the nasogastric route.

 

OBJECTIVE:

VITAL SIGNS:  Temperature 100.5, heart rate 111, blood pressure has been up as high as 202/71 today, 
respiratory rates in the 20s.  Oximetry is 95.

LUNGS:  Clear.

HEART:  Regular rhythm.

ABDOMEN:  Soft.

 

LABORATORY DATA AND IMAGING:  Radiograph shows the gastric tube in the stomach.

 

White count 11.3, hemoglobin 8.3, platelets 242.  Sodium 141, potassium 4.7, chloride 111, bicarbonat
e 25, BUN 51, creatinine 1.46.

 

IMPRESSION:

1.  Improving encephalopathy.

2.  Probable underlying sleep apnea.

3.  Chronic kidney disease.

4.  Hypertension, poorly controlled.

 

PLAN:  Continue supportive care.  Blood pressure medication adjustments can be made.  Be fine with th
e blood pressure as high as 160-180 systolic, but probably should be making adjustments for blood pre
ssure greater than 180 systolic.

## 2017-12-03 NOTE — PDOC.PN
- Subjective


Encounter Start Date: 12/03/17


Encounter Start Time: 11:50





Legs pain..





- Objective


Vital Signs & Weight: 


 Vital Signs (12 hours)











  Temp Pulse Resp BP Pulse Ox


 


 12/03/17 07:13  100.1 F H  104 H  24 H  179/60 H  98


 


 12/03/17 04:00  99.4 F  102 H  22 H  181/64 H  99


 


 12/03/17 02:00   105 H  20  166/56 H  98








 Weight











Admit Weight                   250 lb


 


Weight                         268 lb 9 oz














I&O: 


 











 12/02/17 12/03/17 12/04/17





 06:59 06:59 06:59


 


Intake Total 1260 0 


 


Output Total 950 [id number] 


 


Balance 310 -[phone] 











Result Diagrams: 


 12/03/17 04:57





 12/03/17 04:57





Phys Exam





- Physical Examination


HEENT: sclera anicteric


Neck: no JVD


Respiratory: clear to auscultation bilateral


Cardiovascular: RRR


Gastrointestinal: soft


Musculoskeletal: edema present


Neurological: moves all 4 limbs





Dx/Plan


(1) Acute kidney injury


Code(s): N17.9 - ACUTE KIDNEY FAILURE, UNSPECIFIED   Status: Acute   Comment: 

No acute change in renal function from yesterday..   





(2) Hyperkalemia


Code(s): E87.5 - HYPERKALEMIA   Status: Acute   Comment: resolved..   





(3) Physical deconditioning


Code(s): R53.81 - OTHER MALAISE   Status: Acute   Comment: to rehab when more 

stable..   





(4) Toxic encephalopathy


Code(s): G92 - TOXIC ENCEPHALOPATHY   Status: Acute   Comment: 

Improving...Likely due to previous HTN, hyponatremia, and acute renal failure. 

No evidence CVA at this time. Repeat UA without evidence significant UTI. 

Currently on Cipro, but if no response by tomorrow with d/c.   





(5) Dyslipidemia


Code(s): E78.5 - HYPERLIPIDEMIA, UNSPECIFIED   Status: Chronic   





(6) HTN (hypertension)


Code(s): I10 - ESSENTIAL (PRIMARY) HYPERTENSION   Status: Chronic   


Qualifiers: 


   Hypertension type: essential hypertension   Qualified Code(s): I10 - 

Essential (primary) hypertension   


Comment: bp satisfactory..   





(7) Dysphagia


Code(s): R13.10 - DYSPHAGIA, UNSPECIFIED   Status: Acute   


Plan: 


For modified barium swallow.


May need PEG.








- Plan


-: No acute change.


-: For modified barium swallow.


-: ?peg.





* .

## 2017-12-03 NOTE — PRG
DATE OF SERVICE:  12/03/2017

 

RENAL MEDICINE

 

SUBJECTIVE:  Ms. Leach is an 83-year-old black female who was seen by Renal Service for acute kidney
 injury.  Renal function worse in the last several days, but recently has been improving spontaneousl
y.  She has received crystalloid and colloids.  She most likely developed mild ATN.  She is diuresing
 well, no new complaints today.  She is still somewhat confused.

 

PHYSICAL EXAMINATION:

VITAL SIGNS:  Blood pressure is 179/60, heart rate 104, respiratory rate 24, temperature is 100.1, pu
lse ox 98%.

GENERAL:  Noted to be awake, confused, not in overt distress.

SKIN:  Adequate turgor.

HEENT:  Slightly pale conjunctivae, anicteric sclerae.

NECK:  No neck mass, no carotid bruits.  No JVD.

CHEST:  No deformities.

LUNGS:  Clear breath sounds.

HEART:  Normal sinus rhythm.  No murmur, no gallops, no rubs.

ABDOMEN:  Globular, soft, nontender, no masses.

EXTREMITIES:  Positive for edema.

 

MEDICATIONS:  Medications of 12/03/2017 reviewed.

 

LABORATORY DATA:  Laboratories of 12/03/2017, white count 11.3, hemoglobin 8.3, sodium 141, potassium
 4.7, chloride 111, carbon dioxide 25, BUN 51, creatinine 1.46, GFR 41 mL per minute, glucose 99, and
 calcium 9.2.

 

ASSESSMENT AND PLAN:

1.  Acute kidney injury - superimposed acute tubular necrosis, slowly improving.  Renal function is s
tabilizing.  Most recent creatinine is 1.46.  There is no indication for any dialytic intervention.

2.  Anemia.  Continue supportive care, p.r.n. blood transfusion.

3.  Fever - patient is currently on empiric IV antibiotics.

4.  Labile hypertension, stable.  We will continue current antihypertensive regimen as it is.

5.  Metabolic encephalopathy - clinically improving.

 

Agree with current management.  Recheck base met and CBC in a.m.

## 2017-12-04 LAB
ANION GAP SERPL CALC-SCNC: 8 MMOL/L (ref 10–20)
BASOPHILS # BLD AUTO: 0 THOU/UL (ref 0–0.2)
BASOPHILS NFR BLD AUTO: 0.4 % (ref 0–1)
BUN SERPL-MCNC: 47 MG/DL (ref 9.8–20.1)
CALCIUM SERPL-MCNC: 9.4 MG/DL (ref 7.8–10.44)
CHLORIDE SERPL-SCNC: 112 MMOL/L (ref 98–107)
CO2 SERPL-SCNC: 28 MMOL/L (ref 23–31)
CREAT CL PREDICTED SERPL C-G-VRATE: 60 ML/MIN (ref 70–130)
EOSINOPHIL # BLD AUTO: 0.3 THOU/UL (ref 0–0.7)
EOSINOPHIL NFR BLD AUTO: 2.3 % (ref 0–10)
HCT VFR BLD CALC: 25.4 % (ref 36–47)
LYMPHOCYTES # BLD: 2.8 THOU/UL (ref 1.2–3.4)
LYMPHOCYTES NFR BLD AUTO: 24.1 % (ref 21–51)
MONOCYTES # BLD AUTO: 1.1 THOU/UL (ref 0.11–0.59)
MONOCYTES NFR BLD AUTO: 9.1 % (ref 0–10)
NEUTROPHILS # BLD AUTO: 7.5 THOU/UL (ref 1.4–6.5)
RBC # BLD AUTO: 2.49 MILL/UL (ref 4.2–5.4)
WBC # BLD AUTO: 11.7 THOU/UL (ref 4.8–10.8)

## 2017-12-04 RX ADMIN — CYANOCOBALAMIN TAB 1000 MCG SCH MCG: 1000 TAB at 08:30

## 2017-12-04 RX ADMIN — FAMOTIDINE SCH MG: 10 INJECTION, SOLUTION INTRAVENOUS at 08:32

## 2017-12-04 RX ADMIN — FLUCONAZOLE SCH MLS: 2 INJECTION, SOLUTION INTRAVENOUS at 08:33

## 2017-12-04 RX ADMIN — Medication SCH ML: at 14:59

## 2017-12-04 RX ADMIN — HEPARIN SODIUM SCH UNITS: 5000 INJECTION, SOLUTION INTRAVENOUS; SUBCUTANEOUS at 08:32

## 2017-12-04 RX ADMIN — HEPARIN SODIUM SCH UNITS: 5000 INJECTION, SOLUTION INTRAVENOUS; SUBCUTANEOUS at 14:59

## 2017-12-04 RX ADMIN — Medication SCH ML: at 20:35

## 2017-12-04 RX ADMIN — HEPARIN SODIUM SCH UNITS: 5000 INJECTION, SOLUTION INTRAVENOUS; SUBCUTANEOUS at 20:31

## 2017-12-04 NOTE — PRG
DATE OF SERVICE:  12/04/2017

 

SUBJECTIVE:  Ms. Leach appeared more alert today.  Blood pressure also appears stable.

 

PHYSICAL EXAMINATION:

VITAL SIGNS:  Blood pressure 167/58, pulse 89, respirations 20.

LUNGS:  Clear to auscultation.

HEART:  Regular rate and rhythm.

ABDOMEN:  Soft, nontender, and nondistended.

EXTREMITIES:  No edema.

 

PERTINENT LABORATORY DATA:  Hemoglobin 8.2, creatinine 1.37 down from 1.46.

 

IMPRESSION:

1.  Hypertensive encephalopathy.

2.  Metabolic encephalopathy.

3.  Chronic kidney disease, now improved.

 

RECOMMENDATIONS:  Continue current medical therapy.  Her blood pressure appears more stable.  Her cre
atinine was also improved.  She is awaiting placement.  I will follow Ms. Leach as an outpatient.  VIC holliday reconsult if needed.

## 2017-12-04 NOTE — PRG
DATE OF SERVICE:  12/04/2017

 

SUBJECTIVE:  Ms. Leach is confused.  She wore BiPAP last night for sleep apnea.  She has a Dobhoff t
ube in place.

 

OBJECTIVE:

VITAL SIGNS:  On exam, temperature is 98.4 with a T-max of 100.5, pulse 88, respirations 21, O2 sat 9
4%, blood pressure 183/66.

HEENT:  Unremarkable.

NECK:  No JVD.

LUNGS:  Clear, but distant breath sounds.

CARDIAC:  S1 and S2 regular.

ABDOMEN:  Soft.

EXTREMITIES:  No edema.

 

LABORATORY DATA:  White blood cell count 11.7, hematocrit 25.4, platelet count 231.  Sodium 143, pota
ssium 4.8, chloride 112, CO2 of 28, BUN 47, creatinine 1.3, glucose 134.

 

ASSESSMENT:

1.  Continued mild encephalopathy.

2.  Possible stroke.

3.  Possible underlying sleep apnea.

4.  Chronic kidney disease.

5.  Hypertension.

 

PLAN:

1.  Blood pressure management per Cardiology.

2.  Swallowing study later today.

3.  We would avoid any medications that could potentially for encephalopathy.

## 2017-12-04 NOTE — PDOC.PN
- Subjective


Encounter Start Date: 12/04/17


Encounter Start Time: 14:30





Patient seen and examined. No new complaints. No overnight events. Mentation 

improving.





- Objective


MAR Reviewed: Yes


Vital Signs & Weight: 


 Vital Signs (12 hours)











  Temp Pulse Resp BP Pulse Ox


 


 12/04/17 16:00  98.3 F  82  16  149/53 H  96


 


 12/04/17 11:04  98.4 F  81  18  141/55 H  96


 


 12/04/17 08:30   88   


 


 12/04/17 08:00  98.4 F  88  20  


 


 12/04/17 07:34  98.4 F  88  21 H  183/66 H  94 L








 Weight











Admit Weight                   250 lb


 


Weight                         273 lb 8 oz














I&O: 


 











 12/03/17 12/04/17 12/05/17





 06:59 06:59 06:59


 


Intake Total 0 770 580


 


Output Total 219143 1715 550


 


Balance -[phone] -630 30











Result Diagrams: 


 12/04/17 05:15





 12/04/17 05:15


Radiology Reviewed by me: Yes (CXR - no infiltrate)


EKG Reviewed by me: Yes (Tele SR)





Phys Exam





- Physical Examination


Constitutional: NAD


Respiratory: no wheezing, no rhonchi


Dec AE at bases


Cardiovascular: RRR, no rub


Gastrointestinal: soft, non-tender, positive bowel sounds


Musculoskeletal: edema present


Neurological: moves all 4 limbs





Dx/Plan





- Plan


continue antibiotics, DVT proph w/heparin, DVT proph w/SCDs





IMPRESSION:


1. Toxic Metabolic Encephalopathy - multifactorial improving


2. CAPRI/CKD 3 - improving


3. Enterococcus UTI


4. HTN with Hypertensive urgency - BP better


5. TIA vs ?CVA


6. Swallow dysfunction


7. Vit B12 def


7. Hyponatremia/Hyperkalemia -resolved





PLAN:


* Cont DHT feeding


* SLP following


* Cont current meds as below


* DC Fluconazole


* Cont to monitor


* One dose of IM Vit B12








Review of Systems





- Review of Systems


Respiratory: negative: Cough, Dry, Shortness of Breath, Hemoptysis, SOB with 

Excertion, Pleuritic Pain, Sputum, Wheezing


Cardiovascular: negative: Chest Pain, Palpitations, Orthopnea, Paroxysmal Noc. 

Dyspnea, Edema, Light Headedness





- Medications/Allergies


Allergies/Adverse Reactions: 


 Allergies











Allergy/AdvReac Type Severity Reaction Status Date / Time


 


iodine Allergy Severe Hives Verified 01/13/14 00:20


 


zoster vaccine live Allergy Severe Anaphylaxis Verified 01/13/14 00:20





[From ZOSTAVAX (PF)]     











Medications: 


 Current Medications





Acetaminophen (Tylenol)  650 mg PO Q4H PRN


   PRN Reason: Headache/Fever or Pain


   Last Admin: 12/03/17 17:22 Dose:  650 mg


Al Hydroxide/Mg Hydroxide (Maalox)  30 ml PO Q6H PRN


   PRN Reason: Heartburn  or Indigestion


Albuterol Sulfate (Albuterol Sulfate)  1.25 mg NEB Q8H PRN


   PRN Reason:  SOB


   Last Admin: 12/01/17 18:41 Dose:  1.25 mg


Allopurinol (Zyloprim)  100 mg PO DAILY [facility]


   Last Admin: 12/04/17 08:31 Dose:  100 mg


Amlodipine Besylate (Norvasc)  5 mg PO DAILY [facility]


   Last Admin: 12/04/17 08:30 Dose:  5 mg


Aspirin (Ecotrin)  325 mg PO DAILY [facility]


   Last Admin: 12/04/17 08:30 Dose:  325 mg


Atorvastatin Calcium (Lipitor)  10 mg PO HS [facility]


   Last Admin: 12/03/17 21:53 Dose:  10 mg


Carvedilol (Coreg)  25 mg PO BID-WM [facility]


   Last Admin: 12/04/17 16:20 Dose:  Not Given


Clonidine (Catapres)  0.1 mg PO Q4H PRN


   PRN Reason: Systolic BP > 180


   Last Admin: 11/25/17 08:44 Dose:  0.1 mg


Cyanocobalamin (Vitamin B-12)  1,000 mcg PO DAILY [facility]


   Last Admin: 12/04/17 08:30 Dose:  1,000 mcg


Docusate Sodium (Colace)  100 mg PO BID [facility]


   Last Admin: 12/04/17 08:33 Dose:  Not Given


Famotidine (Pepcid)  20 mg SLOW IVP DAILY [facility]


   Last Admin: 12/04/17 08:32 Dose:  20 mg


Folic Acid (Folvite)  1 mg PO DAILY [facility]


   Last Admin: 12/04/17 08:30 Dose:  1 mg


Guaifenesin (Robitussin Sf)  200 mg PO Q4H PRN


   PRN Reason: Cough


   Last Admin: 11/17/17 20:47 Dose:  200 mg


Heparin Sodium (Porcine) (Heparin)  5,000 units SC TID [facility]


   Last Admin: 12/04/17 14:59 Dose:  5,000 units


Hydralazine HCl (Apresoline)  10 mg SLOW IVP Q4H PRN


   PRN Reason: Systolic BP > 180


   Last Admin: 12/03/17 15:40 Dose:  10 mg


Ciprofloxacin/Dextrose 400 mg/ (Device)  200 mls @ 200 mls/hr IVPB 1000 [facility]


   Last Admin: 12/04/17 08:32 Dose:  200 mls


Isosorbide Mononitrate (Imdur)  60 mg PO DAILY [facility]


   Last Admin: 12/04/17 08:30 Dose:  60 mg


Loperamide HCl (Imodium)  2 mg PO PRN PRN


   PRN Reason: Diarrhea/Loose Stools


Loratadine (Claritin)  10 mg PO DAILYPRN PRN


   PRN Reason: Sinus Symptoms


Magnesium Hydroxide (Milk Of Magnesium)  30 ml PO DAILYPRN PRN


   PRN Reason: Constipation


Minoxidil (Minoxidil)  2.5 mg PO DAILY [facility]


   Last Admin: 12/04/17 08:30 Dose:  2.5 mg


Ondansetron HCl (Zofran Odt)  4 mg PO Q6H PRN


   PRN Reason: Nausea/Vomiting


   Last Admin: 11/19/17 08:03 Dose:  4 mg


Ondansetron HCl (Zofran)  4 mg IVP Q6H PRN


   PRN Reason: Nausea/Vomiting


   Last Admin: 11/27/17 08:07 Dose:  4 mg


Polyethylene Glycol (Miralax)  17 gm PO BID PRN


   PRN Reason: Constipation


Primidone (Mysoline)  250 mg PO BID [facility]


Senna (Senokot)  1 tab PO BID PRN


   PRN Reason: Constipation


Simethicone (Mylicon Chewable)  80 mg PO [facility] PRN


   PRN Reason: Gas Pain


   Last Admin: 11/23/17 21:52 Dose:  80 mg


Sodium Chloride (Ocean Nasal Spray 0.65%)  0 ml EA NARE QIDPRN PRN


   PRN Reason: Nasal Congestion


Sodium Chloride (Flush - Normal Saline)  10 ml IVF Q12HR [facility]


   Last Admin: 12/04/17 14:59 Dose:  10 ml


Sodium Chloride (Flush - Normal Saline)  10 ml IVF PRN PRN


   PRN Reason: Saline Flush


   Last Admin: 11/24/17 06:33 Dose:  10 ml


Sodium Chloride (Flush - Normal Saline)  10 ml IVF PRN PRN


   PRN Reason: Saline Flush


   Last Admin: 11/28/17 18:23 Dose:  10 ml

## 2017-12-04 NOTE — RAD
KUB:

 

INDICATION: 

Dobbhoff feeding tube placement.

 

IMPRESSION: 

Dobbhoff feeding tube tip is seen near the junction of the 2nd and 3rd portion of the duodenum.  The 
remainder of the examination is unchanged from the comparison dated 12/3/17.

 

POS: MARGARITA

## 2017-12-04 NOTE — RAD
AP CHEST:

 

Indication: Shortness of breath. 

 

Comparison: 2-23-17

 

FINDINGS: 

There has been interval placement of a Dobbhoff feeding tube. There is a new left sided PICC line, se
en in the region of the SVC. Lungs are clear. Heart size is mildly prominent but still. Pulmonary vas
culature appears within normal limits. There is some blunting of the costophrenic angle suspicious fo
r small bilateral pleural effusions. 

 

IMPRESSION: 

1. Cardiomegaly with small bilateral pleural effusions. 

2. Dobbhoff feeding tube and left sided PICC line.

 

POS: [facility]

## 2017-12-05 LAB
ANION GAP SERPL CALC-SCNC: 11 MMOL/L (ref 10–20)
BUN SERPL-MCNC: 44 MG/DL (ref 9.8–20.1)
CALCIUM SERPL-MCNC: 9.7 MG/DL (ref 7.8–10.44)
CHLORIDE SERPL-SCNC: 114 MMOL/L (ref 98–107)
CO2 SERPL-SCNC: 25 MMOL/L (ref 23–31)
CREAT CL PREDICTED SERPL C-G-VRATE: 67 ML/MIN (ref 70–130)
MAGNESIUM SERPL-MCNC: 1.5 MG/DL (ref 1.6–2.6)

## 2017-12-05 RX ADMIN — Medication SCH ML: at 10:49

## 2017-12-05 RX ADMIN — FAMOTIDINE SCH MG: 10 INJECTION, SOLUTION INTRAVENOUS at 09:42

## 2017-12-05 RX ADMIN — HEPARIN SODIUM SCH UNITS: 5000 INJECTION, SOLUTION INTRAVENOUS; SUBCUTANEOUS at 15:16

## 2017-12-05 RX ADMIN — HEPARIN SODIUM SCH UNITS: 5000 INJECTION, SOLUTION INTRAVENOUS; SUBCUTANEOUS at 09:42

## 2017-12-05 RX ADMIN — Medication SCH ML: at 20:06

## 2017-12-05 RX ADMIN — HEPARIN SODIUM SCH UNITS: 5000 INJECTION, SOLUTION INTRAVENOUS; SUBCUTANEOUS at 20:06

## 2017-12-05 RX ADMIN — CYANOCOBALAMIN TAB 1000 MCG SCH MCG: 1000 TAB at 09:42

## 2017-12-05 NOTE — PDOC.PN
- Subjective


Encounter Start Date: 12/05/17


Encounter Start Time: 11:30





Patient seen and examined. Mentation same. No overnight events





- Objective


MAR Reviewed: Yes


Vital Signs & Weight: 


 Vital Signs (12 hours)











  Temp Pulse Resp BP Pulse Ox


 


 12/05/17 20:00  99.8 F H  87  22 H  121/35 L  97


 


 12/05/17 15:44  99.0 F  88  20  123/48 L  95








 Weight











Admit Weight                   250 lb


 


Weight                         272 lb 6.4 oz














I&O: 


 











 12/04/17 12/05/17 12/06/17





 06:59 06:59 06:59


 


Intake Total 770 1240 670


 


Output Total 1400 1050 750


 


Balance -630 190 -80











Result Diagrams: 


 12/06/17 10:52





 12/06/17 05:42


EKG Reviewed by me: Yes (Tele SR)





Phys Exam





- Physical Examination


Constitutional: NAD


Lethargy


Respiratory: no wheezing, no rhonchi


Cardiovascular: RRR, no rub


Gastrointestinal: soft, positive bowel sounds


Musculoskeletal: no edema


Neurological: non-focal, moves all 4 limbs





Dx/Plan





- Plan





IMPRESSION:


1. Toxic Metabolic Encephalopathy - multifactorial improving


2. CAPRI/CKD 3 - improving


3. Enterococcus  - Catheter associated UTI 


4. HTN with Hypertensive urgency - improving


5. TIA vs ?CVA


6. Swallow dysfunction


7. Vit B12 def - on replacement


7. Hyponatremia/Hyperkalemia -resolved





PLAN:


* on DHT feeding


* SLP following - Will start modified diet


* AM labs


* Cont current meds as below


* Cont to monitor








Review of Systems





- Review of Systems


Other: 





Cannot obtain due to sedation





- Medications/Allergies


Allergies/Adverse Reactions: 


 Allergies











Allergy/AdvReac Type Severity Reaction Status Date / Time


 


iodine Allergy Severe Hives Verified 01/13/14 00:20


 


zoster vaccine live Allergy Severe Anaphylaxis Verified 01/13/14 00:20





[From ZOSTAVAX (PF)]     











Medications: 


 Current Medications





Acetaminophen (Tylenol)  650 mg PO Q4H PRN


   PRN Reason: Headache/Fever or Pain


   Last Admin: 12/05/17 13:13 Dose:  650 mg


Al Hydroxide/Mg Hydroxide (Maalox)  30 ml PO Q6H PRN


   PRN Reason: Heartburn  or Indigestion


Albuterol Sulfate (Albuterol Sulfate)  1.25 mg NEB Q8H PRN


   PRN Reason:  SOB


   Last Admin: 12/01/17 18:41 Dose:  1.25 mg


Allopurinol (Zyloprim)  100 mg PO DAILY [facility]


   Last Admin: 12/05/17 09:42 Dose:  100 mg


Amlodipine Besylate (Norvasc)  5 mg PO DAILY [facility]


   Last Admin: 12/05/17 09:42 Dose:  5 mg


Aspirin (Ecotrin)  325 mg PO DAILY [facility]


   Last Admin: 12/05/17 09:42 Dose:  325 mg


Atorvastatin Calcium (Lipitor)  10 mg PO HS [facility]


   Last Admin: 12/05/17 20:06 Dose:  10 mg


Carvedilol (Coreg)  25 mg PO BID-WM [facility]


   Last Admin: 12/05/17 16:47 Dose:  25 mg


Clonidine (Catapres)  0.1 mg PO Q4H PRN


   PRN Reason: Systolic BP > 180


   Last Admin: 11/25/17 08:44 Dose:  0.1 mg


Cyanocobalamin (Vitamin B-12)  1,000 mcg PO DAILY [facility]


   Last Admin: 12/05/17 09:42 Dose:  1,000 mcg


Docusate Sodium (Colace)  100 mg PO BID [facility]


   Last Admin: 12/05/17 20:06 Dose:  100 mg


Famotidine (Pepcid)  20 mg SLOW IVP DAILY [facility]


   Last Admin: 12/05/17 09:42 Dose:  20 mg


Folic Acid (Folvite)  1 mg PO DAILY [facility]


   Last Admin: 12/05/17 09:42 Dose:  1 mg


Guaifenesin (Robitussin Sf)  200 mg PO Q4H PRN


   PRN Reason: Cough


   Last Admin: 11/17/17 20:47 Dose:  200 mg


Heparin Sodium (Porcine) (Heparin)  5,000 units SC TID [facility]


   Last Admin: 12/05/17 20:06 Dose:  5,000 units


Hydralazine HCl (Apresoline)  10 mg SLOW IVP Q4H PRN


   PRN Reason: Systolic BP > 180


   Last Admin: 12/03/17 15:40 Dose:  10 mg


Ciprofloxacin/Dextrose 400 mg/ (Device)  200 mls @ 200 mls/hr IVPB 1000 [facility]


   Last Admin: 12/05/17 09:44 Dose:  200 mls


Isosorbide Mononitrate (Imdur)  60 mg PO DAILY [facility]


   Last Admin: 12/05/17 09:43 Dose:  60 mg


Loperamide HCl (Imodium)  2 mg PO PRN PRN


   PRN Reason: Diarrhea/Loose Stools


Loratadine (Claritin)  10 mg PO DAILYPRN PRN


   PRN Reason: Sinus Symptoms


Magnesium Hydroxide (Milk Of Magnesium)  30 ml PO DAILYPRN PRN


   PRN Reason: Constipation


Minoxidil (Minoxidil)  2.5 mg PO DAILY [facility]


   Last Admin: 12/05/17 09:43 Dose:  2.5 mg


Ondansetron HCl (Zofran Odt)  4 mg PO Q6H PRN


   PRN Reason: Nausea/Vomiting


   Last Admin: 11/19/17 08:03 Dose:  4 mg


Ondansetron HCl (Zofran)  4 mg IVP Q6H PRN


   PRN Reason: Nausea/Vomiting


   Last Admin: 11/27/17 08:07 Dose:  4 mg


Polyethylene Glycol (Miralax)  17 gm PO BID PRN


   PRN Reason: Constipation


Primidone (Mysoline)  250 mg PO BID [facility]


   Last Admin: 12/05/17 20:07 Dose:  250 mg


Senna (Senokot)  1 tab PO BID PRN


   PRN Reason: Constipation


Simethicone (Mylicon Chewable)  80 mg PO [facility] PRN


   PRN Reason: Gas Pain


   Last Admin: 11/23/17 21:52 Dose:  80 mg


Sodium Chloride (Ocean Nasal Spray 0.65%)  0 ml EA NARE QIDPRN PRN


   PRN Reason: Nasal Congestion


Sodium Chloride (Flush - Normal Saline)  10 ml IVF Q12HR [facility]


   Last Admin: 12/05/17 20:06 Dose:  10 ml


Sodium Chloride (Flush - Normal Saline)  10 ml IVF PRN PRN


   PRN Reason: Saline Flush


   Last Admin: 11/24/17 06:33 Dose:  10 ml


Sodium Chloride (Flush - Normal Saline)  10 ml IVF PRN PRN


   PRN Reason: Saline Flush


   Last Admin: 11/28/17 18:23 Dose:  10 ml

## 2017-12-05 NOTE — PRG
DATE OF SERVICE:  12/05/2017

 

Ms. Leach is sleeping on the BiPAP that she wears for sleep apnea.  She indicates that she is cold t
his morning.

 

PHYSICAL EXAMINATION: 

VITAL SIGNS:  Her temperature is 97.1, pulse 80, respirations 14, O2 sat 96%, blood pressure ranging 
from 146/55 to 200/80.

HEENT:  Unremarkable.

NECK:   No JVD.

CHEST:  Clear.

CARDIAC:  S1 and S2 regular.

ABDOMEN:  Soft.

 

LABORATORY DATA:  Sodium 145, potassium 5.1, chloride 114, CO2 25, BUN 44, creatinine 1.3, glucose 13
9.

 

ASSESSMENT:

1.  Encephalopathy, which has slightly improved.

2.  Hypertension.

3.  Chronic kidney disease.

4.  Obstructive sleep apnea.

 

PLAN:  The patient appears to have several chronic problems which are likely to take some time to imp
rove.  She would probably be best served by going to a skilled facility for some time, LTAC would als
o be a possibility.  Her long-term prognosis is poor.  The family seems to have a very poor understan
ding of the patient's underlying condition.

## 2017-12-05 NOTE — PQF
CLINICAL DOCUMENTATION IMPROVEMENT CLARIFICATION FORM:  ICD-10 Updated

PLEASE DO AN ADDENDUM TO THE PROGRESS NOTE WITH ANY DOCUMENTATION UPDATES OR 
ADDITIONS AND CARRY THROUGH TO DC SUMMARY.   THANK YOU.



DATE:   12/5/17                                       ATTN:   Dr. Vazquez



Please exercise your independent, professional judgment in responding to the 
clarification form. 

Clinical indicators are provided on the bottom of this form for your review



Please check appropriate box(s):

[  ] UTI  please specify if due to or related to (as applicable):            

              [  ] Indwelling catheter    

              [  ] Unable to determine etiology                   

     

[  ] Contaminated urine specimen without UTI

[  ] Other diagnosis ___________

[  ] Unable to determine





In addition, please specify:

Present on Admission (POA):  [  ] Yes             [  ] No             [  ] 
Unable to determine



For continuity of documentation, please document condition throughout progress 
notes and discharge summary.  Thank You.



CLINICAL INDICATORS - SIGNS / SYMPTOMS / LABS



ORDER FOR NURSING 11/26: URINE CATH: WILEY ONE



PN 11/28: UA HAS DEVELOPED BACTERIA & WBC SINCE ADMISSION. WILL RECHECK TODAY

                 & CULTURE URINE. 



PN 12/4: ENTEROCOCCUS UTI



RISKS: 

   H&P: LIVES AT HOME. SHE IS MOSTLY BED BOUND. HX OF RECURRENT UTI, 
FIBROMYALGIA, 

            MORBID OBESITY, HYPERTENSION. CHRONIC DIASTOLIC HEART FAILURE. CKD 3
, 

            HX  OF CVA. 



TREATMENT:

CPOE 11/28: CIPRO 400 MG IV 

CPOE 11/28: URINE CULTURE . FROM URINE WILEY CATHETER. 









Thank you,

Radha



(This form is maintained as a part of the permanent medical record)

 2015 [employer]  All Rights Reserved

Radha Cardona RN, BSN    [email or web address]    Office: [phone]

                                                              

 



[facility]

## 2017-12-06 LAB
ANION GAP SERPL CALC-SCNC: 9 MMOL/L (ref 10–20)
BASOPHILS # BLD AUTO: 0 THOU/UL (ref 0–0.2)
BASOPHILS NFR BLD AUTO: 0.1 % (ref 0–1)
BUN SERPL-MCNC: 46 MG/DL (ref 9.8–20.1)
CALCIUM SERPL-MCNC: 9.6 MG/DL (ref 7.8–10.44)
CHLORIDE SERPL-SCNC: 113 MMOL/L (ref 98–107)
CO2 SERPL-SCNC: 28 MMOL/L (ref 23–31)
CREAT CL PREDICTED SERPL C-G-VRATE: 63 ML/MIN (ref 70–130)
EOSINOPHIL # BLD AUTO: 0.2 THOU/UL (ref 0–0.7)
EOSINOPHIL NFR BLD AUTO: 1.7 % (ref 0–10)
HCT VFR BLD CALC: 24.4 % (ref 36–47)
LYMPHOCYTES # BLD: 1.7 THOU/UL (ref 1.2–3.4)
LYMPHOCYTES NFR BLD AUTO: 13.7 % (ref 21–51)
MODIFIED ALLEN'S TEST: POSITIVE
MONOCYTES # BLD AUTO: 1.4 THOU/UL (ref 0.11–0.59)
MONOCYTES NFR BLD AUTO: 11.4 % (ref 0–10)
NEUTROPHILS # BLD AUTO: 8.9 THOU/UL (ref 1.4–6.5)
RBC # BLD AUTO: 2.34 MILL/UL (ref 4.2–5.4)
SODIUM SERPL-SCNC: 146 MMOL/L (ref 135–148)
VENT: NO
WBC # BLD AUTO: 12.1 THOU/UL (ref 4.8–10.8)

## 2017-12-06 RX ADMIN — HEPARIN SODIUM SCH UNITS: 5000 INJECTION, SOLUTION INTRAVENOUS; SUBCUTANEOUS at 08:48

## 2017-12-06 RX ADMIN — Medication SCH ML: at 20:56

## 2017-12-06 RX ADMIN — FAMOTIDINE SCH MG: 10 INJECTION, SOLUTION INTRAVENOUS at 08:48

## 2017-12-06 RX ADMIN — HEPARIN SODIUM SCH UNITS: 5000 INJECTION, SOLUTION INTRAVENOUS; SUBCUTANEOUS at 20:55

## 2017-12-06 RX ADMIN — CYANOCOBALAMIN TAB 1000 MCG SCH MCG: 1000 TAB at 08:47

## 2017-12-06 RX ADMIN — Medication SCH ML: at 08:49

## 2017-12-06 RX ADMIN — HEPARIN SODIUM SCH UNITS: 5000 INJECTION, SOLUTION INTRAVENOUS; SUBCUTANEOUS at 14:59

## 2017-12-06 NOTE — CT
HEAD CT NONCONTRAST:

 

Indication: Altered mental status. 

 

Comparison: 11-29-17

 

FINDINGS: 

There is no evidence of intracranial hemorrhage, mass effect, midline shift or ventriculomegaly. Indw
elling partially imaged nasogastric tube is present. 

 

IMPRESSION: 

No acute intracranial abnormalities. 

 

POS: [facility]

## 2017-12-06 NOTE — RAD
PORTABLE CHEST ONE VIEW:

 

Date: 12-6-17

Time: 10:58 a.m.

 

History: Shortness of breath. 

 

Comparison: 5-4-17

 

FINDINGS/IMPRESSION: 

Left upper extremity PICC line and feeding tube are again seen. The heart size is enlarged. There is 
pulmonary vascular congestion with bilateral pleural effusions. No pneumothoraces are seen. There are
 post op changes in the right shoulder. 

 

 

POS: [facility]

## 2017-12-06 NOTE — CON
DATE OF CONSULTATION:  12/06/2017

 

REASON FOR CONSULTATION:  Change in mental status.

 

HISTORY OF PRESENT ILLNESS: This is an 83-year-old who has been here for 
approximately 3 weeks, initially admitted on 11/17/2017 with a history of prior 
UTIs, essential tremor on primidone, obesity, hypertension, and chronic 
bronchitis, sleep apnea, and prior CVA, who is mostly bed bound in the home 
setting, but she does ambulate with the walker.  The patient has had recurrent 
falls, which were ascribed to be essential tremor, for which the primidone was 
prescribed.  The dose of primidone has been increased a few months ago by her 
neurologist.  She had 5 falls during the 24 hours prior to admission and then 
she was felt to be incoherent and slurred speech and she was then brought to 
the emergency room and admitted.  Initial CT scan did not show any changes of 
significance.  The initial impression was a transient ischemic attack, rule out 
CVA, slurred speech with incoherence, weakness, microcytosis, essential tremor.
  During this admission, she has had an echocardiogram, which demonstrated an 
EF around 65%, diastolic dysfunction, moderate tricuspid regurg, elevation 
pulmonary artery pressures.  On 11/21/2017, the patient underwent CT angiogram 
to evaluate for causes of refractory hypertension and it showed mild narrowing 
at the origin of the left renal artery.  Post-cholecystectomy changes and a 
dilated extrahepatic common duct.  Because of change in mental status, patient 
underwent a Neurology consultation and brain MRI.  MRI showed diffuse cortical 
atrophy and chronic ischemic small vessel disease, but no acute infarction or 
other abnormalities noted.  Dr. Leiva, Neurology was consulted and she concluded 
that the patient's presentation was consistent with encephalopathy.  Over the 
past few days, there has been some elevation in the neutrophil count as noted 
below.  Currently, Ms. Leach has aroused, she recognizes all family members 
and follows commands.  She denies headaches, no visual symptoms, sore throat, 
odynophagia, dysphagia, no dyspnea or abdominal pain, no genitourinary symptoms 
other than a Hinds catheter related symptoms.  No diarrhea.

 

PAST MEDICAL HISTORY:  Includes, UTIs, fibromyalgia, essential tremor, 
chronically on primidone, osteoarthritis, obesity, hypertension, sleep apnea, 
deconditioning, renal insufficiency, and prior CVA.

 

PAST SURGICAL HISTORY:  Hysterectomy, cholecystectomy, bilateral knee 
replacements, and right shoulder arthroscopy.

 

SOCIAL HISTORY:  Drinks only occasionally.  Never a smoker.  Lives with the 
family in the area.

 

FAMILY HISTORY:  Noncontributory.

 

CURRENT MEDICATIONS:  Include; Tylenol, Maalox, Zyloprim, Norvasc, Ecotrin, 
Lipitor, Coreg, Pepcid, guaifenesin, hydralazine with Zofran, primidone 250 mg 
at bedtime, and Mylicon.

 

PHYSICAL EXAMINATION:

VITAL SIGNS:  T-max 100.5.  Few days ago, she has been afebrile.  Up until 8:00 
p.m. yesterday, temperature went up to 99.8, she is now 98.4.  Pulse 76, 
respirations 18-24, and O2 saturation 99%.

SKIN:  With no areas of skin breakdown.  She has peripheral IV access and a 
Hinds catheter in place.  There is periorbital edema.

HEENT:  Sclerae are white.  Pupils are equal and reactive.  The patient open 
eyes spontaneously and follows commands.  Oral cavity with moist mucosa, no 
lesions.

NECK:  Supple, no jugular venous distention.

LUNGS:  With symmetric air entry without obvious crackles or wheezing.

HEART:  S1 and S2, regular rate.  No S3 or S4.

ABDOMEN:  Soft.  No ascites, organomegaly.  No distention, no bladder 
distention.

EXTREMITIES:  No joint inflammatory activity or pain noted.  There is diffuse 
edema with 2-3+ edema in lower extremities.  She is able to move all 
extremities but with limitations due to edema and weakness.  Pulses are 1+ in 
dorsalis pedis.

NEUROLOGIC:  Plantar responses are indifferent.  She is awake, follows commands 
and recognizes relatives.

 

LABORATORY DATA:  White cell count has ranged from 6.1 and steadily increased 
over the past few days to 12.1.  PH 7.39.  On 11/26/2017, pCO2 of 42 and pO2 of 
70.  Chemistry with creatinine 1.32, which is a little bit higher than her 
baseline of 0.99, nonetheless, it is improved from maximum of 4.07 on 11/28/
2017.  Liver profile; albumin 3.1, globulin 3.3.  B12 of 287.  TSH 2.27.  
Cortisol 15.  The patient had syphilis serology nonreactive.  Microbiology with 
Enterococcus species from urine culture obtained from Hinds catheterization 25,
000 to 50,000 CFUs per mL, most recent brain CT from 12/06/2017 with no acute 
intracranial abnormalities.

 

ASSESSMENT:  Hypertension, quite refractory to treatment, transient renal 
insufficiency, chronic essential tremor managed with primidone, change in 
mental status, recurrent falls and concerns regarding her change in mental 
status.

 

DISCUSSION:  The patient has fluctuating mental status, which is indicative of 
a toxic metabolic encephalopathy.  The most likely scenario here is primidone, 
which is one of the anti-seizure medications, most likely to be associated with 
CNS side effects including sedation, dizziness, and confusional state.  The 
dose has been decreased by Dr. Vera and may have to be discontinued and a 
different modality opted for management of essential tremor.  May want to 
consult Dr. Ketan Ndiaye regarding management of essential tremor, in that 
regard for other options that are less associated with sedation.  I would not 
advise any further workup for her mental status changes, and would advise 
further reductions in the dose of primidone even discontinuation.  She may have 
additional factors for example, she has a history of obstructive sleep apnea 
and may need reevaluation that regard since those may potentiate the effect of 
primidone.

 

MTDD

## 2017-12-06 NOTE — RAD
ABDOMEN ONE VIEW:

 

History: 

83-year-old female with abdominal pain. 

 

Comparison: 

12-4-17

 

FINDINGS: 

Dobbhoff tube with the tip probably extending into the third portion of the duodenum. Scattered gas a
nd fecal material in the colon. No evidence for large or small bowel obstruction. 

 

IMPRESSION: 

Dobbhoff tube with the tip in the region of the duodenum. Gas and fecal material in the colon without
 evidence for large or small bowel obstruction. 

 

POS: [facility]

## 2017-12-06 NOTE — PDOC.PN
- Subjective


Encounter Start Date: 12/06/17


Encounter Start Time: 11:30





Patient seen and examined. More confused. Hypotensive per RN - fluid bolus 

ordered per Cardio. No overnight events





- Objective


MAR Reviewed: Yes


Vital Signs & Weight: 


 Vital Signs (12 hours)











  Temp Pulse Resp BP BP Pulse Ox


 


 12/06/17 18:34      129/43 L 


 


 12/06/17 17:50      109/31 L 


 


 12/06/17 15:58  97.0 F L  75  20   121/41 L  96


 


 12/06/17 11:15  98.4 F  76  24 H   137/50 L  99


 


 12/06/17 10:20      74/28 L 


 


 12/06/17 10:00      85/31 L 


 


 12/06/17 08:48   80   150/62 H  


 


 12/06/17 08:45  98.4 F  80  18    96








 Weight











Admit Weight                   250 lb


 


Weight                         271 lb 6.4 oz














I&O: 


 











 12/05/17 12/06/17 12/07/17





 06:59 06:59 06:59


 


Intake Total 1240 1330 840


 


Output Total 1050 1050 250


 


Balance 190 280 590











Result Diagrams: 


 12/06/17 10:52





 12/06/17 05:42


Additional Labs: 


 Accuchecks











  12/06/17





  10:24


 


POC Glucose  172 H











EKG Reviewed by me: Yes (Tele SR)





Phys Exam





- Physical Examination


Lethargic


Respiratory: no wheezing, no rhonchi


Dec AE at bases


Cardiovascular: RRR, no rub


Gastrointestinal: soft, non-tender, no distention, positive bowel sounds


Musculoskeletal: edema present


Neuro/Psych - Lethargic/Cannot assess due to current mentation





Dx/Plan





- Plan


PT/OT, DVT proph w/heparin, DVT proph w/SCDs





IMPRESSION:


1. Toxic Metabolic Encephalopathy - multifactorial


2. CAPRI/CKD 3 - improving


3. Enterococcus  - Catheter associated UTI - completed Atbx


4. Hypotension - prob due to hypovolemia


5. HTN with Hypertensive urgency - improving


6. TIA vs ?CVA


7. Swallow dysfunction


8. Vit B12 def - on replacement


9. Hyponatremia/Hyperkalemia -resolved





PLAN:


* Consult Neuro/ID


* CXR/Abd XR 


* on DHT feeding


* Gentle IV hydration


* SLP following


* AM labs


* Cont current meds as below


* Cont to monitor











Review of Systems





- Review of Systems


Other: 





Cannot obtain due to current cognitive status





- Medications/Allergies


Allergies/Adverse Reactions: 


 Allergies











Allergy/AdvReac Type Severity Reaction Status Date / Time


 


iodine Allergy Severe Hives Verified 01/13/14 00:20


 


zoster vaccine live Allergy Severe Anaphylaxis Verified 01/13/14 00:20





[From ZOSTAVAX (PF)]     











Medications: 


 Current Medications





Acetaminophen (Tylenol)  650 mg PO Q4H PRN


   PRN Reason: Headache/Fever or Pain


   Last Admin: 12/05/17 13:13 Dose:  650 mg


Al Hydroxide/Mg Hydroxide (Maalox)  30 ml PO Q6H PRN


   PRN Reason: Heartburn  or Indigestion


Albuterol Sulfate (Albuterol Sulfate)  1.25 mg NEB Q8H PRN


   PRN Reason:  SOB


   Last Admin: 12/01/17 18:41 Dose:  1.25 mg


Allopurinol (Zyloprim)  100 mg PO DAILY [facility]


   Last Admin: 12/06/17 08:47 Dose:  100 mg


Amlodipine Besylate (Norvasc)  5 mg PO DAILY [facility]


   Last Admin: 12/06/17 08:48 Dose:  5 mg


Aspirin (Ecotrin)  325 mg PO DAILY [facility]


   Last Admin: 12/06/17 08:47 Dose:  325 mg


Atorvastatin Calcium (Lipitor)  10 mg PO HS [facility]


   Last Admin: 12/05/17 20:06 Dose:  10 mg


Carvedilol (Coreg)  25 mg PO BID-WM [facility]


   Last Admin: 12/06/17 16:21 Dose:  Not Given


Cyanocobalamin (Vitamin B-12)  1,000 mcg PO DAILY [facility]


   Last Admin: 12/06/17 08:47 Dose:  1,000 mcg


Docusate Sodium (Colace)  100 mg PO BID [facility]


   Last Admin: 12/06/17 08:47 Dose:  100 mg


Famotidine (Pepcid)  20 mg SLOW IVP DAILY [facility]


   Last Admin: 12/06/17 08:48 Dose:  20 mg


Folic Acid (Folvite)  1 mg PO DAILY [facility]


   Last Admin: 12/06/17 08:47 Dose:  1 mg


Guaifenesin (Robitussin Sf)  200 mg PO Q4H PRN


   PRN Reason: Cough


   Last Admin: 11/17/17 20:47 Dose:  200 mg


Heparin Sodium (Porcine) (Heparin)  5,000 units SC TID [facility]


   Last Admin: 12/06/17 14:59 Dose:  5,000 units


Hydralazine HCl (Apresoline)  10 mg SLOW IVP Q4H PRN


   PRN Reason: Systolic BP > 180


   Last Admin: 12/03/17 15:40 Dose:  10 mg


Dextrose/Sodium Chloride (D5 0.9% Ns)  1,000 mls @ 50 mls/hr IV .Q20H [facility]


   Last Admin: 12/06/17 13:07 Dose:  1,000 mls


Isosorbide Mononitrate (Imdur)  60 mg PO DAILY [facility]


   Last Admin: 12/06/17 08:47 Dose:  60 mg


Loperamide HCl (Imodium)  2 mg PO PRN PRN


   PRN Reason: Diarrhea/Loose Stools


Magnesium Hydroxide (Milk Of Magnesium)  30 ml PO DAILYPRN PRN


   PRN Reason: Constipation


Minoxidil (Minoxidil)  2.5 mg PO DAILY [facility]


   Last Admin: 12/06/17 08:47 Dose:  2.5 mg


Ondansetron HCl (Zofran Odt)  4 mg PO Q6H PRN


   PRN Reason: Nausea/Vomiting


   Last Admin: 11/19/17 08:03 Dose:  4 mg


Ondansetron HCl (Zofran)  4 mg IVP Q6H PRN


   PRN Reason: Nausea/Vomiting


   Last Admin: 11/27/17 08:07 Dose:  4 mg


Polyethylene Glycol (Miralax)  17 gm PO BID PRN


   PRN Reason: Constipation


Primidone (Mysoline)  250 mg PO [facility]


Senna (Senokot)  1 tab PO BID PRN


   PRN Reason: Constipation


Simethicone (Mylicon Chewable)  80 mg PO [facility] PRN


   PRN Reason: Gas Pain


   Last Admin: 11/23/17 21:52 Dose:  80 mg


Sodium Chloride (Ocean Nasal Spray 0.65%)  0 ml EA NARE QIDPRN PRN


   PRN Reason: Nasal Congestion


Sodium Chloride (Flush - Normal Saline)  10 ml IVF Q12HR [facility]


   Last Admin: 12/06/17 08:49 Dose:  10 ml


Sodium Chloride (Flush - Normal Saline)  10 ml IVF PRN PRN


   PRN Reason: Saline Flush


   Last Admin: 11/28/17 18:23 Dose:  10 ml

## 2017-12-06 NOTE — PRG
DATE OF SERVICE:  12/06/2017

 

The patient is very slow to awaken, but she will verbalize and follows some commands.

 

PHYSICAL EXAMINATION:

VITAL SIGNS:  Temperature is 98.4, pulse 78, respirations 18, O2 sat 96%, blood pressure 161/49.

HEENT:  Remarkable for an NG tube in place.

NECK:   No JVD.

CHEST:  Clear.

CARDIAC:  S1 and S2 regular.

ABDOMEN:  Obese.

EXTREMITIES:  Trace edema.

 

LABORATORY DATA:  Sodium 145, potassium 4.9, chloride 132, CO2 28, BUN 46, creatinine 1.3, glucose 15
2.

 

ASSESSMENT:  Continued excessive sedation despite BiPAP.

 

PLAN:  I will go ahead and decrease the dose of her Mysoline to once daily.  We will stop the Cipro s
roe she has had over 7 days of treatment for her urinary tract infection.  I do not think she will q
ualify for rehab and we should probably look for skilled nursing placement.

## 2017-12-07 LAB
ANION GAP SERPL CALC-SCNC: 9 MMOL/L (ref 10–20)
BASO STIPL BLD QL SMEAR: (no result) (100X)
BASOPHILS # BLD AUTO: 0 THOU/UL (ref 0–0.2)
BASOPHILS NFR BLD AUTO: 0.4 % (ref 0–1)
BUN SERPL-MCNC: 50 MG/DL (ref 9.8–20.1)
CALCIUM SERPL-MCNC: 9.5 MG/DL (ref 7.8–10.44)
CHLORIDE SERPL-SCNC: 114 MMOL/L (ref 98–107)
CO2 SERPL-SCNC: 28 MMOL/L (ref 23–31)
CREAT CL PREDICTED SERPL C-G-VRATE: 56 ML/MIN (ref 70–130)
EOSINOPHIL # BLD AUTO: 0.4 THOU/UL (ref 0–0.7)
EOSINOPHIL NFR BLD AUTO: 4.1 % (ref 0–10)
HCT VFR BLD CALC: 25.2 % (ref 36–47)
LYMPHOCYTES # BLD: 1.5 THOU/UL (ref 1.2–3.4)
LYMPHOCYTES NFR BLD AUTO: 15.8 % (ref 21–51)
MAGNESIUM SERPL-MCNC: 1.5 MG/DL (ref 1.6–2.6)
MONOCYTES # BLD AUTO: 0.9 THOU/UL (ref 0.11–0.59)
MONOCYTES NFR BLD AUTO: 9.7 % (ref 0–10)
NEUTROPHILS # BLD AUTO: 6.5 THOU/UL (ref 1.4–6.5)
RBC # BLD AUTO: 2.41 MILL/UL (ref 4.2–5.4)
TARGETS BLD QL SMEAR: (no result) (100X)
WBC # BLD AUTO: 9.3 THOU/UL (ref 4.8–10.8)

## 2017-12-07 RX ADMIN — CYANOCOBALAMIN TAB 1000 MCG SCH MCG: 1000 TAB at 09:24

## 2017-12-07 RX ADMIN — HEPARIN SODIUM SCH UNITS: 5000 INJECTION, SOLUTION INTRAVENOUS; SUBCUTANEOUS at 09:24

## 2017-12-07 RX ADMIN — HEPARIN SODIUM SCH UNITS: 5000 INJECTION, SOLUTION INTRAVENOUS; SUBCUTANEOUS at 15:09

## 2017-12-07 RX ADMIN — FAMOTIDINE SCH MG: 10 INJECTION, SOLUTION INTRAVENOUS at 09:24

## 2017-12-07 RX ADMIN — Medication SCH ML: at 09:54

## 2017-12-07 RX ADMIN — Medication SCH ML: at 20:28

## 2017-12-07 RX ADMIN — HEPARIN SODIUM SCH UNITS: 5000 INJECTION, SOLUTION INTRAVENOUS; SUBCUTANEOUS at 20:27

## 2017-12-07 NOTE — PDOC.PN
- Subjective


Encounter Start Date: 12/07/17


Encounter Start Time: 11:00





Patient seen and examined. Mentation slightly better. No overnight events





- Objective


MAR Reviewed: Yes


Vital Signs & Weight: 


 Vital Signs (12 hours)











  Temp Pulse Pulse Pulse Resp BP BP


 


 12/07/17 16:52       


 


 12/07/17 16:15    88  84    151/60 H


 


 12/07/17 14:55  98.7 F  80    20  


 


 12/07/17 11:09  99.6 F  77    20  


 


 12/07/17 09:23   86     168/53 H 


 


 12/07/17 08:00  97.8 F  77    18  


 


 12/07/17 06:55  97.8 F  77    18  


 


 12/07/17 06:47       


 


 12/07/17 06:45   82    20  














  BP BP Pulse Ox Pulse Ox Pulse Ox


 


 12/07/17 16:52   135/46 L   


 


 12/07/17 16:15  127/42 L    98  97


 


 12/07/17 14:55   124/45 L  97  


 


 12/07/17 11:09   102/38 L  98  


 


 12/07/17 09:23     


 


 12/07/17 08:00    98  


 


 12/07/17 06:55   120/45 L  98  


 


 12/07/17 06:47    99  


 


 12/07/17 06:45    98  








 Weight











Admit Weight                   250 lb


 


Weight                         271 lb 6.4 oz














I&O: 


 











 12/06/17 12/07/17 12/08/17





 06:59 06:59 06:59


 


Intake Total 1330 1600 90


 


Output Total 1050 600 200


 


Balance 280 1000 -110











Result Diagrams: 


 12/07/17 04:30





 12/07/17 04:30


EKG Reviewed by me: Yes (Tele SR)





Phys Exam





- Physical Examination


Constitutional: NAD


Respiratory: no wheezing, no rhonchi


Cardiovascular: RRR, no rub


Gastrointestinal: soft, non-tender, positive bowel sounds


Musculoskeletal: no edema


Neurological: moves all 4 limbs





Dx/Plan





- Plan


DVT proph w/SCDs





IMPRESSION:


1. Toxic Metabolic Encephalopathy - multifactorial


2. CAPRI/CKD 3 - improving


3. Enterococcus  - Catheter associated UTI - completed Atbx


4. Hypotension - prob due to hypovolemia - improved


5. HTN with Hypertensive urgency - improving


6. TIA vs ?CVA


7. Swallow dysfunction


8. Vit B12 def - on replacement


9. Hyponatremia/Hyperkalemia -resolved





PLAN:


* Neuro/ID input appreciated


* Primidone dced


* Cont to monitor


* Cont DHT feeding


* dc IV fluids


* SLP following


* AM labs


* Cont current meds as below


* Cont to monitor











Review of Systems





- Review of Systems


Other: 





Cannot be obtained due to sedation.





- Medications/Allergies


Allergies/Adverse Reactions: 


 Allergies











Allergy/AdvReac Type Severity Reaction Status Date / Time


 


iodine Allergy Severe Hives Verified 01/13/14 00:20


 


zoster vaccine live Allergy Severe Anaphylaxis Verified 01/13/14 00:20





[From ZOSTAVAX (PF)]     











Medications: 


 Current Medications





Acetaminophen (Tylenol)  650 mg PO Q4H PRN


   PRN Reason: Headache/Fever or Pain


   Last Admin: 12/05/17 13:13 Dose:  650 mg


Al Hydroxide/Mg Hydroxide (Maalox)  30 ml PO Q6H PRN


   PRN Reason: Heartburn  or Indigestion


Albuterol Sulfate (Albuterol Sulfate)  1.25 mg NEB Q8H PRN


   PRN Reason:  SOB


   Last Admin: 12/01/17 18:41 Dose:  1.25 mg


Allopurinol (Zyloprim)  100 mg PO DAILY [facility]


   Last Admin: 12/07/17 09:24 Dose:  100 mg


Amlodipine Besylate (Norvasc)  5 mg PO DAILY [facility]


   Last Admin: 12/07/17 09:23 Dose:  5 mg


Aspirin (Ecotrin)  325 mg PO DAILY [facility]


   Last Admin: 12/07/17 09:24 Dose:  325 mg


Atorvastatin Calcium (Lipitor)  10 mg PO HS [facility]


   Last Admin: 12/06/17 20:55 Dose:  10 mg


Carvedilol (Coreg)  25 mg PO BID-[facility]


   Last Admin: 12/07/17 16:50 Dose:  25 mg


Cyanocobalamin (Vitamin B-12)  1,000 mcg PO DAILY [facility]


   Last Admin: 12/07/17 09:24 Dose:  1,000 mcg


Docusate Sodium (Colace)  100 mg PO BID [facility]


   Last Admin: 12/07/17 09:24 Dose:  100 mg


Famotidine (Pepcid)  20 mg SLOW IVP DAILY [facility]


   Last Admin: 12/07/17 09:24 Dose:  20 mg


Folic Acid (Folvite)  1 mg PO DAILY [facility]


   Last Admin: 12/07/17 09:24 Dose:  1 mg


Guaifenesin (Robitussin Sf)  200 mg PO Q4H PRN


   PRN Reason: Cough


   Last Admin: 11/17/17 20:47 Dose:  200 mg


Heparin Sodium (Porcine) (Heparin)  5,000 units SC TID [facility]


   Last Admin: 12/07/17 15:09 Dose:  5,000 units


Hydralazine HCl (Apresoline)  10 mg SLOW IVP Q4H PRN


   PRN Reason: Systolic BP > 180


   Last Admin: 12/03/17 15:40 Dose:  10 mg


Isosorbide Mononitrate (Imdur)  60 mg PO DAILY [facility]


   Last Admin: 12/07/17 09:23 Dose:  60 mg


Loperamide HCl (Imodium)  2 mg PO PRN PRN


   PRN Reason: Diarrhea/Loose Stools


Minoxidil (Minoxidil)  2.5 mg PO DAILY [facility]


   Last Admin: 12/07/17 09:23 Dose:  2.5 mg


Ondansetron HCl (Zofran Odt)  4 mg PO Q6H PRN


   PRN Reason: Nausea/Vomiting


   Last Admin: 11/19/17 08:03 Dose:  4 mg


Ondansetron HCl (Zofran)  4 mg IVP Q6H PRN


   PRN Reason: Nausea/Vomiting


   Last Admin: 11/27/17 08:07 Dose:  4 mg


Polyethylene Glycol (Miralax)  17 gm PO BID PRN


   PRN Reason: Constipation


Senna (Senokot)  1 tab PO BID PRN


   PRN Reason: Constipation


Sodium Chloride (Ocean Nasal Spray 0.65%)  0 ml EA NARE QIDPRN PRN


   PRN Reason: Nasal Congestion


Sodium Chloride (Flush - Normal Saline)  10 ml IVF Q12HR [facility]


   Last Admin: 12/07/17 09:54 Dose:  10 ml


Sodium Chloride (Flush - Normal Saline)  10 ml IVF PRN PRN


   PRN Reason: Saline Flush


   Last Admin: 11/28/17 18:23 Dose:  10 ml

## 2017-12-07 NOTE — PRG
DATE OF SERVICE:  12/07/2017

 

SUBJECTIVE:  The patient remains very confused and largely dysarthric.  She seems worse to me than sh
jermaine was yesterday.

 

PHYSICAL EXAMINATION:

VITAL SIGNS:  Temperature is 97.8, pulse 77, respirations 18, O2 saturation 98%, blood pressure 120/4
5.

HEENT:  Remarkable for facial droop.

NECK:  No JVD.

CHEST:  Fairly clear without wheezing.

CARDIAC:  S1 and S2 regular.

ABDOMEN:  Soft.

EXTREMITIES:  No edema.

 

IMAGING:  Brain CT showed no acute abnormalities.

 

LABORATORY DATA:  White blood cell count 9.3, hematocrit 25.2, platelet count 200.  A pH 7.41, pCO2 o
f 42, pO2 80 on room air.  Sodium 146, potassium 5.1, chloride 114, CO2 28, BUN 50, creatinine 1.4 an
d glucose 146.

 

ASSESSMENT:

1.  Metabolic encephalopathy.

2.  Grossly elevated C-reactive protein.

 

RECOMMENDATIONS:  I would advise formal Neurology input on this case.

 

PLAN:  I think we should go ahead and stop the primidone.  Her prognosis appears to be quite poor.

## 2017-12-08 LAB
ANION GAP SERPL CALC-SCNC: 11 MMOL/L (ref 10–20)
BASOPHILS # BLD AUTO: 0.1 THOU/UL (ref 0–0.2)
BASOPHILS NFR BLD AUTO: 0.7 % (ref 0–1)
BUN SERPL-MCNC: 57 MG/DL (ref 9.8–20.1)
CALCIUM SERPL-MCNC: 9.4 MG/DL (ref 7.8–10.44)
CHLORIDE SERPL-SCNC: 113 MMOL/L (ref 98–107)
CO2 SERPL-SCNC: 26 MMOL/L (ref 23–31)
CREAT CL PREDICTED SERPL C-G-VRATE: 51 ML/MIN (ref 70–130)
EOSINOPHIL # BLD AUTO: 0.3 THOU/UL (ref 0–0.7)
EOSINOPHIL NFR BLD AUTO: 3.4 % (ref 0–10)
HCT VFR BLD CALC: 24 % (ref 36–47)
LYMPHOCYTES # BLD: 2 THOU/UL (ref 1.2–3.4)
LYMPHOCYTES NFR BLD AUTO: 23.4 % (ref 21–51)
MAGNESIUM SERPL-MCNC: 1.8 MG/DL (ref 1.6–2.6)
MONOCYTES # BLD AUTO: 0.6 THOU/UL (ref 0.11–0.59)
MONOCYTES NFR BLD AUTO: 7.1 % (ref 0–10)
NEUTROPHILS # BLD AUTO: 5.5 THOU/UL (ref 1.4–6.5)
RBC # BLD AUTO: 2.3 MILL/UL (ref 4.2–5.4)
WBC # BLD AUTO: 8.4 THOU/UL (ref 4.8–10.8)

## 2017-12-08 RX ADMIN — Medication SCH ML: at 10:22

## 2017-12-08 RX ADMIN — FAMOTIDINE SCH MG: 10 INJECTION, SOLUTION INTRAVENOUS at 10:21

## 2017-12-08 RX ADMIN — HEPARIN SODIUM SCH UNITS: 5000 INJECTION, SOLUTION INTRAVENOUS; SUBCUTANEOUS at 10:21

## 2017-12-08 RX ADMIN — HEPARIN SODIUM SCH UNITS: 5000 INJECTION, SOLUTION INTRAVENOUS; SUBCUTANEOUS at 21:32

## 2017-12-08 RX ADMIN — Medication SCH ML: at 21:42

## 2017-12-08 RX ADMIN — CYANOCOBALAMIN TAB 1000 MCG SCH MCG: 1000 TAB at 10:20

## 2017-12-08 RX ADMIN — HEPARIN SODIUM SCH UNITS: 5000 INJECTION, SOLUTION INTRAVENOUS; SUBCUTANEOUS at 17:07

## 2017-12-08 NOTE — PRG
DATE OF SERVICE:  12/06/2017

 

SUBJECTIVE:  Ms. Leach is a pleasant 83-year-old  female who has been asked to be reevaluat
ed as she was not having any improvement in her mentation.  According to the nurses, the patient has 
periods of confusion.  She is able to recognize her family members, but seems to be confused on and o
ff.  She is also noted to have some weakness in her right arm.  I am being asked to further evaluate 
why her mentation is not improving.  Daughter was at bedside and states that her mentation is unchang
ed over the past few days.

 

OBJECTIVE:

VITAL SIGNS:  Blood pressure of 121/41, pulse of 75, temperature of 97, respirations of 20 and O2 sat
s are 96% on room air.

GENERAL:  A well-developed, well-nourished -American female in no apparent distress.

RESPIRATORY:  Clear to auscultation bilaterally.

CARDIOVASCULAR:  Regular rate and rhythm.

NEUROLOGICAL:  Mental status:  The patient is awake and alert.  She is oriented to person.  She is ab
le to follow some simple commands.  Speech and language appears unchanged from previously.  Cranial n
erves:  Pupils are 3 mm and reactive.  Visual fields are full to threat.  Extraocular muscles are int
act.  No nystagmus.  Face appears symmetric.  Tongue and uvula are midline.  Motor exam showed somewh
at guarding of the right upper extremity, and strength in the right upper extremity appears to be maxx
ghtly diminished, more proximally than  distally.  Strength in the left lower extremity is 5/5.  Sens
ory:  Sensation is intact and symmetric.

 

LABORATORY DATA:  Labs have been reviewed, which included CBC; BMP which is significant for white blo
od cell count of 12.1, hemoglobin 7.8, hematocrit 24.4, BUN of 46, creatinine of 1.32 and glucose of 
152, otherwise unremarkable.

 

IMAGING STUDIES:  Recent CT head without contrast done today was reviewed, which showed no acute intr
acranial abnormality.  She had a repeat MRI of the brain on 11/27/2017, which showed no evidence of a
cute intracranial abnormality.

 

IMPRESSION:  Altered mental status. likely toxic metabolic encephalopathy.

 

PLAN:  Ms. Leach is an pleasant 83-year-old  female who presented with the changes in menta
tion. _____ abnormality.  At this time, I will recommend obtaining an EEG for further evaluation ____
_ .  I suspect this is more of a guarding rather than a weakness.  If there is a concern for a stroke
, I would recommend obtaining an MRI and continuing current medical management.  No further neurologi
c workup needed from our standpoint.

## 2017-12-08 NOTE — PDOC.PN
- Subjective


Encounter Start Date: 12/08/17


Encounter Start Time: 09:00





Patient seen and examined. Mentation improving. No overnight events





- Objective


MAR Reviewed: Yes


Vital Signs & Weight: 


 Vital Signs (12 hours)











  Temp Pulse Resp BP Pulse Ox


 


 12/08/17 19:00  98.4 F  73  16  138/55 L  99


 


 12/08/17 15:00  98.8 F  79  18  136/48 L  99


 


 12/08/17 11:00  98.5 F  71  16  150/50 H  96


 


 12/08/17 10:20   74   


 


 12/08/17 08:22   74  18   99








 Weight











Admit Weight                   250 lb


 


Weight                         316 lb 4.8 oz














I&O: 


 











 12/07/17 12/08/17 12/09/17





 06:59 06:59 06:59


 


Intake Total 1600 1340 1290


 


Output Total 600 525 575


 


Balance 1000 815 715











Result Diagrams: 


 12/08/17 05:27





 12/08/17 05:27


EKG Reviewed by me: Yes (Tele SR)





Phys Exam





- Physical Examination


Constitutional: NAD


Arousable on verbal stimuli, Alert and Awake


Respiratory: no wheezing, no rhonchi


Cardiovascular: RRR, no rub


Gastrointestinal: soft, positive bowel sounds


Neurological: moves all 4 limbs





Dx/Plan





- Plan


DVT proph w/heparin, DVT proph w/SCDs





IMPRESSION:


1. Toxic Metabolic Encephalopathy - multifactorial, slowly improvement


2. CAPRI/CKD 3 - improving


3. Enterococcus  - Catheter associated UTI - completed Atbx


4. Hypotension - prob due to hypovolemia - improved


5. HTN with Hypertensive urgency - improving


6. TIA vs ?CVA


7. Swallow dysfunction


8. Vit B12 def - on replacement


9. Hyponatremia/Hyperkalemia -resolved





PLAN:


* Primidone dced


* Cont to monitor


* Cont DHT feeding/SLP following


* AM labs


* Cont current meds as below








Review of Systems





- Review of Systems


Other: 





Cannot obtain due to current mngt





- Medications/Allergies


Allergies/Adverse Reactions: 


 Allergies











Allergy/AdvReac Type Severity Reaction Status Date / Time


 


iodine Allergy Severe Hives Verified 01/13/14 00:20


 


zoster vaccine live Allergy Severe Anaphylaxis Verified 01/13/14 00:20





[From ZOSTAVAX (PF)]     











Medications: 


 Current Medications





Acetaminophen (Tylenol)  650 mg PO Q4H PRN


   PRN Reason: Headache/Fever or Pain


   Last Admin: 12/05/17 13:13 Dose:  650 mg


Al Hydroxide/Mg Hydroxide (Maalox)  30 ml PO Q6H PRN


   PRN Reason: Heartburn  or Indigestion


Albuterol Sulfate (Albuterol Sulfate)  1.25 mg NEB Q8H PRN


   PRN Reason:  SOB


   Last Admin: 12/01/17 18:41 Dose:  1.25 mg


Allopurinol (Zyloprim)  100 mg PO DAILY [facility]


   Last Admin: 12/08/17 10:20 Dose:  100 mg


Amlodipine Besylate (Norvasc)  5 mg PO DAILY [facility]


   Last Admin: 12/08/17 10:20 Dose:  5 mg


Aspirin (Ecotrin)  325 mg PO DAILY [facility]


   Last Admin: 12/08/17 10:20 Dose:  325 mg


Atorvastatin Calcium (Lipitor)  10 mg PO HS [facility]


   Last Admin: 12/07/17 20:27 Dose:  10 mg


Carvedilol (Coreg)  25 mg PO BID-[facility]


   Last Admin: 12/08/17 17:08 Dose:  25 mg


Cyanocobalamin (Vitamin B-12)  1,000 mcg PO DAILY [facility]


   Last Admin: 12/08/17 10:20 Dose:  1,000 mcg


Docusate Sodium (Colace)  100 mg PO BID [facility]


   Last Admin: 12/08/17 10:20 Dose:  100 mg


Famotidine (Pepcid)  20 mg SLOW IVP DAILY [facility]


   Last Admin: 12/08/17 10:21 Dose:  20 mg


Folic Acid (Folvite)  1 mg PO DAILY [facility]


   Last Admin: 12/08/17 10:21 Dose:  1 mg


Guaifenesin (Robitussin Sf)  200 mg PO Q4H PRN


   PRN Reason: Cough


   Last Admin: 11/17/17 20:47 Dose:  200 mg


Heparin Sodium (Porcine) (Heparin)  5,000 units SC TID [facility]


   Last Admin: 12/08/17 17:07 Dose:  5,000 units


Hydralazine HCl (Apresoline)  10 mg SLOW IVP Q4H PRN


   PRN Reason: Systolic BP > 180


   Last Admin: 12/03/17 15:40 Dose:  10 mg


Isosorbide Mononitrate (Imdur)  60 mg PO DAILY [facility]


   Last Admin: 12/08/17 10:21 Dose:  60 mg


Loperamide HCl (Imodium)  2 mg PO PRN PRN


   PRN Reason: Diarrhea/Loose Stools


Minoxidil (Minoxidil)  2.5 mg PO DAILY [facility]


   Last Admin: 12/08/17 10:21 Dose:  2.5 mg


Ondansetron HCl (Zofran Odt)  4 mg PO Q6H PRN


   PRN Reason: Nausea/Vomiting


   Last Admin: 11/19/17 08:03 Dose:  4 mg


Ondansetron HCl (Zofran)  4 mg IVP Q6H PRN


   PRN Reason: Nausea/Vomiting


   Last Admin: 11/27/17 08:07 Dose:  4 mg


Polyethylene Glycol (Miralax)  17 gm PO BID PRN


   PRN Reason: Constipation


Senna (Senokot)  1 tab PO BID PRN


   PRN Reason: Constipation


Sodium Chloride (Ocean Nasal Spray 0.65%)  0 ml EA NARE QIDPRN PRN


   PRN Reason: Nasal Congestion


Sodium Chloride (Flush - Normal Saline)  10 ml IVF Q12HR [facility]


   Last Admin: 12/08/17 10:22 Dose:  10 ml


Sodium Chloride (Flush - Normal Saline)  10 ml IVF PRN PRN


   PRN Reason: Saline Flush


   Last Admin: 11/28/17 18:23 Dose:  10 ml

## 2017-12-08 NOTE — PRG
DATE OF SERVICE:  12/08/2017

 

PULMONARY AND CRITICAL CARE PROGRESS NOTE

 

SUBJECTIVE:  She continues to demonstrate altered mental status.  She will wake up, but is very repet
itive than what she says.  She was reevaluated by Neurology yesterday with no specific recommendation
s on their part.

 

PHYSICAL EXAMINATION:

VITAL SIGNS:  On exam, her temperature is 98.2, pulse 74, respirations 18, O2 sat 99%, blood pressure
 117/46.

HEENT:  Unremarkable except for NG tube in place.

NECK:  No JVD.

LUNGS:  Clear.

CARDIAC:  S1 and S2 regular.

ABDOMEN:  Soft.

EXTREMITIES:  Trace edema.

 

LABORATORY DATA:  White blood cell count 8.4, hematocrit 24.0, and platelet count 198.  Sodium 145, p
otassium 5, chloride 113, CO2 26, BUN 57, creatinine 1.6, and glucose 131.  C-reactive protein is 23.


 

ASSESSMENT:

1.  Metabolic encephalopathy.

2.  Underlying obstructive sleep apnea - currently receiving bilevel positive airway pressure at [facility].

3.  Tremor - Mysoline was stopped yesterday with the assumption that it could be causing mental statu
s changes.

 

PLAN:

1.  I think we should reculture her as surveillance for sepsis and consider starting antibiotics if a
nything is seen on cultures.

2.  Continue BiPAP at night.

3.  Discussed with family at bedside.

## 2017-12-08 NOTE — RAD
MODIFIED BARIUM SWALLOW:

 

INDICATIONS:

Dysphagia, oropharyngeal phase (R13.12), with feeding difficulties (R63.3).

 

FLUOROSCOPIC TIME:

2.8 minutes with a total exposure of 90.7 mGy per M2.

 

TECHNIQUE:

Real-time video fluoroscopic examination was performed in conjunction with the speech therapy departm
ent.  Please see their report for full details concerning the material administered.

 

FINDINGS:

The patient had no episodes of tracheal aspiration with any of the barium impregnated materials.  The
 patient did have episodes of tracheal penetration with thin barium liquids only.  There was prematur
e spill with multiple different consistencies, to the level of the vallecula and piriform sinuses.  T
he patient had a feeding tube in place.  There is prominent disk degenerative disease at C5-C6 that c
auses some mild posterior indentation of the upper esophagus.

 

IMPRESSION:

Episodes of tracheal penetration only with thin barium liquids.

 

Please see speech therapy dictation for further details concerning the examination.

 

POS: RENAE

## 2017-12-09 LAB
ANION GAP SERPL CALC-SCNC: 9 MMOL/L (ref 10–20)
BASOPHILS # BLD AUTO: 0.1 THOU/UL (ref 0–0.2)
BASOPHILS NFR BLD AUTO: 0.7 % (ref 0–1)
BUN SERPL-MCNC: 58 MG/DL (ref 9.8–20.1)
CALCIUM SERPL-MCNC: 9.8 MG/DL (ref 7.8–10.44)
CHLORIDE SERPL-SCNC: 113 MMOL/L (ref 98–107)
CO2 SERPL-SCNC: 28 MMOL/L (ref 23–31)
CREAT CL PREDICTED SERPL C-G-VRATE: 65 ML/MIN (ref 70–130)
EOSINOPHIL # BLD AUTO: 0.4 THOU/UL (ref 0–0.7)
EOSINOPHIL NFR BLD AUTO: 5.4 % (ref 0–10)
HCT VFR BLD CALC: 25.7 % (ref 36–47)
LYMPHOCYTES # BLD: 1.9 THOU/UL (ref 1.2–3.4)
LYMPHOCYTES NFR BLD AUTO: 25.8 % (ref 21–51)
MONOCYTES # BLD AUTO: 0.5 THOU/UL (ref 0.11–0.59)
MONOCYTES NFR BLD AUTO: 7.2 % (ref 0–10)
NEUTROPHILS # BLD AUTO: 4.4 THOU/UL (ref 1.4–6.5)
RBC # BLD AUTO: 2.47 MILL/UL (ref 4.2–5.4)
WBC # BLD AUTO: 7.2 THOU/UL (ref 4.8–10.8)

## 2017-12-09 RX ADMIN — HEPARIN SODIUM SCH UNITS: 5000 INJECTION, SOLUTION INTRAVENOUS; SUBCUTANEOUS at 20:28

## 2017-12-09 RX ADMIN — HEPARIN SODIUM SCH UNITS: 5000 INJECTION, SOLUTION INTRAVENOUS; SUBCUTANEOUS at 16:04

## 2017-12-09 RX ADMIN — HEPARIN SODIUM SCH UNITS: 5000 INJECTION, SOLUTION INTRAVENOUS; SUBCUTANEOUS at 10:06

## 2017-12-09 RX ADMIN — Medication SCH ML: at 10:07

## 2017-12-09 RX ADMIN — CYANOCOBALAMIN TAB 1000 MCG SCH MCG: 1000 TAB at 10:05

## 2017-12-09 RX ADMIN — FAMOTIDINE SCH MG: 10 INJECTION, SOLUTION INTRAVENOUS at 10:05

## 2017-12-09 RX ADMIN — Medication SCH ML: at 20:29

## 2017-12-09 NOTE — PRG
DATE OF SERVICE:  12/09/2017

 

SERVICE:  Pulmonary Medicine.

 

INTERVAL HISTORY:  The patient is doing great from a respiratory standpoint.  She is on room air.  Richelle dean is breathing comfortably.  She is little bit somnolent today.  Otherwise, there has been no interva
l change in her condition.  She tolerating tube feeds is fine.

 

PHYSICAL EXAMINATION:

VITAL SIGNS:  Afebrile, pulse 77, blood pressure 133/44, respirations 20, and saturation 97% on room 
air.

GENERAL:  Patient is somnolent.  That being said, she answers all questions appropriately.  She reall
y does not open her eyes though.

HEENT:  Normocephalic, atraumatic.  Sclerae are white, conjunctivae pink.  Oral and nasal mucosa is m
oist without lesions.

LUNGS:  Excellent air entry.  There is no prolonged expiratory phase or wheezing.

HEART:  Normal rate, regular.

ABDOMEN:  Soft, nontender, nondistended.  Bowel sounds positive.

MUSCULOSKELETAL:  No cyanosis or clubbing.  There is diffuse 2+ pitting throughout.  It is more prono
unced in the sacral region.

 

LABORATORY DATA:  WBC 7.2, hemoglobin 8.0, platelets 208,000.  Sodium 145, potassium 5.2, chloride 11
3.  Creatinine 1.49 and is roughly stable.  Basic metabolic profile is otherwise unremarkable.  Urine
 culture is growing Enterococcus species, which is fairly sensitive to most antibiotics.  Blood cultu
res are positive for coag negative Staph.  Urine culture repeated is negative.

 

IMAGING:  Modified barium swallow demonstrates a tracheal penetration with thin barium liquids with n
o overt aspiration identified.

 

ASSESSMENT:

1.  Metabolic encephalopathy.

2.  Obstructive sleep apnea, requiring noninvasive ventilation at night.

3.  Tremor, resolved.

 

PLAN:  We will continue supportive care with antibiotics, nebulized medications, noninvasive ventilat
ion, and tube feeds.  I will provide her with slightly increasing rates of free water flushes.  We wi
ll also give daily dose of Lasix to see if we can compulsive this fluid off.  She will remain in this
 part of the hospital for the time being.

## 2017-12-09 NOTE — PRG
DATE OF SERVICE:  12/09/2017

 

SUBJECTIVE:  The patient was seen and examined at bedside.  She is wearing the CPAP mask at night wit
h no _____ events overnight.

 

OBJECTIVE:

VITAL SIGNS:  Blood pressure is 132/40, respiratory rate is 24, O2 saturation is 99.  Temperature is 
98.8, pulse oximetry is 79.

HEENT:  Atraumatic, normocephalic.  Eyes:  Pupils are responding to light properly.

NECK:  Supple.

LUNGS:  Clear.

HEART:  S1, S2 normal, no S3, no S4.

ABDOMEN:  Soft.  Bowel sounds are present.

NEUROLOGIC:  She is able to move her all 4 extremities.  She follows my commands, but she is quite dr
owsy.  She falls asleep quickly.  She has a feeding tube in place and she is getting formula 40 mL pe
r hour.

 

LABORATORY DATA:  White count of 7.2, hemoglobin 8.0, hematocrit 25.7, platelet count is 208.  Chemis
try shows sodium of 145, potassium 5.2, chloride 113, CO2 of 28, BUN 58, creatinine 1.49.  Glycemia i
s ranging from 124-152, calcium 9.8.  Microbiology grows negative coagulase Staphylococcus in out of 
two blood cultures and another blood culture did not show any growth as a preliminary.

 

IMPRESSION:

1.  Metabolic encephalopathy.

2.  Acute kidney injury/chronic kidney disease III, improving

3.  Enterococcal urinary tract infection, course of antibiotics completed.

4.  Hypovolemia, improved.

5.  Hypertensive urgency prior to that.

6.  Swallow dysfunction.

7.  Vitamin B12 deficiency, on replacement.

8.  Elevated CRP, rule out inflammatory processes.

9.  Drowsiness.  Her primidone was stopped.

 

PLAN:  Start her on IV Lasix 40 mg IV push x1 daily.  This was ordered by Dr. Matthews since she gaine
d approximately 70 pounds from the time of admission and she shows some swelling in the upper and low
er extremities since she is not able to swallow and eat appropriately and enough she is getting feedi
ng through the tube at 40 mL per hour.  We are going to continue that.  We will obtain antinuclear an
tibodies to rule out lupus and also  is working on LTAC transfer.  The patient was seen b
y neurologist who is not able to see any focal issues in central nervous system and the encephalopath
y is most likely related to metabolic changes.  She should improve gradually with time.  We will cont
inue DVT prophylaxis with heparin 5000 units 3 times a day.  We will continue PT and OT.  We will con
tinue her close observation.  She had 1 out of 2 blood cultures positive, but that is most likely a c
ontaminant.

## 2017-12-10 LAB
ANION GAP SERPL CALC-SCNC: 9 MMOL/L (ref 10–20)
BUN SERPL-MCNC: 59 MG/DL (ref 9.8–20.1)
CALCIUM SERPL-MCNC: 9.5 MG/DL (ref 7.8–10.44)
CHLORIDE SERPL-SCNC: 112 MMOL/L (ref 98–107)
CO2 SERPL-SCNC: 29 MMOL/L (ref 23–31)
CREAT CL PREDICTED SERPL C-G-VRATE: 68 ML/MIN (ref 70–130)
MAGNESIUM SERPL-MCNC: 1.5 MG/DL (ref 1.6–2.6)

## 2017-12-10 RX ADMIN — Medication SCH ML: at 09:38

## 2017-12-10 RX ADMIN — FAMOTIDINE SCH MG: 10 INJECTION, SOLUTION INTRAVENOUS at 09:37

## 2017-12-10 RX ADMIN — HEPARIN SODIUM SCH UNITS: 5000 INJECTION, SOLUTION INTRAVENOUS; SUBCUTANEOUS at 15:56

## 2017-12-10 RX ADMIN — HEPARIN SODIUM SCH UNITS: 5000 INJECTION, SOLUTION INTRAVENOUS; SUBCUTANEOUS at 09:38

## 2017-12-10 RX ADMIN — CYANOCOBALAMIN TAB 1000 MCG SCH MCG: 1000 TAB at 09:37

## 2017-12-10 RX ADMIN — HEPARIN SODIUM SCH UNITS: 5000 INJECTION, SOLUTION INTRAVENOUS; SUBCUTANEOUS at 21:01

## 2017-12-10 RX ADMIN — Medication SCH ML: at 21:02

## 2017-12-10 NOTE — PRG
DATE OF SERVICE:  12/10/2017

 

SERVICE:  Pulmonary Medicine.

 

INTERVAL HISTORY:  The patient is doing really well from a respiratory perspective.  She is on room a
ir.  She has no specific complaints of nausea, vomiting, or diarrhea.  Otherwise, she is in her usual
 state of health.  She is breathing comfortably.  I find her in a very pleasant mood this evening.

 

PHYSICAL EXAMINATION:

VITAL SIGNS:  Afebrile, pulse 83, blood pressure 140/52, respirations 20, and saturation 97% on room 
air.

GENERAL:  The patient is awake and alert, in no apparent distress.

LUNGS:  Excellent air entry.  Dependent crackles are minimal.

HEART:  Normal rate, regular.

ABDOMEN:  Soft, nontender, nondistended.  Bowel sounds positive.

MUSCULOSKELETAL:  No cyanosis or clubbing.  A 1+ to 2+ pitting in the bilateral lower extremities.

NEUROLOGIC:  Grossly nonfocal.

 

LABORATORY DATA:  Chloride 112.  Creatinine 1.43 and stable.  BUN is also stable.  Basic metabolic pr
ofile is otherwise unremarkable.  Magnesium 1.5.  Urine culture is growing Enterococcus species.  One
 out of two blood cultures has coag negative staph.  Urine culture is negative.

 

ASSESSMENT:

1.  Metabolic encephalopathy, resolved.

2.  Obstructive sleep apnea, require noninvasive ventilation.

3.  Tremor, resolved.

4.  Slight volume overload.

 

PLAN:  I will provide her with a single dose of Lasix.  We will continue her free water flushes.  Fro
m a purely respiratory perspective, she is stable for transition to the floor.  That being said, Nelson hernandez will continue to follow while she remains in this location.

## 2017-12-11 LAB
ANION GAP SERPL CALC-SCNC: 10 MMOL/L (ref 10–20)
BUN SERPL-MCNC: 56 MG/DL (ref 9.8–20.1)
CALCIUM SERPL-MCNC: 9.4 MG/DL (ref 7.8–10.44)
CHLORIDE SERPL-SCNC: 110 MMOL/L (ref 98–107)
CO2 SERPL-SCNC: 29 MMOL/L (ref 23–31)
CREAT CL PREDICTED SERPL C-G-VRATE: 72 ML/MIN (ref 70–130)
MAGNESIUM SERPL-MCNC: 1.6 MG/DL (ref 1.6–2.6)

## 2017-12-11 RX ADMIN — HEPARIN SODIUM SCH UNITS: 5000 INJECTION, SOLUTION INTRAVENOUS; SUBCUTANEOUS at 20:51

## 2017-12-11 RX ADMIN — HEPARIN SODIUM SCH UNITS: 5000 INJECTION, SOLUTION INTRAVENOUS; SUBCUTANEOUS at 15:07

## 2017-12-11 RX ADMIN — FAMOTIDINE SCH MG: 10 INJECTION, SOLUTION INTRAVENOUS at 09:00

## 2017-12-11 RX ADMIN — Medication PRN ML: at 05:02

## 2017-12-11 RX ADMIN — Medication SCH ML: at 09:00

## 2017-12-11 RX ADMIN — Medication SCH ML: at 20:53

## 2017-12-11 RX ADMIN — HEPARIN SODIUM SCH UNITS: 5000 INJECTION, SOLUTION INTRAVENOUS; SUBCUTANEOUS at 09:00

## 2017-12-11 RX ADMIN — CYANOCOBALAMIN TAB 1000 MCG SCH MCG: 1000 TAB at 09:00

## 2017-12-11 NOTE — PDOC.PN
- Subjective


Encounter Start Date: 12/11/17


Encounter Start Time: 13:15


-: non-verbal, old records requested/rev





Pt seen and examined, chart reviewed in its entirety.  This is my first visit 

with this patient.





Pt admitted with stroke symptoms, has been here for 25 days/  transferred to 

the floor today.  Daughter NOT MPOA at bedside and updated.  Pt sleeping soundly

, not awaked.





ROS not performed, pt only Ox1/.





no acute events overnight.





- Objective


Resuscitation Status: 





FULL





MAR Reviewed: Yes


Vital Signs & Weight: 


 Vital Signs (12 hours)











  Temp Pulse Resp BP Pulse Ox


 


 12/11/17 11:22  98.4 F  73  16  124/59 L  97


 


 12/11/17 10:00  99.4 F  73  18   96


 


 12/11/17 09:00   79   


 


 12/11/17 07:14      100


 


 12/11/17 07:12  98.3 F  79  20  156/55 H  99


 


 12/11/17 06:00   80   154/51 H 


 


 12/11/17 04:00  98.9 F  67  18  107/45 L  96








 Weight











Admit Weight                   250 lb


 


Weight                         319 lb 8.008 oz














I&O: 


 











 12/10/17 12/11/17 12/12/17





 06:59 06:59 06:59


 


Intake Total 2420 3150 100


 


Output Total 1980 1225 


 


Balance 440 1925 100











Result Diagrams: 


 12/09/17 05:23





 12/11/17 05:00


Radiology Reviewed by me: Yes


EKG Reviewed by me: Yes





Phys Exam





- Physical Examination


Constitutional: NAD


HEENT: PERRLA, moist MMs, sclera anicteric, oral pharynx no lesions


Neck: no nodes, no JVD, supple, full ROM


Respiratory: no wheezing, no rales, no rhonchi, clear to auscultation bilateral


Cardiovascular: RRR, no significant murmur, no rub


Gastrointestinal: soft, non-tender, no distention, positive bowel sounds


Musculoskeletal: no edema, pulses present


not tested


Lymphatic: no nodes


Skin: no rash, normal turgor, cap refill <2 seconds





Dx/Plan


(1) Metabolic encephalopathy


Code(s): G93.41 - METABOLIC ENCEPHALOPATHY   Status: Acute   Comment: Ox1 

earlier.  slow to improve   





(2) CKD (chronic kidney disease) stage 3, GFR 30-59 ml/min


Code(s): N18.3 - CHRONIC KIDNEY DISEASE, STAGE 3 (MODERATE)   Status: Chronic   





(3) Oropharyngeal dysphagia


Code(s): R13.12 - DYSPHAGIA, OROPHARYNGEAL PHASE   Status: Acute   Comment: TF 

at goal.  eating some, daughter reports no choking, but did cough after meal   





(4) Acute kidney injury


Code(s): N17.9 - ACUTE KIDNEY FAILURE, UNSPECIFIED   Status: Acute   Comment: 

AM labs   





(5) Hyperkalemia


Code(s): E87.5 - HYPERKALEMIA   Status: Resolved   Comment: resolved..   





(6) Physical deconditioning


Code(s): R53.81 - OTHER MALAISE   Status: Acute   Comment: to rehab when more 

stable..   





(7) Dyslipidemia


Code(s): E78.5 - HYPERLIPIDEMIA, UNSPECIFIED   Status: Chronic   





(8) HTN (hypertension)


Code(s): I10 - ESSENTIAL (PRIMARY) HYPERTENSION   Status: Chronic   


Qualifiers: 


   Hypertension type: essential hypertension   Qualified Code(s): I10 - 

Essential (primary) hypertension   


Comment: better controlled   





(9) Obesity


Code(s): E66.9 - OBESITY, UNSPECIFIED   Status: Chronic   


Qualifiers: 


   Obesity classification: adult class 3 (BMI >= 40)   Body mass index: BMI 40.0

-44.9 





(10) TIA (transient ischemic attack)


Status: Resolved   Comment: Likely hypertensive emergency causing the neuro 

symptoms, repeat MRI negative for stroke   





- Plan


cont current plan of care, plan discussed w/ family, PT/OT, , 

speech therapy


to Rehab when arranged, family and CM working on it





* .

## 2017-12-11 NOTE — PRG
DATE OF SERVICE:  12/11/2017

 

SUBJECTIVE:  She is doing much better.  She is mentating and able to tell me where she is and the pasquale
e.

 

PHYSICAL EXAMINATION:

VITAL SIGNS:  Temperature is 98.3, pulse 79, respiration 20, O2 sat 99%, blood pressure 156/55.

HEENT:  Unremarkable.

NECK:  No JVD.

CHEST:  Clear to auscultation.

CARDIAC:  S1 and S2 regular.

ABDOMEN:  Soft.

EXTREMITIES:  No edema.

 

LABORATORY DATA:  Sodium 144, potassium 4.5, chloride 110, CO2 29, BUN 56, creatinine 1.3, and glucos
e 118.

 

ASSESSMENT:

1.  Metabolic encephalopathy, probably made worse by Mysoline.

2.  Hypertension.

3.  Obstructive sleep apnea.

 

PLAN:

1.  Transfer to the medical floor.  Increase activity as tolerated.

2.  Continue to use BiPAP at night for ISIS.

3.  Consider skilled nursing placement.

## 2017-12-12 LAB
ANION GAP SERPL CALC-SCNC: 9 MMOL/L (ref 10–20)
BASOPHILS # BLD AUTO: 0 THOU/UL (ref 0–0.2)
BASOPHILS NFR BLD AUTO: 0.2 % (ref 0–1)
BUN SERPL-MCNC: 51 MG/DL (ref 9.8–20.1)
C4 SERPL-MCNC: 41 MG/DL (ref 14–44)
CALCIUM SERPL-MCNC: 9.2 MG/DL (ref 7.8–10.44)
CHLORIDE SERPL-SCNC: 110 MMOL/L (ref 98–107)
CO2 SERPL-SCNC: 29 MMOL/L (ref 23–31)
CREAT CL PREDICTED SERPL C-G-VRATE: 76 ML/MIN (ref 70–130)
ENA RNP AB SER-ACNC: <0.2 AI (ref 0–0.9)
ENA SCL70 AB SER-ACNC: <0.2 AI (ref 0–0.9)
ENA SM AB SER-ACNC: <0.2 AI (ref 0–0.9)
EOSINOPHIL # BLD AUTO: 0.4 THOU/UL (ref 0–0.7)
EOSINOPHIL NFR BLD AUTO: 5 % (ref 0–10)
HCT VFR BLD CALC: 23.2 % (ref 36–47)
LYMPHOCYTES # BLD: 2.1 THOU/UL (ref 1.2–3.4)
LYMPHOCYTES NFR BLD AUTO: 24.7 % (ref 21–51)
MAGNESIUM SERPL-MCNC: 1.4 MG/DL (ref 1.6–2.6)
MITOCHONDRIA M2 IGG SER-ACNC: 11.2 UNITS (ref 0–20)
MONOCYTES # BLD AUTO: 0.6 THOU/UL (ref 0.11–0.59)
MONOCYTES NFR BLD AUTO: 7.1 % (ref 0–10)
NEUTROPHILS # BLD AUTO: 5.3 THOU/UL (ref 1.4–6.5)
PCA AB SER-ACNC: 14.3 UNITS (ref 0–20)
RBC # BLD AUTO: 2.23 MILL/UL (ref 4.2–5.4)
SMOOTH MUSCLE AB TITR SER: 11 UNITS (ref 0–19)
THYROPEROXIDASE AB SERPL-ACNC: 16 IU/ML (ref 0–34)
WBC # BLD AUTO: 8.4 THOU/UL (ref 4.8–10.8)

## 2017-12-12 PROCEDURE — 5A09357 ASSISTANCE WITH RESPIRATORY VENTILATION, LESS THAN 24 CONSECUTIVE HOURS, CONTINUOUS POSITIVE AIRWAY PRESSURE: ICD-10-PCS | Performed by: INTERNAL MEDICINE

## 2017-12-12 RX ADMIN — Medication SCH ML: at 07:36

## 2017-12-12 RX ADMIN — HEPARIN SODIUM SCH UNITS: 5000 INJECTION, SOLUTION INTRAVENOUS; SUBCUTANEOUS at 20:17

## 2017-12-12 RX ADMIN — CYANOCOBALAMIN TAB 1000 MCG SCH MCG: 1000 TAB at 07:32

## 2017-12-12 RX ADMIN — HEPARIN SODIUM SCH UNITS: 5000 INJECTION, SOLUTION INTRAVENOUS; SUBCUTANEOUS at 07:34

## 2017-12-12 RX ADMIN — Medication SCH ML: at 20:31

## 2017-12-12 RX ADMIN — HEPARIN SODIUM SCH UNITS: 5000 INJECTION, SOLUTION INTRAVENOUS; SUBCUTANEOUS at 16:23

## 2017-12-12 RX ADMIN — FAMOTIDINE SCH MG: 10 INJECTION, SOLUTION INTRAVENOUS at 07:33

## 2017-12-12 NOTE — EEG
*******************************************************************************
********************************************************************************
*****

Referring Physician: Dr. Isabel Leiva   

 *******************************************************************************
********************************************************************************
*****

EEG # 

TEST TYPE: ROUTINE PORTABLE INPATIENT

REASON FOR EEG:  ALTERED MENTAL STATUS



EEG DESCRIPTION:



This is a 21 channel digital EEG recording.  Electrodes are placed according to 
the 10-20 international electrode placement system.



The background rhythm is predominately 5-6, low to medium voltage theta rhythm.
  There is also 2-3 hertz intermittent, diffuse, low voltage, delta rhythm.  
There are no epileptiform discharges, sharp transients or asymmetry noted.



HYPERVENTILATION:  Could not be done.

PHOTIC STIMULATION:  Showed no affect.



EKG LEAD:  Shows 78 beats per minute, regular rhythm.  







IMPRESSION:



THIS IS AN ABNORMAL EEG.  IT SHOWS MILD, NONSPECIFIC CEREBRAL DYSFUNCTION.  
THERE ARE NO EPILEPTIFORM DISCHARGES, SHARP TRANSIENTS OR ASYMMETRY NOTED,  
THIS COULD BE SEEN IN PATIENTS WITH TOXIC METABOLIC

ENCEPHALOPATHY.  THIS CORRELATED CLINICALLY.









Technician: CRISTINA

: EEG.[email or web address] MURPHY

## 2017-12-12 NOTE — PRG
DATE OF SERVICE:  12/12/2017 

 

Ms. Leach is awake and alert.  She speaks freely.

 

PHYSICAL EXAMINATION: 

VITAL SIGNS:  Temperature is 98.7, pulse 78, respirations 18, O2 sat 96%, blood pressure 143/80.

HEENT:  Unremarkable.

NECK:  No JVD.

CHEST:  Clear.

CARDIAC:  S1 and S2 regular.

ABDOMEN:  Soft.

EXTREMITIES:  No edema.

 

LABORATORY DATA:  White blood cell count 8.4, hematocrit 23.2, platelet count 237.  Sodium 144, potas
sium 4.4, chloride 110, CO2 29, BUN 51, creatinine 1.2, glucose 103.

 

ASSESSMENT:  

1.  Metabolic encephalopathy, which has improved since discontinuation of Mysoline.

2.  Hypertension.

3.  Obstructive sleep apnea.

 

PLAN:  

1.  Continue CPAP at night.  

2.  Consider retesting swallowing.

3.  Seems to be more or less a rehab issue at this point.

4.  Hopefully home soon.

## 2017-12-12 NOTE — PDOC.PN
- Subjective


Encounter Start Date: 12/12/17


Encounter Start Time: 10:00





Pt more awake and alert, talking answering question appropriately.  A&O X 3.





denies f/C, no CP or SOb, no N/V/d/C.  Wants water and Coffee.   at 

bedside, upated to condition.





weakness better, speech improving





Pt denies coughing or choking when  givers a sip of water.


10 point ROs performed and neg for all except for HPI





- Objective


Resuscitation Status: 





full





MAR Reviewed: Yes


Vital Signs & Weight: 


 Vital Signs (12 hours)











  Temp Pulse Resp BP BP Pulse Ox


 


 12/12/17 08:00  98.7 F  80  16   145/70 H  95


 


 12/12/17 07:32   80   145/70 H  


 


 12/12/17 04:00  98.7 F  78  18   143/80 H  96








 Weight











Admit Weight                   250 lb


 


Weight                         320 lb














I&O: 


 











 12/11/17 12/12/17 12/13/17





 06:59 06:59 06:59


 


Intake Total 3150 2440 400


 


Output Total 1225 2100 


 


Balance 1925 340 400











Result Diagrams: 


 12/12/17 05:45





 12/12/17 05:45


Radiology Reviewed by me: Yes


EKG Reviewed by me: No





Phys Exam





- Physical Examination


Constitutional: NAD


HEENT: PERRLA, moist MMs, sclera anicteric, oral pharynx no lesions


Neck: no nodes, no JVD, supple, full ROM


Respiratory: no wheezing, no rales, no rhonchi, clear to auscultation bilateral


Cardiovascular: RRR, no significant murmur, no rub


Gastrointestinal: soft, non-tender, no distention, positive bowel sounds


Musculoskeletal: pulses present, edema present


Neurological: non-focal, normal sensation, moves all 4 limbs


global weakness 4-5/5


Lymphatic: no nodes


Psychiatric: normal affect, A&O x 3


Skin: no rash, normal turgor, cap refill <2 seconds





Dx/Plan


(1) Metabolic encephalopathy


Code(s): G93.41 - METABOLIC ENCEPHALOPATHY   Status: Resolved   Comment: Ox3 

earlier.  slow to improve   





(2) CKD (chronic kidney disease) stage 3, GFR 30-59 ml/min


Code(s): N18.3 - CHRONIC KIDNEY DISEASE, STAGE 3 (MODERATE)   Status: Chronic   





(3) Oropharyngeal dysphagia


Code(s): R13.12 - DYSPHAGIA, OROPHARYNGEAL PHASE   Status: Acute   Comment: TF 

at goal.  eating some, daughter reports no choking, but did cough after meal.  

Better today, will ask ST to re-eval   





(4) Acute kidney injury


Code(s): N17.9 - ACUTE KIDNEY FAILURE, UNSPECIFIED   Status: Resolved   Comment

: AM labs   





(5) Hyperkalemia


Code(s): E87.5 - HYPERKALEMIA   Status: Resolved   Comment: resolved..   





(6) Physical deconditioning


Code(s): R53.81 - OTHER MALAISE   Status: Acute   Comment: to rehab soon, 

family still deciding   





(7) Dyslipidemia


Code(s): E78.5 - HYPERLIPIDEMIA, UNSPECIFIED   Status: Chronic   





(8) HTN (hypertension)


Code(s): I10 - ESSENTIAL (PRIMARY) HYPERTENSION   Status: Chronic   


Qualifiers: 


   Hypertension type: essential hypertension   Qualified Code(s): I10 - 

Essential (primary) hypertension   


Comment: better controlled   





(9) Obesity


Code(s): E66.9 - OBESITY, UNSPECIFIED   Status: Chronic   


Qualifiers: 


   Obesity classification: adult class 3 (BMI >= 40)   Body mass index: BMI 40.0

-44.9 





(10) TIA (transient ischemic attack)


Status: Resolved   Comment: Likely hypertensive emergency causing the neuro 

symptoms, repeat MRI negative for stroke   





- Plan


cont current plan of care, plan discussed w/ family, PT/OT, 





* .

## 2017-12-13 LAB
ANION GAP SERPL CALC-SCNC: 8 MMOL/L (ref 10–20)
BUN SERPL-MCNC: 49 MG/DL (ref 9.8–20.1)
CALCIUM SERPL-MCNC: 9.2 MG/DL (ref 7.8–10.44)
CHLORIDE SERPL-SCNC: 106 MMOL/L (ref 98–107)
CO2 SERPL-SCNC: 29 MMOL/L (ref 23–31)
CREAT CL PREDICTED SERPL C-G-VRATE: 79 ML/MIN (ref 70–130)
MAGNESIUM SERPL-MCNC: 1.4 MG/DL (ref 1.6–2.6)

## 2017-12-13 RX ADMIN — HEPARIN SODIUM SCH UNITS: 5000 INJECTION, SOLUTION INTRAVENOUS; SUBCUTANEOUS at 07:50

## 2017-12-13 RX ADMIN — Medication SCH ML: at 21:36

## 2017-12-13 RX ADMIN — Medication SCH ML: at 07:52

## 2017-12-13 RX ADMIN — CYANOCOBALAMIN TAB 1000 MCG SCH MCG: 1000 TAB at 07:52

## 2017-12-13 RX ADMIN — HEPARIN SODIUM SCH UNITS: 5000 INJECTION, SOLUTION INTRAVENOUS; SUBCUTANEOUS at 14:57

## 2017-12-13 RX ADMIN — HEPARIN SODIUM SCH UNITS: 5000 INJECTION, SOLUTION INTRAVENOUS; SUBCUTANEOUS at 21:36

## 2017-12-13 RX ADMIN — FAMOTIDINE SCH MG: 10 INJECTION, SOLUTION INTRAVENOUS at 07:50

## 2017-12-13 NOTE — PDOC.PN
- Subjective


Encounter Start Date: 12/13/17


Encounter Start Time: 08:45





Pt seen this morning, hungry and didnt realize her tray was next to her.  Ox2.  

no F/c, no N/v/D/c, denies CP or sOB.  no family at bedside at time of visit.





Nursing reports no acute overnight events





discussed with case management, awaiting insurance approval for LTAC.











- Objective


MAR Reviewed: Yes


Vital Signs & Weight: 


 Vital Signs (12 hours)











  Temp Pulse Resp BP Pulse Ox


 


 12/13/17 08:00  97.5 F L  78  18   96


 


 12/13/17 07:51   78   


 


 12/13/17 07:43  98.7 F  78  17  141/70 H  98


 


 12/13/17 04:00  99.6 F  75  20  138/66  100








 Weight











Admit Weight                   250 lb


 


Weight                         317 lb














I&O: 


 











 12/12/17 12/13/17 12/14/17





 06:59 06:59 06:59


 


Intake Total 2440 2620 240


 


Output Total 2100 1800 


 


Balance 340 820 240











Result Diagrams: 


 12/12/17 05:45





 12/13/17 05:30


Radiology Reviewed by me: Yes


EKG Reviewed by me: Yes





Phys Exam





- Physical Examination


Constitutional: NAD


HEENT: PERRLA, moist MMs, sclera anicteric, oral pharynx no lesions


Neck: no nodes, no JVD, supple, full ROM


Respiratory: no wheezing, no rales, no rhonchi, clear to auscultation bilateral


Cardiovascular: RRR, no significant murmur, no rub


Gastrointestinal: soft, non-tender, no distention, positive bowel sounds


Musculoskeletal: pulses present, edema present


Neurological: non-focal, normal sensation, moves all 4 limbs


Lymphatic: no nodes


Psychiatric: normal affect


Deviation from normal: AOX2


Skin: no rash, normal turgor, cap refill <2 seconds





Dx/Plan


(1) Metabolic encephalopathy


Code(s): G93.41 - METABOLIC ENCEPHALOPATHY   Status: Resolved   Comment: Ox3 

earlier.  slow to improve   





(2) CKD (chronic kidney disease) stage 3, GFR 30-59 ml/min


Code(s): N18.3 - CHRONIC KIDNEY DISEASE, STAGE 3 (MODERATE)   Status: Chronic   





(3) Oropharyngeal dysphagia


Code(s): R13.12 - DYSPHAGIA, OROPHARYNGEAL PHASE   Status: Acute   Comment: TF 

at goal.  eating some, daughter reports no choking, but did cough after meal.  

Better today, will ask ST to re-eval   





(4) Acute kidney injury


Code(s): N17.9 - ACUTE KIDNEY FAILURE, UNSPECIFIED   Status: Resolved   Comment

: AM labs   





(5) Hyperkalemia


Code(s): E87.5 - HYPERKALEMIA   Status: Resolved   Comment: resolved..   





(6) Physical deconditioning


Code(s): R53.81 - OTHER MALAISE   Status: Acute   Comment: to rehab soon, 

family still deciding   





(7) Dyslipidemia


Code(s): E78.5 - HYPERLIPIDEMIA, UNSPECIFIED   Status: Chronic   





(8) HTN (hypertension)


Code(s): I10 - ESSENTIAL (PRIMARY) HYPERTENSION   Status: Chronic   


Qualifiers: 


   Hypertension type: essential hypertension   Qualified Code(s): I10 - 

Essential (primary) hypertension   


Comment: better controlled   





(9) Obesity


Code(s): E66.9 - OBESITY, UNSPECIFIED   Status: Chronic   


Qualifiers: 


   Obesity classification: adult class 3 (BMI >= 40)   Body mass index: BMI 40.0

-44.9 





(10) TIA (transient ischemic attack)


Status: Resolved   Comment: Likely hypertensive emergency causing the neuro 

symptoms, repeat MRI negative for stroke   





- Plan





* .

## 2017-12-13 NOTE — PRG
DATE OF SERVICE:  12/13/2017

 

She is confused, but speaking very clearly.

 

PHYSICAL EXAMINATION:

VITAL SIGNS:  Temperature 97.5, pulse 78, respirations 18, O2 sat 96%, blood pressure 141/70.

HEENT:  Unremarkable.

NECK:   No JVD.

CHEST:  Clear.

CARDIAC:  S1, S2 regular.

ABDOMEN:  Soft.

EXTREMITIES:  No edema.

 

LABORATORY DATA:  Sodium 139, potassium 4.1, chloride 106, CO2 29, BUN 49,  creatinine 1.2, glucose 1
17.

 

ASSESSMENT:

1.  Slowly improving mental status.

2.  Hypertension.

3.  Probably adverse reaction of Mysoline. 

 

RECOMMENDATIONS:  She seems to be doing better from a pulmonary standpoint, no further recommendation
s.  We are available, please reconsult for further questions.

## 2017-12-14 LAB
ANION GAP SERPL CALC-SCNC: 11 MMOL/L (ref 10–20)
BUN SERPL-MCNC: 46 MG/DL (ref 9.8–20.1)
CALCIUM SERPL-MCNC: 9 MG/DL (ref 7.8–10.44)
CHLORIDE SERPL-SCNC: 103 MMOL/L (ref 98–107)
CO2 SERPL-SCNC: 27 MMOL/L (ref 23–31)
CREAT CL PREDICTED SERPL C-G-VRATE: 84 ML/MIN (ref 70–130)
MAGNESIUM SERPL-MCNC: 1.8 MG/DL (ref 1.6–2.6)

## 2017-12-14 RX ADMIN — Medication SCH ML: at 21:29

## 2017-12-14 RX ADMIN — FAMOTIDINE SCH MG: 10 INJECTION, SOLUTION INTRAVENOUS at 08:32

## 2017-12-14 RX ADMIN — HEPARIN SODIUM SCH UNITS: 5000 INJECTION, SOLUTION INTRAVENOUS; SUBCUTANEOUS at 16:32

## 2017-12-14 RX ADMIN — Medication SCH ML: at 08:32

## 2017-12-14 RX ADMIN — HEPARIN SODIUM SCH UNITS: 5000 INJECTION, SOLUTION INTRAVENOUS; SUBCUTANEOUS at 08:32

## 2017-12-14 RX ADMIN — CYANOCOBALAMIN TAB 1000 MCG SCH MCG: 1000 TAB at 08:31

## 2017-12-14 RX ADMIN — HEPARIN SODIUM SCH UNITS: 5000 INJECTION, SOLUTION INTRAVENOUS; SUBCUTANEOUS at 21:28

## 2017-12-14 NOTE — PDOC.PN
- Subjective


Encounter Start Date: 12/14/17


Encounter Start Time: 08:20





Pt seen and examined earlier on rounds, much more alert, talking clearer and 

Ox3.





Eating a more normal diet, did well with re-eval by ST yesterday.





awaiting LTAC approval.  No F/C, no N/V/D/C, no CP or SOB





10 point ROS performed and neg for all systems except as above





- Objective


MAR Reviewed: Yes


Vital Signs & Weight: 


 Vital Signs (12 hours)











  Temp Pulse Resp BP BP Pulse Ox


 


 12/14/17 11:00     133/68  


 


 12/14/17 08:00  99.0 F  80  18   


 


 12/14/17 07:44  99.0 F  80  18   133/73  97








 Weight











Admit Weight                   250 lb


 


Weight                         5.104 oz














I&O: 


 











 12/13/17 12/14/17 12/15/17





 06:59 06:59 06:59


 


Intake Total 2620 960 100


 


Output Total 1800 1950 


 


Balance 820 -990 100











Result Diagrams: 


 12/12/17 05:45





 12/14/17 04:14


Radiology Reviewed by me: No


EKG Reviewed by me: No





Phys Exam





- Physical Examination


Constitutional: NAD


HEENT: PERRLA, moist MMs, sclera anicteric, oral pharynx no lesions


Neck: no nodes, no JVD, supple, full ROM


Respiratory: no wheezing, no rales, no rhonchi, clear to auscultation bilateral


Cardiovascular: RRR, no significant murmur, no rub


Gastrointestinal: soft, non-tender, no distention, positive bowel sounds


Musculoskeletal: pulses present, edema present


Neurological: non-focal, moves all 4 limbs


Lymphatic: no nodes


Psychiatric: normal affect, A&O x 3


Skin: no rash, normal turgor, cap refill <2 seconds





Dx/Plan


(1) Metabolic encephalopathy


Code(s): G93.41 - METABOLIC ENCEPHALOPATHY   Status: Resolved   Comment: Ox3 

earlier.  markedly improved today.  speech almost normal.   





(2) CKD (chronic kidney disease) stage 3, GFR 30-59 ml/min


Code(s): N18.3 - CHRONIC KIDNEY DISEASE, STAGE 3 (MODERATE)   Status: Chronic   





(3) Oropharyngeal dysphagia


Code(s): R13.12 - DYSPHAGIA, OROPHARYNGEAL PHASE   Status: Acute   Comment: TF 

at goal.  Diet upgraded by ST.   





(4) Acute kidney injury


Code(s): N17.9 - ACUTE KIDNEY FAILURE, UNSPECIFIED   Status: Resolved   Comment

: AM labs   





(5) Hyperkalemia


Code(s): E87.5 - HYPERKALEMIA   Status: Resolved   Comment: resolved..   





(6) Physical deconditioning


Code(s): R53.81 - OTHER MALAISE   Status: Acute   Comment: to rehab soon, 

pending LTAC approval in [facility]   





(7) Dyslipidemia


Code(s): E78.5 - HYPERLIPIDEMIA, UNSPECIFIED   Status: Chronic   





(8) HTN (hypertension)


Code(s): I10 - ESSENTIAL (PRIMARY) HYPERTENSION   Status: Chronic   


Qualifiers: 


   Hypertension type: essential hypertension   Qualified Code(s): I10 - 

Essential (primary) hypertension   


Comment: better controlled   





(9) Obesity


Code(s): E66.9 - OBESITY, UNSPECIFIED   Status: Chronic   


Qualifiers: 


   Obesity classification: adult class 3 (BMI >= 40)   Body mass index: BMI 40.0

-44.9 





(10) TIA (transient ischemic attack)


Status: Resolved   Comment: Likely hypertensive emergency causing the neuro 

symptoms, repeat MRI negative for stroke   





- Plan


cont current plan of care, plan discussed w/ family, PT/OT, , 

speech therapy, out of bed/ambulate





* .

## 2017-12-15 LAB
ANION GAP SERPL CALC-SCNC: 9 MMOL/L (ref 10–20)
BUN SERPL-MCNC: 41 MG/DL (ref 9.8–20.1)
CALCIUM SERPL-MCNC: 9.3 MG/DL (ref 7.8–10.44)
CHLORIDE SERPL-SCNC: 104 MMOL/L (ref 98–107)
CO2 SERPL-SCNC: 30 MMOL/L (ref 23–31)
CREAT CL PREDICTED SERPL C-G-VRATE: 0 ML/MIN (ref 70–130)
MAGNESIUM SERPL-MCNC: 1.7 MG/DL (ref 1.6–2.6)

## 2017-12-15 RX ADMIN — FAMOTIDINE SCH MG: 10 INJECTION, SOLUTION INTRAVENOUS at 07:57

## 2017-12-15 RX ADMIN — HEPARIN SODIUM SCH UNITS: 5000 INJECTION, SOLUTION INTRAVENOUS; SUBCUTANEOUS at 14:41

## 2017-12-15 RX ADMIN — Medication SCH ML: at 21:12

## 2017-12-15 RX ADMIN — Medication SCH ML: at 07:58

## 2017-12-15 RX ADMIN — HEPARIN SODIUM SCH: 5000 INJECTION, SOLUTION INTRAVENOUS; SUBCUTANEOUS at 21:11

## 2017-12-15 RX ADMIN — HEPARIN SODIUM SCH UNITS: 5000 INJECTION, SOLUTION INTRAVENOUS; SUBCUTANEOUS at 15:13

## 2017-12-15 RX ADMIN — HEPARIN SODIUM SCH UNITS: 5000 INJECTION, SOLUTION INTRAVENOUS; SUBCUTANEOUS at 07:56

## 2017-12-15 RX ADMIN — CYANOCOBALAMIN TAB 1000 MCG SCH MCG: 1000 TAB at 07:57

## 2017-12-15 NOTE — PDOC.PN
- Subjective


Encounter Start Date: 12/15/17


Encounter Start Time: 09:40





Pt seen on rounds.  got done with PT earlier, and sleepy.  Tolerating diet, no F

/C, no N/V/D/C.





Discussed with CM, [facility] has denied LTAC placement, my number given and Peer-to

-Peer arranged.





Discussed with MD, pt doesn't have acute care needs, and they would approve SNF 

placement.  Will contact FADY daughter later today.





10 point ROs performed and neg for all systems except as above





- Objective


MAR Reviewed: Yes


Vital Signs & Weight: 


 Vital Signs (12 hours)











  Temp Pulse Resp BP Pulse Ox


 


 12/15/17 08:01  98.2 F  74  18  147/77 H  95


 


 12/15/17 08:00  98.2 F  74  18   98


 


 12/15/17 07:57   72   


 


 12/14/17 23:45   72  17   94 L








 Weight











Admit Weight                   250 lb


 


Weight                         320 lb














I&O: 


 











 12/14/17 12/15/17 12/16/17





 06:59 06:59 06:59


 


Intake Total 960 1060 240


 


Output Total 1950 1850 


 


Balance -990 -790 240











Result Diagrams: 


 12/12/17 05:45





 12/15/17 03:40


Radiology Reviewed by me: Yes


EKG Reviewed by me: Yes





Phys Exam





- Physical Examination


Constitutional: NAD


HEENT: PERRLA, moist MMs, sclera anicteric, oral pharynx no lesions


Neck: no nodes, no JVD, supple, full ROM


Respiratory: no wheezing, no rales, no rhonchi, clear to auscultation bilateral


Cardiovascular: RRR, no significant murmur, no rub


Gastrointestinal: soft, non-tender, positive bowel sounds


Musculoskeletal: no edema, pulses present


Neurological: non-focal, normal sensation, moves all 4 limbs


3/5 strength bilater UE and LE


Lymphatic: no nodes


Psychiatric: normal affect, A&O x 3


Skin: no rash, normal turgor, cap refill <2 seconds





Dx/Plan


(1) Metabolic encephalopathy


Code(s): G93.41 - METABOLIC ENCEPHALOPATHY   Status: Resolved   Comment: Ox3 

earlier.  markedly improved.  speech normal but shaky   





(2) CKD (chronic kidney disease) stage 3, GFR 30-59 ml/min


Code(s): N18.3 - CHRONIC KIDNEY DISEASE, STAGE 3 (MODERATE)   Status: Chronic   

Comment: GFR up over 60 now   





(3) Oropharyngeal dysphagia


Code(s): R13.12 - DYSPHAGIA, OROPHARYNGEAL PHASE   Status: Acute   Comment: TF 

at goal.  Diet upgraded by ST.   





(4) Acute kidney injury


Code(s): N17.9 - ACUTE KIDNEY FAILURE, UNSPECIFIED   Status: Resolved   Comment

: AM labs   





(5) Hyperkalemia


Code(s): E87.5 - HYPERKALEMIA   Status: Resolved   Comment: resolved..   





(6) Physical deconditioning


Code(s): R53.81 - OTHER MALAISE   Status: Acute   Comment: to rehab soon, LTAC 

denied.  Will discuss with daughter need for SNF.  will begin process   





(7) Dyslipidemia


Code(s): E78.5 - HYPERLIPIDEMIA, UNSPECIFIED   Status: Chronic   





(8) HTN (hypertension)


Code(s): I10 - ESSENTIAL (PRIMARY) HYPERTENSION   Status: Chronic   


Qualifiers: 


   Hypertension type: essential hypertension   Qualified Code(s): I10 - 

Essential (primary) hypertension   


Comment: better controlled   





(9) Obesity


Code(s): E66.9 - OBESITY, UNSPECIFIED   Status: Chronic   


Qualifiers: 


   Obesity classification: adult class 3 (BMI >= 40)   Body mass index: BMI 40.0

-44.9 





(10) TIA (transient ischemic attack)


Status: Resolved   Comment: Likely hypertensive emergency causing the neuro 

symptoms, repeat MRI negative for stroke   





- Plan





* .

## 2017-12-16 LAB
ANION GAP SERPL CALC-SCNC: 10 MMOL/L (ref 10–20)
BASOPHILS # BLD AUTO: 0 THOU/UL (ref 0–0.2)
BASOPHILS NFR BLD AUTO: 0.6 % (ref 0–1)
BUN SERPL-MCNC: 36 MG/DL (ref 9.8–20.1)
CALCIUM SERPL-MCNC: 9.5 MG/DL (ref 7.8–10.44)
CHLORIDE SERPL-SCNC: 104 MMOL/L (ref 98–107)
CO2 SERPL-SCNC: 32 MMOL/L (ref 23–31)
CREAT CL PREDICTED SERPL C-G-VRATE: 102 ML/MIN (ref 70–130)
EOSINOPHIL # BLD AUTO: 0.3 THOU/UL (ref 0–0.7)
EOSINOPHIL NFR BLD AUTO: 4.7 % (ref 0–10)
HCT VFR BLD CALC: 24.6 % (ref 36–47)
LYMPHOCYTES # BLD: 1.4 THOU/UL (ref 1.2–3.4)
LYMPHOCYTES NFR BLD AUTO: 23.6 % (ref 21–51)
MAGNESIUM SERPL-MCNC: 1.3 MG/DL (ref 1.6–2.6)
MONOCYTES # BLD AUTO: 0.5 THOU/UL (ref 0.11–0.59)
MONOCYTES NFR BLD AUTO: 9 % (ref 0–10)
NEUTROPHILS # BLD AUTO: 3.7 THOU/UL (ref 1.4–6.5)
RBC # BLD AUTO: 2.35 MILL/UL (ref 4.2–5.4)
WBC # BLD AUTO: 6 THOU/UL (ref 4.8–10.8)

## 2017-12-16 RX ADMIN — Medication SCH ML: at 20:44

## 2017-12-16 RX ADMIN — HEPARIN SODIUM SCH UNITS: 5000 INJECTION, SOLUTION INTRAVENOUS; SUBCUTANEOUS at 20:40

## 2017-12-16 RX ADMIN — Medication SCH ML: at 08:02

## 2017-12-16 RX ADMIN — CYANOCOBALAMIN TAB 1000 MCG SCH MCG: 1000 TAB at 08:01

## 2017-12-16 RX ADMIN — FAMOTIDINE SCH MG: 10 INJECTION, SOLUTION INTRAVENOUS at 08:02

## 2017-12-16 RX ADMIN — HEPARIN SODIUM SCH UNITS: 5000 INJECTION, SOLUTION INTRAVENOUS; SUBCUTANEOUS at 08:02

## 2017-12-16 NOTE — PDOC.PN
- Subjective


Encounter Start Date: 12/16/17


Encounter Start Time: 10:00





Pt still doing well.





No acute events overnight.  awaiting rehab approval.  Family persistent about 

trying to get IPR, rabiahernando i do not believ ept will physically qualify and 

likely needs a SNF.  referral placed, awaiting eval and decision





No F/C, no N/V/D/C, no CP or sOB, no cough or sputum production.  tolerating po





10 point ROs performed and neg for all except as per HPI





- Objective


MAR Reviewed: Yes


Vital Signs & Weight: 


 Vital Signs (12 hours)











  Temp Pulse Resp BP BP Pulse Ox


 


 12/16/17 08:01   88   159/71 H  


 


 12/16/17 08:00  98.2 F  88  20    96


 


 12/16/17 07:36  98.2 F  87  20   123/79  89 L


 


 12/16/17 07:34  98.2 F  90  16   158/67 H  94 L








 Weight











Admit Weight                   250 lb


 


Weight                         317 lb 10.625 oz














I&O: 


 











 12/15/17 12/16/17 12/17/17





 06:59 06:59 06:59


 


Intake Total 1060 1920 


 


Output Total 1850 2302 


 


Balance -790 -382 











Result Diagrams: 


 12/16/17 03:47





 12/16/17 03:47


Radiology Reviewed by me: Yes


EKG Reviewed by me: Yes





Phys Exam





- Physical Examination


Constitutional: NAD


HEENT: PERRLA, moist MMs, sclera anicteric, oral pharynx no lesions


Neck: no nodes, no JVD, supple, full ROM


Respiratory: no wheezing, no rales, no rhonchi, clear to auscultation bilateral


Cardiovascular: RRR, no significant murmur, no rub


Gastrointestinal: soft, non-tender, no distention, positive bowel sounds


Musculoskeletal: pulses present, edema present


Neurological: non-focal, normal sensation, moves all 4 limbs


sregth still 3-4/5


Lymphatic: no nodes


Psychiatric: normal affect, A&O x 3


Skin: no rash, normal turgor, cap refill <2 seconds





Dx/Plan


(1) Physical deconditioning


Code(s): R53.81 - OTHER MALAISE   Status: Acute   Comment: to rehab soon, LTAC 

denied.  Will discuss with daughter need for SNF.  will begin process.  she is 

adamant about trying IPR first.  Referral made by [facility] 12/15   





(2) CKD (chronic kidney disease) stage 3, GFR 30-59 ml/min


Code(s): N18.3 - CHRONIC KIDNEY DISEASE, STAGE 3 (MODERATE)   Status: Chronic   

Comment: GFR up over 60 now   





(3) Oropharyngeal dysphagia


Code(s): R13.12 - DYSPHAGIA, OROPHARYNGEAL PHASE   Status: Acute   Comment: TF 

at goal.  Diet upgraded by ST.   





(4) Acute kidney injury


Code(s): N17.9 - ACUTE KIDNEY FAILURE, UNSPECIFIED   Status: Resolved   Comment

: AM labs   





(5) Hyperkalemia


Code(s): E87.5 - HYPERKALEMIA   Status: Resolved   Comment: resolved..   





(6) Dyslipidemia


Code(s): E78.5 - HYPERLIPIDEMIA, UNSPECIFIED   Status: Chronic   





(7) HTN (hypertension)


Code(s): I10 - ESSENTIAL (PRIMARY) HYPERTENSION   Status: Chronic   


Qualifiers: 


   Hypertension type: essential hypertension   Qualified Code(s): I10 - 

Essential (primary) hypertension   


Comment: better controlled   





(8) Obesity


Code(s): E66.9 - OBESITY, UNSPECIFIED   Status: Chronic   


Qualifiers: 


   Obesity classification: adult class 3 (BMI >= 40)   Body mass index: BMI 40.0

-44.9 





(9) TIA (transient ischemic attack)


Status: Resolved   Comment: Likely hypertensive emergency causing the neuro 

symptoms, repeat MRI negative for stroke   





(10) Metabolic encephalopathy


Code(s): G93.41 - METABOLIC ENCEPHALOPATHY   Status: Resolved   Comment: Ox3 

earlier.  markedly improved.  speech normal but shaky   





- Plan





* .

## 2017-12-17 RX ADMIN — FAMOTIDINE SCH MG: 10 INJECTION, SOLUTION INTRAVENOUS at 09:28

## 2017-12-17 RX ADMIN — Medication SCH ML: at 09:29

## 2017-12-17 RX ADMIN — CYANOCOBALAMIN TAB 1000 MCG SCH MCG: 1000 TAB at 09:28

## 2017-12-17 RX ADMIN — HEPARIN SODIUM SCH UNITS: 5000 INJECTION, SOLUTION INTRAVENOUS; SUBCUTANEOUS at 20:25

## 2017-12-17 RX ADMIN — HEPARIN SODIUM SCH UNITS: 5000 INJECTION, SOLUTION INTRAVENOUS; SUBCUTANEOUS at 09:28

## 2017-12-17 RX ADMIN — Medication SCH ML: at 20:25

## 2017-12-17 RX ADMIN — HEPARIN SODIUM SCH UNITS: 5000 INJECTION, SOLUTION INTRAVENOUS; SUBCUTANEOUS at 14:54

## 2017-12-17 NOTE — PDOC.PN
- Subjective


Encounter Start Date: 12/17/17


Encounter Start Time: 10:00





feeling better, no events, feeling stronger, no acute events no new complaints.





 at bedside.  updated to current plan.





No F/C, no n/V/D/C, no CP or SOB





10 point ROS performed and neg for all systems except as per HPI





- Objective


MAR Reviewed: Yes


Vital Signs & Weight: 


 Vital Signs (12 hours)











  Temp Pulse Resp BP BP Pulse Ox


 


 12/17/17 11:00  99.1 F  75  16   123/66  96


 


 12/17/17 09:27   86   172/72 H  


 


 12/17/17 08:00  98.1 F  84  16    94 L


 


 12/17/17 07:15  98.1 F  85    172/72 H  94 L


 


 12/17/17 04:00  98.3 F  84  16   166/70 H  97


 


 12/17/17 03:00       97








 Weight











Admit Weight                   250 lb


 


Weight                         5.081 oz














I&O: 


 











 12/16/17 12/17/17 12/18/17





 06:59 06:59 06:59


 


Intake Total 1920 2815 120


 


Output Total 2302 2550 


 


Balance -382 265 120











Result Diagrams: 


 12/16/17 03:47





 12/16/17 03:47





Phys Exam





- Physical Examination


Constitutional: NAD


HEENT: PERRLA, moist MMs, sclera anicteric, oral pharynx no lesions


Neck: no nodes, no JVD, supple, full ROM


Respiratory: no wheezing, no rales, no rhonchi, clear to auscultation bilateral


Cardiovascular: RRR, no significant murmur, no rub


Gastrointestinal: soft, non-tender, no distention, positive bowel sounds


Musculoskeletal: no edema, pulses present


Neurological: non-focal, normal sensation, moves all 4 limbs


Lymphatic: no nodes


Psychiatric: normal affect, A&O x 3


Skin: no rash, normal turgor, cap refill <2 seconds





Dx/Plan


(1) Physical deconditioning


Code(s): R53.81 - OTHER MALAISE   Status: Acute   Comment: to rehab soon, LTAC 

denied.  Will discuss with daughter need for SNF.  will begin process.  she is 

adamant about trying [facility] first.  Referral made by [facility] 12/15   





(2) CKD (chronic kidney disease) stage 3, GFR 30-59 ml/min


Code(s): N18.3 - CHRONIC KIDNEY DISEASE, STAGE 3 (MODERATE)   Status: Chronic   

Comment: GFR up over 60 now   





(3) Oropharyngeal dysphagia


Code(s): R13.12 - DYSPHAGIA, OROPHARYNGEAL PHASE   Status: Acute   Comment: TF 

at goal.  Diet upgraded by ST.   





(4) Acute kidney injury


Code(s): N17.9 - ACUTE KIDNEY FAILURE, UNSPECIFIED   Status: Resolved   Comment

: AM labs   





(5) Hyperkalemia


Code(s): E87.5 - HYPERKALEMIA   Status: Resolved   Comment: resolved..   





(6) Dyslipidemia


Code(s): E78.5 - HYPERLIPIDEMIA, UNSPECIFIED   Status: Chronic   





(7) HTN (hypertension)


Code(s): I10 - ESSENTIAL (PRIMARY) HYPERTENSION   Status: Chronic   


Qualifiers: 


   Hypertension type: essential hypertension   Qualified Code(s): I10 - 

Essential (primary) hypertension   


Comment: better controlled   





(8) Obesity


Code(s): E66.9 - OBESITY, UNSPECIFIED   Status: Chronic   


Qualifiers: 


   Obesity classification: adult class 3 (BMI >= 40)   Body mass index: BMI 40.0

-44.9 





(9) TIA (transient ischemic attack)


Status: Resolved   Comment: Likely hypertensive emergency causing the neuro 

symptoms, repeat MRI negative for stroke   





(10) Metabolic encephalopathy


Code(s): G93.41 - METABOLIC ENCEPHALOPATHY   Status: Resolved   Comment: Ox3 

earlier.  markedly improved.  speech normal but shaky   





- Plan





* .

## 2017-12-18 RX ADMIN — HEPARIN SODIUM SCH UNITS: 5000 INJECTION, SOLUTION INTRAVENOUS; SUBCUTANEOUS at 15:09

## 2017-12-18 RX ADMIN — HEPARIN SODIUM SCH UNITS: 5000 INJECTION, SOLUTION INTRAVENOUS; SUBCUTANEOUS at 08:38

## 2017-12-18 RX ADMIN — Medication SCH ML: at 20:14

## 2017-12-18 RX ADMIN — Medication SCH ML: at 08:38

## 2017-12-18 RX ADMIN — CYANOCOBALAMIN TAB 1000 MCG SCH MCG: 1000 TAB at 08:38

## 2017-12-18 RX ADMIN — FAMOTIDINE SCH MG: 10 INJECTION, SOLUTION INTRAVENOUS at 08:38

## 2017-12-18 RX ADMIN — HEPARIN SODIUM SCH UNITS: 5000 INJECTION, SOLUTION INTRAVENOUS; SUBCUTANEOUS at 20:14

## 2017-12-19 ENCOUNTER — HOSPITAL ENCOUNTER (INPATIENT)
Dept: HOSPITAL 57 - BURMED | Age: 82
LOS: 21 days | Discharge: ANOTHER HOSPITAL | DRG: 292 | End: 2018-01-09
Attending: FAMILY MEDICINE | Admitting: FAMILY MEDICINE
Payer: MEDICARE

## 2017-12-19 VITALS — TEMPERATURE: 99.4 F | DIASTOLIC BLOOD PRESSURE: 65 MMHG | SYSTOLIC BLOOD PRESSURE: 152 MMHG

## 2017-12-19 VITALS — BODY MASS INDEX: 42.4 KG/M2

## 2017-12-19 DIAGNOSIS — Z86.73: ICD-10-CM

## 2017-12-19 DIAGNOSIS — R29.6: ICD-10-CM

## 2017-12-19 DIAGNOSIS — T42.6X5D: ICD-10-CM

## 2017-12-19 DIAGNOSIS — M19.90: ICD-10-CM

## 2017-12-19 DIAGNOSIS — I11.0: Primary | ICD-10-CM

## 2017-12-19 DIAGNOSIS — Z87.440: ICD-10-CM

## 2017-12-19 DIAGNOSIS — L02.211: ICD-10-CM

## 2017-12-19 DIAGNOSIS — R13.12: ICD-10-CM

## 2017-12-19 DIAGNOSIS — N39.0: ICD-10-CM

## 2017-12-19 DIAGNOSIS — I50.32: ICD-10-CM

## 2017-12-19 DIAGNOSIS — G25.0: ICD-10-CM

## 2017-12-19 DIAGNOSIS — M79.7: ICD-10-CM

## 2017-12-19 DIAGNOSIS — R63.0: ICD-10-CM

## 2017-12-19 DIAGNOSIS — Z91.81: ICD-10-CM

## 2017-12-19 DIAGNOSIS — E66.01: ICD-10-CM

## 2017-12-19 DIAGNOSIS — G47.30: ICD-10-CM

## 2017-12-19 LAB
ANION GAP SERPL CALC-SCNC: 10 MMOL/L (ref 10–20)
BASOPHILS # BLD AUTO: 0 THOU/UL (ref 0–0.2)
BASOPHILS NFR BLD AUTO: 0.7 % (ref 0–1)
BUN SERPL-MCNC: 24 MG/DL (ref 9.8–20.1)
CALCIUM SERPL-MCNC: 9.6 MG/DL (ref 7.8–10.44)
CHLORIDE SERPL-SCNC: 99 MMOL/L (ref 98–107)
CO2 SERPL-SCNC: 34 MMOL/L (ref 23–31)
CREAT CL PREDICTED SERPL C-G-VRATE: 91 ML/MIN (ref 70–130)
EOSINOPHIL # BLD AUTO: 0.4 THOU/UL (ref 0–0.7)
EOSINOPHIL NFR BLD AUTO: 5.7 % (ref 0–10)
HCT VFR BLD CALC: 26.1 % (ref 36–47)
LYMPHOCYTES # BLD: 2 THOU/UL (ref 1.2–3.4)
LYMPHOCYTES NFR BLD AUTO: 31.9 % (ref 21–51)
MAGNESIUM SERPL-MCNC: 1.3 MG/DL (ref 1.6–2.6)
MONOCYTES # BLD AUTO: 0.8 THOU/UL (ref 0.11–0.59)
MONOCYTES NFR BLD AUTO: 13 % (ref 0–10)
NEUTROPHILS # BLD AUTO: 3.1 THOU/UL (ref 1.4–6.5)
RBC # BLD AUTO: 2.46 MILL/UL (ref 4.2–5.4)
WBC # BLD AUTO: 6.4 THOU/UL (ref 4.8–10.8)

## 2017-12-19 PROCEDURE — 87186 SC STD MICRODIL/AGAR DIL: CPT

## 2017-12-19 PROCEDURE — 82553 CREATINE MB FRACTION: CPT

## 2017-12-19 PROCEDURE — 80053 COMPREHEN METABOLIC PANEL: CPT

## 2017-12-19 PROCEDURE — 85018 HEMOGLOBIN: CPT

## 2017-12-19 PROCEDURE — 84484 ASSAY OF TROPONIN QUANT: CPT

## 2017-12-19 PROCEDURE — 82668 ASSAY OF ERYTHROPOIETIN: CPT

## 2017-12-19 PROCEDURE — 83550 IRON BINDING TEST: CPT

## 2017-12-19 PROCEDURE — 36415 COLL VENOUS BLD VENIPUNCTURE: CPT

## 2017-12-19 PROCEDURE — 82728 ASSAY OF FERRITIN: CPT

## 2017-12-19 PROCEDURE — 85014 HEMATOCRIT: CPT

## 2017-12-19 PROCEDURE — 85025 COMPLETE CBC W/AUTO DIFF WBC: CPT

## 2017-12-19 PROCEDURE — A4216 STERILE WATER/SALINE, 10 ML: HCPCS

## 2017-12-19 PROCEDURE — 85046 RETICYTE/HGB CONCENTRATE: CPT

## 2017-12-19 PROCEDURE — 82565 ASSAY OF CREATININE: CPT

## 2017-12-19 PROCEDURE — 83605 ASSAY OF LACTIC ACID: CPT

## 2017-12-19 PROCEDURE — 80202 ASSAY OF VANCOMYCIN: CPT

## 2017-12-19 PROCEDURE — 81001 URINALYSIS AUTO W/SCOPE: CPT

## 2017-12-19 PROCEDURE — 36416 COLLJ CAPILLARY BLOOD SPEC: CPT

## 2017-12-19 PROCEDURE — 85049 AUTOMATED PLATELET COUNT: CPT

## 2017-12-19 PROCEDURE — 87086 URINE CULTURE/COLONY COUNT: CPT

## 2017-12-19 PROCEDURE — 71010: CPT

## 2017-12-19 PROCEDURE — 85610 PROTHROMBIN TIME: CPT

## 2017-12-19 PROCEDURE — 87077 CULTURE AEROBIC IDENTIFY: CPT

## 2017-12-19 PROCEDURE — 83540 ASSAY OF IRON: CPT

## 2017-12-19 RX ADMIN — CYANOCOBALAMIN TAB 1000 MCG SCH MCG: 1000 TAB at 08:20

## 2017-12-19 RX ADMIN — HEPARIN SODIUM SCH UNITS: 5000 INJECTION, SOLUTION INTRAVENOUS; SUBCUTANEOUS at 08:20

## 2017-12-19 RX ADMIN — Medication SCH ML: at 08:21

## 2017-12-19 RX ADMIN — Medication SCH ML: at 22:06

## 2017-12-19 RX ADMIN — HEPARIN SODIUM SCH UNITS: 5000 INJECTION, SOLUTION INTRAVENOUS; SUBCUTANEOUS at 15:35

## 2017-12-19 RX ADMIN — FAMOTIDINE SCH MG: 10 INJECTION, SOLUTION INTRAVENOUS at 08:21

## 2017-12-19 RX ADMIN — Medication PRN ML: at 22:12

## 2017-12-19 NOTE — PDOC.PN
- Subjective


Encounter Start Date: 12/19/17


Encounter Start Time: 09:40





Pt doing much better, nutrioiton note reviewed.  DFT and tube feeds stopped and 

tube pulled, supplements ordered.





Doing better daily.  Pt family agreeable to going to [facility].   expect approval today





No f/C, no N/V/D/C, no CP, no SOB





10 point ROS performed and neg for all systems except as per HPI





- Objective


MAR Reviewed: Yes


Vital Signs & Weight: 


 Vital Signs (12 hours)











  Temp Pulse Resp BP Pulse Ox


 


 12/19/17 08:19   87   


 


 12/19/17 08:00  98.7 F  87  16   95


 


 12/19/17 07:48  98.7 F  87  16  131/64  95


 


 12/19/17 04:00  99.2 F  81  18  161/74 H  94 L








 Weight











Admit Weight                   250 lb


 


Weight                         322 lb 0.467 oz














I&O: 


 











 12/18/17 12/19/17 12/20/17





 06:59 06:59 06:59


 


Intake Total 1920 3740 240


 


Output Total 1500 3000 


 


Balance 420 740 240











Result Diagrams: 


 12/19/17 03:42





 12/19/17 03:42


Radiology Reviewed by me: No


EKG Reviewed by me: No





Phys Exam





- Physical Examination


Constitutional: NAD


HEENT: PERRLA, moist MMs, sclera anicteric, oral pharynx no lesions


Neck: no nodes, no JVD, supple, full ROM


Respiratory: no wheezing, no rales, no rhonchi, clear to auscultation bilateral


Cardiovascular: RRR, no significant murmur, no rub


Gastrointestinal: soft, non-tender, no distention, positive bowel sounds


Musculoskeletal: no edema, pulses present


Neurological: non-focal, normal sensation, moves all 4 limbs


Lymphatic: no nodes


Psychiatric: normal affect, A&O x 3


Skin: no rash, normal turgor, cap refill <2 seconds





Dx/Plan


(1) Physical deconditioning


Code(s): R53.81 - OTHER MALAISE   Status: Acute   Comment: to rehab soon, LTAC 

denied.  Will discuss with daughter need for SNF.  will begin process.  she is 

adamant about trying appelaing LTAC and then IPR first.  Referral made by [facility] 12/

15.  Pt has no acute care needs, i do not see any way of getting an LTAC 

approved.  Family agreeable to SNF and have selected [facility] 

bed.  expect approval, d/C when accepted,   





(2) CKD (chronic kidney disease) stage 3, GFR 30-59 ml/min


Code(s): N18.3 - CHRONIC KIDNEY DISEASE, STAGE 3 (MODERATE)   Status: Chronic   

Comment: GFR up over 60 now   





(3) Oropharyngeal dysphagia


Code(s): R13.12 - DYSPHAGIA, OROPHARYNGEAL PHASE   Status: Acute   Comment: 

improved, adequate po intake.  TF stopped, DFT pulled, supplements added   





(4) Acute kidney injury


Code(s): N17.9 - ACUTE KIDNEY FAILURE, UNSPECIFIED   Status: Resolved   Comment

: AM labs   





(5) Hyperkalemia


Code(s): E87.5 - HYPERKALEMIA   Status: Resolved   Comment: resolved..   





(6) Dyslipidemia


Code(s): E78.5 - HYPERLIPIDEMIA, UNSPECIFIED   Status: Chronic   





(7) HTN (hypertension)


Code(s): I10 - ESSENTIAL (PRIMARY) HYPERTENSION   Status: Chronic   


Qualifiers: 


   Hypertension type: essential hypertension   Qualified Code(s): I10 - 

Essential (primary) hypertension   


Comment: better controlled   





(8) Obesity


Code(s): E66.9 - OBESITY, UNSPECIFIED   Status: Chronic   


Qualifiers: 


   Obesity classification: adult class 3 (BMI >= 40)   Body mass index: BMI 40.0

-44.9 





(9) TIA (transient ischemic attack)


Status: Resolved   Comment: Likely hypertensive emergency causing the neuro 

symptoms, repeat MRI negative for stroke   





(10) Metabolic encephalopathy


Code(s): G93.41 - METABOLIC ENCEPHALOPATHY   Status: Resolved   Comment: Ox3 

earlier.  markedly improved.  speech normal but shaky   





- Plan





* .

## 2017-12-20 LAB
BACTERIA UR QL AUTO: (no result) HPF
CREAT CL PREDICTED SERPL C-G-VRATE: 101 ML/MIN (ref 70–130)
HGB BLD-MCNC: 8.1 G/DL (ref 12–16)
IRON SERPL-MCNC: 51 UG/DL (ref 50–170)
PLATELET # BLD AUTO: 332 THOU/UL (ref 130–400)
PROT UR STRIP.AUTO-MCNC: 30 MG/DL
RBC UR QL AUTO: (no result) HPF (ref 0–3)
RETICS/RBC NFR: 6.5 % (ref 0.5–1.5)
SP GR UR STRIP: 1.01 (ref 1–1.03)
UIBC SERPL-MCNC: 156 MCG/DL (ref 265–497)
WBC UR QL AUTO: (no result) HPF (ref 0–3)

## 2017-12-20 RX ADMIN — Medication SCH ML: at 09:30

## 2017-12-20 RX ADMIN — Medication SCH ML: at 21:02

## 2017-12-20 RX ADMIN — CYANOCOBALAMIN TAB 1000 MCG SCH MCG: 1000 TAB at 09:29

## 2017-12-21 LAB
CREAT CL PREDICTED SERPL C-G-VRATE: 81 ML/MIN (ref 70–130)
HGB BLD-MCNC: 8.2 G/DL (ref 12–16)
PLATELET # BLD AUTO: 316 THOU/UL (ref 130–400)

## 2017-12-21 RX ADMIN — Medication SCH ML: at 21:18

## 2017-12-21 RX ADMIN — CYANOCOBALAMIN TAB 1000 MCG SCH MCG: 1000 TAB at 09:32

## 2017-12-21 RX ADMIN — Medication SCH ML: at 09:26

## 2017-12-21 NOTE — RAD
PORTABLE CHEST 1 VIEW:

 

Date:  12/21/17 

Time:  0749 hours

 

HISTORY:  

Pneumonia. 

 

FINDINGS/IMPRESSION: 

 

Comparison made with exam of 12/06/17. 

 

The heart size is enlarged. Left-sided upper extremity PICC line has been placed. Feeding tube has be
en removed in the interim. Bibasilar infiltrates are noted. No pneumothoraces or large effusions are 
seen. There are postop changes of right rotator cuff repair. 

 

 

POS: [facility]

## 2017-12-22 RX ADMIN — CYANOCOBALAMIN TAB 1000 MCG SCH MCG: 1000 TAB at 10:04

## 2017-12-22 RX ADMIN — Medication SCH ML: at 10:07

## 2017-12-22 RX ADMIN — Medication SCH ML: at 21:23

## 2017-12-23 LAB
CREAT CL PREDICTED SERPL C-G-VRATE: 88 ML/MIN (ref 70–130)
HGB BLD-MCNC: 8.3 G/DL (ref 12–16)
PLATELET # BLD AUTO: 304 THOU/UL (ref 130–400)

## 2017-12-23 RX ADMIN — CYANOCOBALAMIN TAB 1000 MCG SCH MCG: 1000 TAB at 08:17

## 2017-12-23 RX ADMIN — Medication SCH ML: at 20:44

## 2017-12-23 RX ADMIN — Medication SCH ML: at 08:23

## 2017-12-24 RX ADMIN — Medication SCH ML: at 07:54

## 2017-12-24 RX ADMIN — Medication SCH ML: at 20:53

## 2017-12-24 RX ADMIN — CYANOCOBALAMIN TAB 1000 MCG SCH MCG: 1000 TAB at 07:51

## 2017-12-25 LAB
ALBUMIN SERPL BCG-MCNC: 2.6 G/DL (ref 3.4–4.8)
ALP SERPL-CCNC: 118 U/L (ref 40–150)
ALT SERPL W P-5'-P-CCNC: 22 U/L (ref 8–55)
ANION GAP SERPL CALC-SCNC: 14 MMOL/L (ref 10–20)
AST SERPL-CCNC: 23 U/L (ref 5–34)
BASOPHILS # BLD AUTO: 0.1 THOU/UL (ref 0–0.2)
BASOPHILS NFR BLD AUTO: 2.5 % (ref 0–1)
BILIRUB SERPL-MCNC: 0.3 MG/DL (ref 0.2–1.2)
BUN SERPL-MCNC: 17 MG/DL (ref 9.8–20.1)
CALCIUM SERPL-MCNC: 9.2 MG/DL (ref 7.8–10.44)
CHLORIDE SERPL-SCNC: 101 MMOL/L (ref 98–107)
CO2 SERPL-SCNC: 30 MMOL/L (ref 23–31)
CREAT CL PREDICTED SERPL C-G-VRATE: 64 ML/MIN (ref 70–130)
EOSINOPHIL # BLD AUTO: 0.2 THOU/UL (ref 0–0.7)
EOSINOPHIL NFR BLD AUTO: 2.5 % (ref 0–10)
GLOBULIN SER CALC-MCNC: 3 G/DL (ref 2.4–3.5)
GLUCOSE SERPL-MCNC: 96 MG/DL (ref 83–110)
HGB BLD-MCNC: 8.6 G/DL (ref 12–16)
LYMPHOCYTES # BLD AUTO: 1.8 THOU/UL (ref 1.2–3.4)
LYMPHOCYTES NFR BLD AUTO: 30.3 % (ref 21–51)
MCH RBC QN AUTO: 32.8 PG (ref 27–31)
MCV RBC AUTO: 104 FL (ref 81–99)
MONOCYTES # BLD AUTO: 0.6 THOU/UL (ref 0.11–0.59)
MONOCYTES NFR BLD AUTO: 9.9 % (ref 0–10)
NEUTROPHILS # BLD AUTO: 3.2 THOU/UL (ref 1.4–6.5)
NEUTROPHILS NFR BLD AUTO: 54.8 % (ref 42–75)
PLATELET # BLD AUTO: 231 THOU/UL (ref 130–400)
POTASSIUM SERPL-SCNC: 4 MMOL/L (ref 3.5–5.1)
RBC # BLD AUTO: 2.61 MILL/UL (ref 4.2–5.4)
SODIUM SERPL-SCNC: 141 MMOL/L (ref 136–145)
WBC # BLD AUTO: 5.9 THOU/UL (ref 4.8–10.8)

## 2017-12-25 RX ADMIN — CYANOCOBALAMIN TAB 1000 MCG SCH MCG: 1000 TAB at 09:08

## 2017-12-25 RX ADMIN — Medication SCH ML: at 09:09

## 2017-12-25 RX ADMIN — Medication SCH ML: at 22:34

## 2017-12-25 NOTE — RAD
CHEST 1 VIEW:

 

Date:  12/25/17 

 

HISTORY:  

Pneumonia. 

 

COMPARISON:  

Chest 1 view dated 12/21/17. 

 

FINDINGS:

Mild interval improvement of the lower lobe air space opacities. Central venous catheter tip is uncha
nged and in good position. No pneumothorax. Suture anchor present in right humeral head. 

 

IMPRESSION: 

Improving air space opacities. 

 

 

POS: [facility]

## 2017-12-26 RX ADMIN — Medication SCH ML: at 20:32

## 2017-12-26 RX ADMIN — CYANOCOBALAMIN TAB 1000 MCG SCH MCG: 1000 TAB at 08:48

## 2017-12-26 RX ADMIN — Medication SCH ML: at 08:44

## 2017-12-26 NOTE — HP
DATE OF ADMISSION:  12/19/2017

 

REASON FOR TRANSFER:  To [facility] was due to deconditioning with recent meta
bolic encephalopathy.

 

BRIEF SUMMARY OF HISTORY AND PHYSICAL:  The patient is an 83-year-old white female who was initially 
admitted on 11/17/2017 to [facility] with decreased ability to ambulate, recur
rent falls and confusion with dysarthria, slurred speech, and incoherence.  In the emergency room, an
 initial CT scan, as well as further workup including MRI, did not show any acute findings.  The kurt
ent had a labile and difficult to control hypertension, is concerned that she may have had a TIA, but
 during her hospital stay, she continued to progress with generalized diffuse weakness.  Neurology co
nsult was obtained and she was deemed to have encephalopathy, likely metabolic in nature, possibly re
lated to an increased dose of primidone for tremors.  Primidone was discontinued.  The patient had a 
very poor p.o. intake and a Dobhoff tube was placed.  There was a question about a urinary tract infe
ction and/or pneumonia.  She was initially treated for these illnesses empirically, but then stopped 
when she appeared to have no signs of infection.  By the time the patient was stable enough to be dis
charged from the hospital, she was extremely weak, unable to get out of bed, and very poor p.o. intak
e.  It was deemed appropriate to transfer her to [facility] for physical thera
py and occupational therapy and increase her p.o. intake.

 

PAST MEDICAL HISTORY:

1.  Recurrent urinary tract infections.  Currently, she has a Hinds catheter in place due to her rylee
mbulatory status.

2.  Essential tremor.

3.  Fibromyalgia.

4.  Osteoarthritis.

5.  Morbid obesity.

6.  Hypertension.

7.  History of sleep apnea.  The patient has refused CPAP machine.

8.  History of progressive renal insufficiency.

9.  History of progressive deconditioning.

10.  History of prior cerebrovascular accident.

 

PAST SURGICAL HISTORY:  Hysterectomy, cholecystectomy, bilateral knee replacements, right shoulder rowley
rgery.  For full history and physical, see recent history and physical on her recent admission.

 

REVIEW OF SYSTEMS:  At this time, the patient reports some increase in her appetite.  Her confusion h
as significantly improved.  She denies any true depression.  No significant cough reported.  She does
 report shortness of breath with activity, but not at rest, no URI-like symptoms.  No visual changes.
  The patient reports some occasional nausea and upset stomach without emesis, nor diarrhea.  She rep
orts her bowel movements have been normal.  The patient reports diffuse edema of her extremities sinc
e her hospitalization.  No ulcers, no lesions reported by the patient.

 

PHYSICAL EXAMINATION:

VITAL SIGNS:  Blood pressure is 162/68, respiratory rate was 16, pulse was 82.

HEENT:  Oropharynx, mucous membranes were moist.

NECK:  Obese, supple.

CHEST:  Had some faint rhonchi at the bases bilaterally.

HEART:  Regular rate and rhythm without occasional ectopic beats.

ABDOMEN:  Obese, but soft.  Bowel sounds hypoactive but present in all 4 quadrants.  No masses were p
alpated.

EXTREMITIES:  Showed diffuse edema of the hands and feet bilaterally.  A PICC line was in place in th
e left arm.

 

ASSESSMENT AND PLAN:

1.  Severe deconditioning.  The patient had a 1-month hospitalization, has been nonambulatory during 
that time.  We will start on deep venous thrombosis prophylaxis with both mechanical as well as pharm
acotherapy with Lovenox due to a high risk for deep venous thrombosis.  We will start the patient wit
h physical therapy and occupational therapy.  She will likely need a prolonged stay due to her severe
 deconditioning.

2.  Congestive heart failure.  The patient had an echocardiogram with recent hospitalization with an 
ejection fraction of 55% with diastolic dysfunction.  She also had a workup for possible renal artery
 stenosis which showed no obvious significant renal artery stenosis.  We will treat her with continue
d Lasix as previously prescribed and follow her daily weights.

3.  Hypertension.  She had a history of labile hypertension.  We will continue her on current medicat
ions plus clonidine for p.r.n. elevated blood pressure.

4.  Poor appetite.  The patient apparently is improving in her appetite.  We will add high-calorie sh
akes as needed.

5.  DISPOSITION:  The patient [facility] likely [facility]
 due to her morbid obesity.  At this time, we will keep the Hinds catheter in place and may disconti
nue once she is more ambulatory.

## 2017-12-27 LAB
CREAT CL PREDICTED SERPL C-G-VRATE: 72 ML/MIN (ref 70–130)
HGB BLD-MCNC: 9.5 G/DL (ref 12–16)
PLATELET # BLD AUTO: 219 THOU/UL (ref 130–400)

## 2017-12-27 RX ADMIN — CYANOCOBALAMIN TAB 1000 MCG SCH MCG: 1000 TAB at 08:21

## 2017-12-27 RX ADMIN — Medication SCH ML: at 08:19

## 2017-12-27 RX ADMIN — Medication SCH ML: at 21:18

## 2017-12-28 RX ADMIN — Medication SCH ML: at 21:13

## 2017-12-28 RX ADMIN — Medication PRN ML: at 21:18

## 2017-12-28 RX ADMIN — CYANOCOBALAMIN TAB 1000 MCG SCH MCG: 1000 TAB at 09:26

## 2017-12-28 RX ADMIN — Medication SCH ML: at 09:19

## 2017-12-29 LAB
CREAT CL PREDICTED SERPL C-G-VRATE: 173 ML/MIN (ref 70–130)
HGB BLD-MCNC: 8.8 G/DL (ref 12–16)
PLATELET # BLD AUTO: 137 THOU/UL (ref 130–400)
PLATELET # BLD AUTO: 51 THOU/UL (ref 130–400)

## 2017-12-29 RX ADMIN — CYANOCOBALAMIN TAB 1000 MCG SCH MCG: 1000 TAB at 10:01

## 2017-12-29 RX ADMIN — Medication PRN ML: at 05:28

## 2017-12-29 RX ADMIN — Medication SCH ML: at 10:04

## 2017-12-29 RX ADMIN — Medication PRN ML: at 21:55

## 2017-12-29 RX ADMIN — Medication PRN ML: at 13:27

## 2017-12-29 RX ADMIN — VANCOMYCIN HYDROCHLORIDE SCH MLS: 750 INJECTION, POWDER, LYOPHILIZED, FOR SOLUTION INTRAVENOUS at 13:26

## 2017-12-29 RX ADMIN — Medication SCH ML: at 21:55

## 2017-12-30 RX ADMIN — CYANOCOBALAMIN TAB 1000 MCG SCH MCG: 1000 TAB at 09:43

## 2017-12-30 RX ADMIN — Medication PRN ML: at 05:27

## 2017-12-30 RX ADMIN — Medication SCH ML: at 20:38

## 2017-12-30 RX ADMIN — Medication PRN ML: at 01:07

## 2017-12-30 RX ADMIN — Medication PRN ML: at 20:38

## 2017-12-30 RX ADMIN — VANCOMYCIN HYDROCHLORIDE SCH MLS: 750 INJECTION, POWDER, LYOPHILIZED, FOR SOLUTION INTRAVENOUS at 13:23

## 2017-12-30 RX ADMIN — LACTOBACILLUS ACIDOPHILUS / LACTOBACILLUS BULGARICUS SCH GM: 100 MILLION CFU STRENGTH GRANULES at 09:42

## 2017-12-30 RX ADMIN — Medication SCH ML: at 09:57

## 2017-12-30 RX ADMIN — Medication PRN ML: at 13:28

## 2017-12-30 RX ADMIN — VANCOMYCIN HYDROCHLORIDE SCH MLS: 750 INJECTION, POWDER, LYOPHILIZED, FOR SOLUTION INTRAVENOUS at 01:08

## 2017-12-31 LAB
CREAT CL PREDICTED SERPL C-G-VRATE: 71 ML/MIN (ref 70–130)
HGB BLD-MCNC: 8 G/DL (ref 12–16)
PLATELET # BLD AUTO: 154 THOU/UL (ref 130–400)
VANCOMYCIN TROUGH SERPL-MCNC: 24 UG/ML

## 2017-12-31 RX ADMIN — CYANOCOBALAMIN TAB 1000 MCG SCH MCG: 1000 TAB at 10:30

## 2017-12-31 RX ADMIN — Medication SCH ML: at 10:30

## 2017-12-31 RX ADMIN — VANCOMYCIN HYDROCHLORIDE SCH: 750 INJECTION, POWDER, LYOPHILIZED, FOR SOLUTION INTRAVENOUS at 02:06

## 2017-12-31 RX ADMIN — Medication SCH ML: at 21:35

## 2017-12-31 RX ADMIN — Medication PRN ML: at 21:35

## 2017-12-31 RX ADMIN — LACTOBACILLUS ACIDOPHILUS / LACTOBACILLUS BULGARICUS SCH GM: 100 MILLION CFU STRENGTH GRANULES at 10:30

## 2017-12-31 RX ADMIN — Medication PRN ML: at 14:26

## 2018-01-01 RX ADMIN — CYANOCOBALAMIN TAB 1000 MCG SCH MCG: 1000 TAB at 09:15

## 2018-01-01 RX ADMIN — Medication SCH: at 20:27

## 2018-01-01 RX ADMIN — Medication SCH ML: at 09:13

## 2018-01-01 RX ADMIN — LACTOBACILLUS ACIDOPHILUS / LACTOBACILLUS BULGARICUS SCH GM: 100 MILLION CFU STRENGTH GRANULES at 09:16

## 2018-01-02 LAB
CREAT CL PREDICTED SERPL C-G-VRATE: 71 ML/MIN (ref 70–130)
HGB BLD-MCNC: 8.2 G/DL (ref 12–16)
PLATELET # BLD AUTO: 163 THOU/UL (ref 130–400)

## 2018-01-02 RX ADMIN — LACTOBACILLUS ACIDOPHILUS / LACTOBACILLUS BULGARICUS SCH GM: 100 MILLION CFU STRENGTH GRANULES at 10:06

## 2018-01-02 RX ADMIN — Medication SCH: at 23:23

## 2018-01-02 RX ADMIN — Medication SCH: at 10:06

## 2018-01-02 RX ADMIN — CYANOCOBALAMIN TAB 1000 MCG SCH MCG: 1000 TAB at 10:03

## 2018-01-03 RX ADMIN — Medication SCH: at 09:49

## 2018-01-03 RX ADMIN — LACTOBACILLUS ACIDOPHILUS / LACTOBACILLUS BULGARICUS SCH GM: 100 MILLION CFU STRENGTH GRANULES at 09:45

## 2018-01-03 RX ADMIN — CYANOCOBALAMIN TAB 1000 MCG SCH MCG: 1000 TAB at 09:48

## 2018-01-03 RX ADMIN — Medication SCH: at 22:56

## 2018-01-04 LAB
CREAT CL PREDICTED SERPL C-G-VRATE: 70 ML/MIN (ref 70–130)
HGB BLD-MCNC: 8.7 G/DL (ref 12–16)
PLATELET # BLD AUTO: 193 THOU/UL (ref 130–400)

## 2018-01-04 RX ADMIN — Medication SCH: at 08:21

## 2018-01-04 RX ADMIN — CYANOCOBALAMIN TAB 1000 MCG SCH MCG: 1000 TAB at 08:21

## 2018-01-04 RX ADMIN — Medication SCH: at 20:23

## 2018-01-04 RX ADMIN — LACTOBACILLUS ACIDOPHILUS / LACTOBACILLUS BULGARICUS SCH GM: 100 MILLION CFU STRENGTH GRANULES at 08:21

## 2018-01-05 RX ADMIN — Medication SCH: at 21:55

## 2018-01-05 RX ADMIN — CYANOCOBALAMIN TAB 1000 MCG SCH MCG: 1000 TAB at 09:55

## 2018-01-05 RX ADMIN — Medication SCH: at 10:00

## 2018-01-05 RX ADMIN — LACTOBACILLUS ACIDOPHILUS / LACTOBACILLUS BULGARICUS SCH GM: 100 MILLION CFU STRENGTH GRANULES at 09:59

## 2018-01-06 LAB
CREAT CL PREDICTED SERPL C-G-VRATE: 67 ML/MIN (ref 70–130)
HGB BLD-MCNC: 8.5 G/DL (ref 12–16)
PLATELET # BLD AUTO: 208 THOU/UL (ref 130–400)

## 2018-01-06 RX ADMIN — CYANOCOBALAMIN TAB 1000 MCG SCH MCG: 1000 TAB at 09:02

## 2018-01-06 RX ADMIN — Medication SCH: at 09:14

## 2018-01-06 RX ADMIN — LACTOBACILLUS ACIDOPHILUS / LACTOBACILLUS BULGARICUS SCH GM: 100 MILLION CFU STRENGTH GRANULES at 09:02

## 2018-01-06 RX ADMIN — Medication SCH: at 23:13

## 2018-01-07 RX ADMIN — CYANOCOBALAMIN TAB 1000 MCG SCH MCG: 1000 TAB at 08:12

## 2018-01-07 RX ADMIN — LACTOBACILLUS ACIDOPHILUS / LACTOBACILLUS BULGARICUS SCH GM: 100 MILLION CFU STRENGTH GRANULES at 08:12

## 2018-01-08 VITALS — DIASTOLIC BLOOD PRESSURE: 54 MMHG | SYSTOLIC BLOOD PRESSURE: 119 MMHG

## 2018-01-08 VITALS — TEMPERATURE: 98.3 F

## 2018-01-08 LAB
CREAT CL PREDICTED SERPL C-G-VRATE: 75 ML/MIN (ref 70–130)
HGB BLD-MCNC: 9.4 G/DL (ref 12–16)
PLATELET # BLD AUTO: 267 THOU/UL (ref 130–400)

## 2018-01-08 RX ADMIN — LACTOBACILLUS ACIDOPHILUS / LACTOBACILLUS BULGARICUS SCH GM: 100 MILLION CFU STRENGTH GRANULES at 09:16

## 2018-01-08 RX ADMIN — CYANOCOBALAMIN TAB 1000 MCG SCH MCG: 1000 TAB at 09:16

## 2018-01-09 ENCOUNTER — HOSPITAL ENCOUNTER (OUTPATIENT)
Dept: HOSPITAL 92 - ERS | Age: 83
Setting detail: OBSERVATION
LOS: 1 days | Discharge: NURSING, REHAB OR LONG-TERM CARE | End: 2018-01-10
Attending: INTERNAL MEDICINE | Admitting: INTERNAL MEDICINE
Payer: MEDICARE

## 2018-01-09 ENCOUNTER — HOSPITAL ENCOUNTER (EMERGENCY)
Dept: HOSPITAL 57 - BURERS | Age: 83
Discharge: ANOTHER HOSPITAL | End: 2018-01-09
Payer: MEDICARE

## 2018-01-09 VITALS — BODY MASS INDEX: 41.8 KG/M2

## 2018-01-09 DIAGNOSIS — Z88.8: ICD-10-CM

## 2018-01-09 DIAGNOSIS — Z86.73: ICD-10-CM

## 2018-01-09 DIAGNOSIS — Z79.01: ICD-10-CM

## 2018-01-09 DIAGNOSIS — I50.9: ICD-10-CM

## 2018-01-09 DIAGNOSIS — I11.0: ICD-10-CM

## 2018-01-09 DIAGNOSIS — M19.90: ICD-10-CM

## 2018-01-09 DIAGNOSIS — E66.9: ICD-10-CM

## 2018-01-09 DIAGNOSIS — F32.9: ICD-10-CM

## 2018-01-09 DIAGNOSIS — I16.0: Primary | ICD-10-CM

## 2018-01-09 DIAGNOSIS — R53.81: ICD-10-CM

## 2018-01-09 DIAGNOSIS — R74.8: ICD-10-CM

## 2018-01-09 DIAGNOSIS — Z79.899: ICD-10-CM

## 2018-01-09 DIAGNOSIS — Z98.890: ICD-10-CM

## 2018-01-09 DIAGNOSIS — G47.33: ICD-10-CM

## 2018-01-09 DIAGNOSIS — Z96.0: ICD-10-CM

## 2018-01-09 DIAGNOSIS — R41.82: Primary | ICD-10-CM

## 2018-01-09 DIAGNOSIS — Z87.440: ICD-10-CM

## 2018-01-09 DIAGNOSIS — G25.0: ICD-10-CM

## 2018-01-09 DIAGNOSIS — Z88.7: ICD-10-CM

## 2018-01-09 DIAGNOSIS — F41.9: ICD-10-CM

## 2018-01-09 DIAGNOSIS — Z96.653: ICD-10-CM

## 2018-01-09 DIAGNOSIS — I12.9: ICD-10-CM

## 2018-01-09 DIAGNOSIS — Z90.49: ICD-10-CM

## 2018-01-09 DIAGNOSIS — Z79.82: ICD-10-CM

## 2018-01-09 DIAGNOSIS — Z91.041: ICD-10-CM

## 2018-01-09 DIAGNOSIS — N39.0: ICD-10-CM

## 2018-01-09 DIAGNOSIS — N18.3: ICD-10-CM

## 2018-01-09 DIAGNOSIS — J45.909: ICD-10-CM

## 2018-01-09 LAB
ALBUMIN SERPL BCG-MCNC: 2.8 G/DL (ref 3.4–4.8)
ALP SERPL-CCNC: 87 U/L (ref 40–150)
ALT SERPL W P-5'-P-CCNC: 10 U/L (ref 8–55)
ANION GAP SERPL CALC-SCNC: 13 MMOL/L (ref 10–20)
APAP SERPL-MCNC: (no result) MCG/ML (ref 10–30)
AST SERPL-CCNC: 13 U/L (ref 5–34)
BACTERIA UR QL AUTO: (no result) HPF
BASOPHILS # BLD AUTO: 0.1 THOU/UL (ref 0–0.2)
BASOPHILS NFR BLD AUTO: 0.8 % (ref 0–1)
BILIRUB SERPL-MCNC: 0.3 MG/DL (ref 0.2–1.2)
BUN SERPL-MCNC: 22 MG/DL (ref 9.8–20.1)
CALCIUM SERPL-MCNC: 9.4 MG/DL (ref 7.8–10.44)
CHLORIDE SERPL-SCNC: 93 MMOL/L (ref 98–107)
CK MB SERPL-MCNC: 0.6 NG/ML (ref 0–6.6)
CK SERPL-CCNC: 21 U/L (ref 29–168)
CO2 SERPL-SCNC: 32 MMOL/L (ref 23–31)
CREAT CL PREDICTED SERPL C-G-VRATE: 72 ML/MIN (ref 70–130)
CRYSTAL-AUWI FLAG: 0 (ref 0–15)
DRUG SCREEN CUTOFF: (no result)
EOSINOPHIL # BLD AUTO: 0.2 THOU/UL (ref 0–0.7)
EOSINOPHIL NFR BLD AUTO: 3.1 % (ref 0–10)
GLOBULIN SER CALC-MCNC: 3.1 G/DL (ref 2.4–3.5)
GLUCOSE SERPL-MCNC: 102 MG/DL (ref 83–110)
HEV IGM SER QL: 4.2 (ref 0–7.99)
HGB BLD-MCNC: 8.9 G/DL (ref 12–16)
HGB BLD-MCNC: 9.9 G/DL (ref 12–16)
HYALINE CASTS #/AREA URNS LPF: (no result) LPF
INR PPP: 1.1
LYMPHOCYTES # BLD: 1.6 THOU/UL (ref 1.2–3.4)
LYMPHOCYTES NFR BLD AUTO: 23.8 % (ref 21–51)
MACROCYTES BLD QL SMEAR: (no result) (100X)
MCH RBC QN AUTO: 31.8 PG (ref 27–31)
MCH RBC QN AUTO: 33.1 PG (ref 27–31)
MCV RBC AUTO: 102 FL (ref 81–99)
MCV RBC AUTO: 105 FL (ref 81–99)
MDIFF COMPLETE?: YES
MEDTOX CONTROL LINE VALID?: (no result)
MONOCYTES # BLD AUTO: 0.6 THOU/UL (ref 0.11–0.59)
MONOCYTES NFR BLD AUTO: 9.3 % (ref 0–10)
NEUTROPHILS # BLD AUTO: 4.1 THOU/UL (ref 1.4–6.5)
NEUTROPHILS NFR BLD AUTO: 63 % (ref 42–75)
PATHC CAST-AUWI FLAG: 0.67 (ref 0–2.49)
PLATELET # BLD AUTO: 289 THOU/UL (ref 130–400)
PLATELET # BLD AUTO: 320 THOU/UL (ref 130–400)
PLATELET BLD QL SMEAR: (no result)
POTASSIUM SERPL-SCNC: 4.2 MMOL/L (ref 3.5–5.1)
PROT UR STRIP.AUTO-MCNC: 100 MG/DL
PROT UR STRIP.AUTO-MCNC: 100 MG/DL
PROTHROMBIN TIME: 14.1 SEC (ref 12–14.7)
RBC # BLD AUTO: 2.8 MILL/UL (ref 4.2–5.4)
RBC # BLD AUTO: 2.98 MILL/UL (ref 4.2–5.4)
RBC UR QL AUTO: (no result) HPF (ref 0–3)
SALICYLATES SERPL-MCNC: (no result) MG/DL (ref 15–30)
SODIUM SERPL-SCNC: 134 MMOL/L (ref 136–145)
SP GR UR STRIP: 1.01 (ref 1–1.03)
SP GR UR STRIP: 1.01 (ref 1–1.04)
SPERM-AUWI FLAG: 0 (ref 0–9.9)
TROPONIN I SERPL DL<=0.01 NG/ML-MCNC: 0.04 NG/ML (ref ?–0.03)
TROPONIN I SERPL DL<=0.01 NG/ML-MCNC: 0.05 NG/ML (ref ?–0.03)
UNIDENT CRYS #/AREA URNS HPF: (no result) HPF
WBC # BLD AUTO: 6.6 THOU/UL (ref 4.8–10.8)
WBC # BLD AUTO: 7.6 THOU/UL (ref 4.8–10.8)
WBC UR QL AUTO: (no result) HPF (ref 0–3)
WBC UR QL AUTO: (no result) HPF (ref 0–3)
YEAST FLD HPF-#/AREA: (no result) HPF
YEAST FLD HPF-#/AREA: (no result) HPF
YEAST-AUWI FLAG: 407.9 (ref 0–25)

## 2018-01-09 PROCEDURE — 82805 BLOOD GASES W/O2 SATURATION: CPT

## 2018-01-09 PROCEDURE — 84443 ASSAY THYROID STIM HORMONE: CPT

## 2018-01-09 PROCEDURE — 71045 X-RAY EXAM CHEST 1 VIEW: CPT

## 2018-01-09 PROCEDURE — 84484 ASSAY OF TROPONIN QUANT: CPT

## 2018-01-09 PROCEDURE — 80306 DRUG TEST PRSMV INSTRMNT: CPT

## 2018-01-09 PROCEDURE — 36415 COLL VENOUS BLD VENIPUNCTURE: CPT

## 2018-01-09 PROCEDURE — G0378 HOSPITAL OBSERVATION PER HR: HCPCS

## 2018-01-09 PROCEDURE — 96375 TX/PRO/DX INJ NEW DRUG ADDON: CPT

## 2018-01-09 PROCEDURE — A4216 STERILE WATER/SALINE, 10 ML: HCPCS

## 2018-01-09 PROCEDURE — 36416 COLLJ CAPILLARY BLOOD SPEC: CPT

## 2018-01-09 PROCEDURE — 96374 THER/PROPH/DIAG INJ IV PUSH: CPT

## 2018-01-09 PROCEDURE — 81003 URINALYSIS AUTO W/O SCOPE: CPT

## 2018-01-09 PROCEDURE — 80307 DRUG TEST PRSMV CHEM ANLYZR: CPT

## 2018-01-09 PROCEDURE — 82140 ASSAY OF AMMONIA: CPT

## 2018-01-09 PROCEDURE — 87086 URINE CULTURE/COLONY COUNT: CPT

## 2018-01-09 PROCEDURE — 85025 COMPLETE CBC W/AUTO DIFF WBC: CPT

## 2018-01-09 PROCEDURE — 83690 ASSAY OF LIPASE: CPT

## 2018-01-09 PROCEDURE — 94760 N-INVAS EAR/PLS OXIMETRY 1: CPT

## 2018-01-09 PROCEDURE — 94640 AIRWAY INHALATION TREATMENT: CPT

## 2018-01-09 PROCEDURE — 93005 ELECTROCARDIOGRAM TRACING: CPT

## 2018-01-09 PROCEDURE — 70450 CT HEAD/BRAIN W/O DYE: CPT

## 2018-01-09 PROCEDURE — 81015 MICROSCOPIC EXAM OF URINE: CPT

## 2018-01-09 PROCEDURE — 87040 BLOOD CULTURE FOR BACTERIA: CPT

## 2018-01-09 PROCEDURE — 99285 EMERGENCY DEPT VISIT HI MDM: CPT

## 2018-01-09 PROCEDURE — 80053 COMPREHEN METABOLIC PANEL: CPT

## 2018-01-09 NOTE — RAD
CHEST ONE VIEW:

 

History: Altered mental status. 

 

Comparison: 12-25-17

 

FINDINGS: 

Cardiac silhouette is magnified and enlarged. Pulmonary vasculature are slightly engorged with patchy
 bibasilar infiltrates similar in appearance to the previous study. Mediastinum is midline. Motion ar
tifact obscured detail. There are post-operative changes of the right shoulder. 

 

IMPRESSION: 

1. Cardiomegaly, borderline pulmonary vascular congestion, and patchy bibasilar infiltrates are stabl
e. 

 

POS: [facility]

## 2018-01-09 NOTE — CT
PRELIMINARY REPORT/[facility]/EMERGENCY AFTER

HOURS PROCEDURE:

 

EXAM:

CT Head Without Intravenous Contrast

 

CLINICAL HISTORY:

83 years old, female; Signs and symptoms; Altered mental status/memory loss; Confusion or disorientat
ion; Patient HX: AMS

 

TECHNIQUE:

Axial computed tomography images of the head/brain without intravenous contrast. All CT scans at this
 facility use one or more dose reduction techniques, viz.: automated exposure control; ma/kV adjustme
nt per patient size (including targeted exams where dose is matched to indication; i.e. head); or ite
rative reconstruction technique.

 

COMPARISON:

No relevant prior studies available.

 

FINDINGS:

Mildly limited due to streak artifact

Brain: Mild volume loss No hemorrhage. No significant white matter disease. No edema.

Ventricles: Unremarkable. No ventriculomegaly.

Bones/joints: Unremarkable. No acute fracture.

Soft tissues: Unremarkable.

Sinuses: Unremarkable as visualized. No acute sinusitis.

Mastoid air cells: Unremarkable as visualized. No mastoid effusion.

 

IMPRESSION:

No intracranial hemorrhage.Please see discussion above.

 

Thank you for allowing us to participate in the care of your patient.

 

Dictated and Authenticated by: Tyrone Byoer MD

01/09/2018 7:53 AM Central Time ([place] & [place])

 

 

FINAL REPORT

CT HEAD NONCONTRAST:

 

Date: 1-9-18 

 

Performed on emergency basis at 0719 hours. 

 

History: Altered mental status. 

 

Comparison: 12-6-17

 

FINDINGS: 

Findings agree with the preliminary report by [facility]. Chronic type findings are stable. No
 acute intracranial abnormalities are demonstrated on noncontrast CT head. 

 

POS: [facility]

## 2018-01-09 NOTE — HP
PRIMARY CARE PROVIDER:  Dr. Avila, doctor of record currently Dr. Marvin.

 

HISTORY OF PRESENT ILLNESS:  She is in the [facility] skilled nursing.  She was referred to the [facility] Emergency Department from the [facility] this morning, the nursing staff co
kandy not wake her up, she woke up slowly.  She is now back to normal.  The major concern right now is 
her elevated blood pressure.  She said she did have some headache and dizziness this morning, but has
 none now.  She had no double vision, no focal weakness.

 

PAST MEDICAL HISTORY:  Extensive.  She has had recurrent urinary tract infections, currently has an i
ndwelling Hinds catheter.  She has a history of sleep apnea, but has refused CPAP, history of chronic
 kidney disease, progressive deconditioning.  She has not walked for a month now.  She has a history 
of a cerebrovascular accident in the past, hypertension, obesity, osteoarthritis, a tremor, anxiety a
nd depression.

 

PAST SURGICAL HISTORY:  Includes hysterectomy, cholecystectomy, bilateral total knee replacements and
 right shoulder surgery.

 

CURRENT MEDICATIONS:  Aspirin 81 mg a day, clonidine 0.1 mg p.o. p.r.n., duloxetine 60 mg a day, losa
rtan 100 mg a day, primidone 250 mg twice a day, spironolactone 25 mg a day, amlodipine 5 mg a day, C
oreg 6.25 mg twice a day, clonidine 0.2 mg 3 times a day, Pepcid 20 mg once a day, Xopenex HFA 2 puff
s q.4 hours as needed, Lipitor 10 mg a day, hydrochlorothiazide 25 mg a day and allopurinol 300 mg a 
day.

 

ALLERGIES:  She is allergic to IODINE CONTRAST and also ZOSTER VACCINE.

 

FAMILY HISTORY:  No strong family history of coronary artery disease, CVA or cancer.  She does have f
amily history of hypertension.

 

SOCIAL HISTORY:  Has occasional alcoholic beverage.  No smoking, no illicit drug use.  .

 

CODE STATUS:  FULL CODE status.   is the surrogate decision maker.

 

REVIEW OF SYSTEMS:  General:  Currently, no headaches or dizziness.  No fever, sweats or chills.  Eye
s:  No double vision, blurred vision or flashing lights.  ENT:  No ear pain or drainage.  No nasal bl
eeding.  No trouble swallowing.  Cardiac:  No chest pain, orthopnea or paroxysmal nocturnal dyspnea. 
 Respiratory:  No cough, wheezing or asthma.  Gastrointestinal:  No nausea, vomiting, abdominal pain 
or diarrhea.  Genitourinary:  Has an indwelling Hinds catheter.  No pain.  No blood in her urine.  Mu
sculoskeletal:  She states her legs, feet and knees hurt all the time that she swells all the time.  
Neurologic:  History of a stroke.  No definite residual.   Psychiatric:  Anxiety and depression, on m
edications.  Skin:  No bruising, bleeding or rash.  Heme/Lymph:  No tender or swollen lymph nodes in 
axilla, inguinal or cervical area.

 

PHYSICAL EXAMINATION:

GENERAL:  Alert, oriented and cooperative.

VITAL SIGNS:  Blood pressure 250/97, pulse 88, respirations 18 and O2 sat 96% on room air.

HEENT:  Pupils are equal, round, and reactive to light with implants.  Extraocular movements are inta
ct.  Sclerae white.  Tympanic membranes are clear.  Nose is clear.  Oral mucous membranes are wet.

NECK:  No jugular venous distention, adenopathy or thyromegaly.

CHEST:  Clear to auscultation and percussion.  She does have distant breath sounds, most likely relat
ed to her body habitus.

HEART:  Regular rate and rhythm.  First and second heart sounds are clear.  No murmurs or gallops.

ABDOMEN:  Soft.  Bowel sounds are normal.  No hepatosplenomegaly, no mass, no rebound, no bruits.

EXTREMITIES:  No cyanosis or clubbing.  She has 3+ edema in her legs from the knee down.

PULSES:  Carotid, radial, femoral pulses palpable and symmetric.  Pedal pulses not palpable.

SKIN:  Warm and dry without bruises or rash.

HEME/LYMPH:  No tender or swollen lymph nodes in axilla, inguinal or cervical area.

NEUROLOGICAL:  Cranial nerves II-XII are intact.  She has essential tremor in both arms.  Finger-nose
-finger is grossly normal on both arms.   strength is normal on both forearms.  Toes are downgoin
g bilaterally.  Moves all extremities.

 

IMAGING DATA:  Chest x-ray:  No cardiomegaly, CHF or infiltrate.  Possibility exist of bilateral smal
l pleural effusions; however, it is difficult because of body habitus, reviewed by me.  CT of the bra
in, no acute intracranial process.  It is pertinent that this patient has had multiple MRIs, the last
 one done on 11/27/2017 shows no acute stroke, etc.  She has some atrophy.  She had carotid Dopplers 
done on 11/17/2017, which shows no hemodynamically significant stenosis.

 

LABORATORY DATA:  None done in our emergency room at this facility; however, in [place], she had a c
reatinine kinase of 21, ammonia of 25, troponin 0.035, TSH 7.6 and lipase 14.  Toxicology showed coral
iturates consistent with her primidone therapy.

 

ASSESSMENT AND PLAN:  Altered mental status, apparently difficult to awaken this morning.  It is pert
inent.  She is taking a barbiturate for tremors and she also has obstructive sleep apnea for which 
e is not cooperative with CPAP.  Having reviewed her multiple neurological workups in the recent past
, I suspect that these are the reasons for this altered mental status this morning, which has complet
ely resolved.  I have ordered atrial blood gas to see if she is a CO2 retainer.  I have ordered CBC a
nd comp metabolic profile.  We will continue her home medicines.  I suspect her blood pressure is up 
because she has missed multiple medications.  We will use p.r.n.  Apresoline and labetalol in the stephanie
ntime.

## 2018-01-10 ENCOUNTER — HOSPITAL ENCOUNTER (INPATIENT)
Dept: HOSPITAL 57 - BURMED | Age: 83
LOS: 23 days | Discharge: HOME HEALTH | DRG: 948 | End: 2018-02-02
Attending: FAMILY MEDICINE | Admitting: FAMILY MEDICINE
Payer: MEDICARE

## 2018-01-10 VITALS — SYSTOLIC BLOOD PRESSURE: 141 MMHG | DIASTOLIC BLOOD PRESSURE: 65 MMHG

## 2018-01-10 VITALS — BODY MASS INDEX: 38.6 KG/M2

## 2018-01-10 VITALS — TEMPERATURE: 98.7 F

## 2018-01-10 DIAGNOSIS — R53.1: Primary | ICD-10-CM

## 2018-01-10 DIAGNOSIS — G47.30: ICD-10-CM

## 2018-01-10 DIAGNOSIS — R32: ICD-10-CM

## 2018-01-10 DIAGNOSIS — E66.2: ICD-10-CM

## 2018-01-10 DIAGNOSIS — I11.0: ICD-10-CM

## 2018-01-10 DIAGNOSIS — M19.90: ICD-10-CM

## 2018-01-10 DIAGNOSIS — I50.9: ICD-10-CM

## 2018-01-10 DIAGNOSIS — Z86.73: ICD-10-CM

## 2018-01-10 LAB
ALBUMIN SERPL BCG-MCNC: 2.9 G/DL (ref 3.4–4.8)
ALP SERPL-CCNC: 88 U/L (ref 40–150)
ALT SERPL W P-5'-P-CCNC: 11 U/L (ref 8–55)
ANALYZER IN CARDIO: (no result)
ANION GAP SERPL CALC-SCNC: 11 MMOL/L (ref 10–20)
AST SERPL-CCNC: 16 U/L (ref 5–34)
BASE EXCESS STD BLDA CALC-SCNC: 9.9 MEQ/L
BASOPHILS # BLD AUTO: 0.1 THOU/UL (ref 0–0.2)
BASOPHILS NFR BLD AUTO: 0.8 % (ref 0–1)
BILIRUB SERPL-MCNC: 0.4 MG/DL (ref 0.2–1.2)
BUN SERPL-MCNC: 18 MG/DL (ref 9.8–20.1)
CA-I BLDA-SCNC: 1.3 MMOL/L (ref 1.12–1.3)
CALCIUM SERPL-MCNC: 10 MG/DL (ref 7.8–10.44)
CHLORIDE SERPL-SCNC: 94 MMOL/L (ref 98–107)
CO2 SERPL-SCNC: 34 MMOL/L (ref 23–31)
CREAT CL PREDICTED SERPL C-G-VRATE: 93 ML/MIN (ref 70–130)
EOSINOPHIL # BLD AUTO: 0.3 THOU/UL (ref 0–0.7)
EOSINOPHIL NFR BLD AUTO: 4.4 % (ref 0–10)
GLOBULIN SER CALC-MCNC: 2.9 G/DL (ref 2.4–3.5)
GLUCOSE SERPL-MCNC: 116 MG/DL (ref 83–110)
HCO3 BLDA-SCNC: 34.8 MEQ/L (ref 22–26)
HCT VFR BLDA CALC: 31.4 % (ref 36–47)
HGB BLD-MCNC: 9.8 G/DL (ref 12–16)
HGB BLDA-MCNC: 9.1 G/DL (ref 12–16)
LYMPHOCYTES # BLD: 1.5 THOU/UL (ref 1.2–3.4)
LYMPHOCYTES NFR BLD AUTO: 22.7 % (ref 21–51)
MCH RBC QN AUTO: 34.3 PG (ref 27–31)
MCV RBC AUTO: 104 FL (ref 81–99)
MONOCYTES # BLD AUTO: 0.6 THOU/UL (ref 0.11–0.59)
MONOCYTES NFR BLD AUTO: 9.1 % (ref 0–10)
NEUTROPHILS # BLD AUTO: 4.3 THOU/UL (ref 1.4–6.5)
NEUTROPHILS NFR BLD AUTO: 62.9 % (ref 42–75)
PCO2 BLDA: 49.6 MMHG (ref 35–45)
PH BLDA: 7.46 [PH] (ref 7.35–7.45)
PLATELET # BLD AUTO: 333 THOU/UL (ref 130–400)
PO2 BLDA: 55.2 MMHG (ref 80–100)
POTASSIUM SERPL-SCNC: 4.2 MMOL/L (ref 3.5–5.1)
RBC # BLD AUTO: 2.85 MILL/UL (ref 4.2–5.4)
SODIUM SERPL-SCNC: 135 MMOL/L (ref 136–145)
SPECIMEN DRAWN FROM PATIENT: (no result)
WBC # BLD AUTO: 6.8 THOU/UL (ref 4.8–10.8)

## 2018-01-10 PROCEDURE — 80048 BASIC METABOLIC PNL TOTAL CA: CPT

## 2018-01-10 PROCEDURE — 85025 COMPLETE CBC W/AUTO DIFF WBC: CPT

## 2018-01-10 PROCEDURE — 85049 AUTOMATED PLATELET COUNT: CPT

## 2018-01-10 PROCEDURE — 87086 URINE CULTURE/COLONY COUNT: CPT

## 2018-01-10 PROCEDURE — 36415 COLL VENOUS BLD VENIPUNCTURE: CPT

## 2018-01-10 PROCEDURE — 85018 HEMOGLOBIN: CPT

## 2018-01-10 PROCEDURE — 71046 X-RAY EXAM CHEST 2 VIEWS: CPT

## 2018-01-10 PROCEDURE — 94640 AIRWAY INHALATION TREATMENT: CPT

## 2018-01-10 PROCEDURE — 80053 COMPREHEN METABOLIC PANEL: CPT

## 2018-01-10 PROCEDURE — 81001 URINALYSIS AUTO W/SCOPE: CPT

## 2018-01-10 PROCEDURE — A4353 INTERMITTENT URINARY CATH: HCPCS

## 2018-01-10 PROCEDURE — 85014 HEMATOCRIT: CPT

## 2018-01-10 PROCEDURE — 82565 ASSAY OF CREATININE: CPT

## 2018-01-10 NOTE — PDOC.PN
- Subjective


Encounter Start Date: 01/10/18


Encounter Start Time: 12:14





Ms. Leach was seen in follow-up today. She is breathing much better. She is 

now at her baseline level of functioning. Her daughter is at the bedside.





- Objective


Resuscitation Status: 


 











Resuscitation Status           FULL:Full Resuscitation














MAR Reviewed: Yes


Vital Signs & Weight: 


 Vital Signs (12 hours)











  Temp Pulse Resp BP Pulse Ox


 


 01/10/18 08:00  98.7 F  96  18  


 


 01/10/18 07:38  98.8 F  96  24 H  189/91 H  92 L


 


 01/10/18 03:25  98.7 F  96  18  163/84 H  93 L








 Weight











Weight                         267 lb 4.8 oz














I&O: 


 











 01/09/18 01/10/18 01/11/18





 06:59 06:59 06:59


 


Intake Total  300 240


 


Output Total  750 


 


Balance  -450 240











Result Diagrams: 


 01/10/18 04:35





 01/10/18 04:35





Phys Exam





- Physical Examination


HEENT: PERRLA


Respiratory: wheezing present


+ mild expiratory wheeze


Cardiovascular: RRR, no significant murmur


Gastrointestinal: soft, non-tender, positive bowel sounds


Musculoskeletal: no edema





Dx/Plan


(1) Altered mental status


Code(s): R41.82 - ALTERED MENTAL STATUS, UNSPECIFIED   Status: Acute   





(2) Hypertension


Code(s): I10 - ESSENTIAL (PRIMARY) HYPERTENSION   Status: Acute   





(3) CKD (chronic kidney disease) stage 3, GFR 30-59 ml/min


Code(s): N18.3 - CHRONIC KIDNEY DISEASE, STAGE 3 (MODERATE)   Status: Chronic   

Comment: GFR up over 60 now   





(4) HTN (hypertension)


Code(s): I10 - ESSENTIAL (PRIMARY) HYPERTENSION   Status: Chronic   


Qualifiers: 


   Hypertension type: essential hypertension   Qualified Code(s): I10 - 

Essential (primary) hypertension   


Comment: better controlled   





(5) Physical deconditioning


Code(s): R53.81 - OTHER MALAISE   Status: Acute   Comment: to rehab soon, LTAC 

denied.  Will discuss with daughter need for SNF.  will begin process.  she is 

adamant about trying appelaing LTAC and then IPR first.  Referral made by [facility] 12/

15.  Pt has no acute care needs, i do not see any way of getting an LTAC 

approved.  Family agreeable to SNF and have selected [facility] 

bed.  expect approval, d/C when accepted,   





(6) Obesity


Code(s): E66.9 - OBESITY, UNSPECIFIED   Status: Chronic   


Qualifiers: 


   Obesity classification: adult class 3 (BMI >= 40)   Body mass index: BMI 40.0

-44.9 





- Plan





* Altered mental status- discussed with the patient and the patient's family- 

Suspect it is multifactoral, due to Obesty Hypoventilation, and Obesity. Will 

try to obtain the results of her previous sleep study and have her return to 

Swing bed in [place] on CPAP


* HTN- non optimally controlled


* Chronic kidney disease- improved- this appears baseline or even improved


* Hopefully back to NH if arrangements can be made for Obesity Hypoventillation.

## 2018-01-11 RX ADMIN — CYANOCOBALAMIN TAB 1000 MCG SCH MCG: 1000 TAB at 08:29

## 2018-01-11 RX ADMIN — LACTOBACILLUS ACIDOPHILUS / LACTOBACILLUS BULGARICUS SCH GM: 100 MILLION CFU STRENGTH GRANULES at 08:31

## 2018-01-11 NOTE — DIS
DATE OF ADMISSION:  01/09/2018

 

DATE OF DISCHARGE:  01/10/2018

 

PRIMARY CARE PHYSICIAN:  Yennifer Marvin M.D.

 

DISCHARGE DISPOSITION:  Back to the skilled nursing facility in [place].

 

DISCHARGE DIAGNOSES:

1.  Probable obesity hypoventilation syndrome.

2.  Altered mental status secondary to probable obesity hypoventilation syndrome.

3.  Hypertension.

4.  History of previous cerebrovascular accident.

5.  Obesity.

6.  Osteoarthritis.

7.  Essential tremor.

 

DISCHARGE MEDICATIONS:  Include tramadol 50 mg q.4 hours p.r.n., senna 2 tablets at bedtime, Zofran 4
 mg q.6 h. as needed, minoxidil 2.5 mg daily, Milk of Magnesia 30 mL p.o. daily, latanoprost eyedrops
 to each eye at bedtime, Imdur 60 mg daily, Lasix 40 mg daily, folic acid 1 mg daily, iron sulfate 32
5 mg daily, famotidine 20 mg twice a day, Lovenox 40 mg subcu daily, Cymbalta 60 mg daily, Colace 100
 mg twice a day, vitamin B12 1000 mcg p.o. daily, clonidine 0.1 mg p.o. as needed, Coreg 25 mg twice 
a day, Dulcolax 10 mg per rectum daily, Lipitor 10 mg at bedtime, aspirin 325 mg daily, Norvasc 10 mg
 at bedtime, allopurinol 300 mg daily, albuterol nebs q.8 hours as needed, Floranex 1 gram daily, and
 Tylenol 650 mg q.4 hours as needed.

 

PROCEDURES DONE DURING ADMISSION:  The patient had a CT scan of the brain, which was negative for any
 acute intracranial abnormalities.

 

CODE STATUS:  FULL CODE.

 

ALLERGIES:  IODINE, PRIMIDONE, and the ZOSTER VACCINE.

 

HOSPITAL COURSE:  Ms. Leach is a pleasant 83-year-old female who was sent over from the [facility] after she was found to be lethargic and difficult to arouse.  By the time she a
ctually reached our facility, she was back to her baseline.  It is also noted that she had a fairly e
xtensive workup just recently for similar symptoms.  It is felt that the lethargy and difficulty to a
wake the patient was likely as a result of obesity hypoventilation syndrome.  The patient has had thi
s problem before in the past and has been on CPAP; however, she had lost weight in the interim from a
 previous admission and the CPAP had been discontinued, but since then, she has gained weight once ag
ain and has become deconditioned and I suspect this is likely the cause of her symptoms on this occas
ion.  She will be sent back to the [facility] bed on [facility] until which time the patient can be 
evaluated by an outpatient sleep study to diagnose the appropriate setting.  Her home medications jose l
l continue.  It is re-emphasized that she should avoid sedating medications such as clonidine and sammy
midone which has caused similar symptoms in the past and she is subsequently being discharged back to
 the [facility] bed today.

## 2018-01-12 LAB
CREAT CL PREDICTED SERPL C-G-VRATE: 78 ML/MIN (ref 70–130)
HGB BLD-MCNC: 8.6 G/DL (ref 12–16)
PLATELET # BLD AUTO: 286 THOU/UL (ref 130–400)

## 2018-01-12 RX ADMIN — LACTOBACILLUS ACIDOPHILUS / LACTOBACILLUS BULGARICUS SCH GM: 100 MILLION CFU STRENGTH GRANULES at 09:45

## 2018-01-12 RX ADMIN — CYANOCOBALAMIN TAB 1000 MCG SCH MCG: 1000 TAB at 09:45

## 2018-01-13 RX ADMIN — CYANOCOBALAMIN TAB 1000 MCG SCH MCG: 1000 TAB at 08:46

## 2018-01-13 RX ADMIN — LACTOBACILLUS ACIDOPHILUS / LACTOBACILLUS BULGARICUS SCH GM: 100 MILLION CFU STRENGTH GRANULES at 08:46

## 2018-01-14 LAB
CREAT CL PREDICTED SERPL C-G-VRATE: 90 ML/MIN (ref 70–130)
HGB BLD-MCNC: 9.1 G/DL (ref 12–16)
PLATELET # BLD AUTO: 288 THOU/UL (ref 130–400)

## 2018-01-14 RX ADMIN — LACTOBACILLUS ACIDOPHILUS / LACTOBACILLUS BULGARICUS SCH GM: 100 MILLION CFU STRENGTH GRANULES at 08:32

## 2018-01-14 RX ADMIN — CYANOCOBALAMIN TAB 1000 MCG SCH MCG: 1000 TAB at 08:34

## 2018-01-15 RX ADMIN — LACTOBACILLUS ACIDOPHILUS / LACTOBACILLUS BULGARICUS SCH GM: 100 MILLION CFU STRENGTH GRANULES at 09:46

## 2018-01-15 RX ADMIN — CYANOCOBALAMIN TAB 1000 MCG SCH MCG: 1000 TAB at 09:45

## 2018-01-16 LAB
CREAT CL PREDICTED SERPL C-G-VRATE: 98 ML/MIN (ref 70–130)
HGB BLD-MCNC: 8.4 G/DL (ref 12–16)
PLATELET # BLD AUTO: 235 THOU/UL (ref 130–400)

## 2018-01-16 RX ADMIN — LACTOBACILLUS ACIDOPHILUS / LACTOBACILLUS BULGARICUS SCH GM: 100 MILLION CFU STRENGTH GRANULES at 09:18

## 2018-01-16 RX ADMIN — CYANOCOBALAMIN TAB 1000 MCG SCH MCG: 1000 TAB at 09:18

## 2018-01-17 RX ADMIN — CYANOCOBALAMIN TAB 1000 MCG SCH MCG: 1000 TAB at 08:49

## 2018-01-17 RX ADMIN — LACTOBACILLUS ACIDOPHILUS / LACTOBACILLUS BULGARICUS SCH GM: 100 MILLION CFU STRENGTH GRANULES at 08:50

## 2018-01-18 LAB
CREAT CL PREDICTED SERPL C-G-VRATE: 94 ML/MIN (ref 70–130)
HGB BLD-MCNC: 8.2 G/DL (ref 12–16)
PLATELET # BLD AUTO: 210 THOU/UL (ref 130–400)

## 2018-01-18 RX ADMIN — LACTOBACILLUS ACIDOPHILUS / LACTOBACILLUS BULGARICUS SCH GM: 100 MILLION CFU STRENGTH GRANULES at 09:01

## 2018-01-18 RX ADMIN — CYANOCOBALAMIN TAB 1000 MCG SCH MCG: 1000 TAB at 09:02

## 2018-01-18 NOTE — HP
REASON FOR TRANSFER:  To swing bed is chronic deconditioning with acute exacerbation and sleep apnea 
with hypoventilation syndrome.

 

HISTORY OF PRESENT ILLNESS:  The patient is an 83-year-old -American female who has had extens
wayne recent medical history, please see recent hospitalization at [facility] as
 well as her skilled nursing admission.  The patient was actually transferred from [facility] skilled nursing to acute care status at [facility] on 01/09/20
18 after an episode of altered mental status with confusion and disorientation.  She was seen and tracey
luated at [facility] and was found to have likely altered mental status second
lupe to hypoventilation syndrome, due to her morbid obesity with a history of sleep apnea in the past.
  The patient was continued to be extremely weak, so unable to ambulate on her own or transfers witho
ut assistance and it was felt that it would be appropriate for her to be transferred back to [facility] for continued rehabilitation and physical therapy as well as use of CPAP ma
flor to see if her confusion completely resolves.

 

PAST MEDICAL HISTORY:

1.  Hypertension.

2.  History of previous TIA/CVA.

3.  Obesity.

4.  Osteoarthritis.

5.  History of essential tremors.  For full past medical history see recent hospitalization.

 

REVIEW OF SYSTEMS:  At this time, the patient is back to her baseline mental status.  She is alert an
d oriented x3.  She denies any distress.  She reports no significant cough.  No shortness of breath o
r chest pain, no URI-like symptoms.  No recent visual changes.  The patient reports occasional consti
pation, which appears to be well controlled at this time.  She reports appetite has been improving.  
No nausea or vomiting.  The patient had a recent incontinence and has had a chronic Hinds catheter, w
hich hopefully will be removed soon.  The patient denies any depression.  She says her tremor is abou
t at its baseline.  No significant increased joint aches or pains.  She has no rashes other than some
 small hematomas on her abdomen from her recent Lovenox injections.

 

PHYSICAL EXAMINATION:

GENERAL:  Elderly  female, alert and oriented x3, in no obvious distress.

VITAL SIGNS:  Blood pressure 168/68, respiratory rate 16, pulse was in the 80s.

HEENT:  Atraumatic, normocephalic.  Extraocular movements are intact.  Pupils are equal, round, and r
eactive to light and accommodation.  Oropharynx, mucous membranes were moist.

NECK:  Supple, no masses palpated.

CHEST:  Clear to auscultation bilaterally.

HEART:  Regular rate and rhythm with occasional ectopic beat.

ABDOMEN:  Obese, soft, nontender, nondistended, no masses were palpated.  Resolving hematoma was note
d without significant warmth.

EXTREMITY:  Some diffuse edema of the extremities noted.

NEUROLOGIC:  The patient has diffuse motor weakness of all extremities.  She does have essential rest
ing tremor.

 

ASSESSMENT AND PLAN:

1.  Deconditioning severe, the patient had some mild improvement while she is undergoing her physical
 therapy and occupational therapy prior to this episode of altered mental status.  She still could no
t be independent, could not transfer on her own and really needs to continue her occupational and phy
sical therapy, thus she is appropriate for admission.

2.  Recent altered mental status likely secondary to hypoventilation syndrome from sleep apnea and mo
rbid obesity.  The patient was given an Auto CPAP device at [facility], which 
has been sent over with her.  We will continue the auto CPAP.  She will need a sleep study because he
r insurance will not cover CPAP device without a recent sleep study.  We will try to have this [facility]
ed on an outpatient basis.

3.  Hypertension.  She has had a history of high blood pressure was difficult to control.  We will co
ntinue clonidine p.r.n. as well as Norvasc, Coreg and minoxidil.

4.  Deep venous thrombosis prophylaxis.  We will continue the patient on Lovenox while she has poor a
bility to ambulate.

5.  History of urinary incontinence.  The patient was treated for UTI, which appears to resolve.  Hop
efully, we will remove the Hinds catheter in the near future.

 

DISPOSITION:  The patient wishes to be discharged to home at this time, she will need extensive impro
vement in her overall conditioning if that will be possible.

## 2018-01-19 RX ADMIN — CYANOCOBALAMIN TAB 1000 MCG SCH MCG: 1000 TAB at 08:52

## 2018-01-19 RX ADMIN — LACTOBACILLUS ACIDOPHILUS / LACTOBACILLUS BULGARICUS SCH GM: 100 MILLION CFU STRENGTH GRANULES at 08:55

## 2018-01-20 LAB
CREAT CL PREDICTED SERPL C-G-VRATE: 101 ML/MIN (ref 70–130)
HGB BLD-MCNC: 8.6 G/DL (ref 12–16)
PLATELET # BLD AUTO: 186 THOU/UL (ref 130–400)

## 2018-01-20 RX ADMIN — CYANOCOBALAMIN TAB 1000 MCG SCH MCG: 1000 TAB at 08:16

## 2018-01-20 RX ADMIN — LACTOBACILLUS ACIDOPHILUS / LACTOBACILLUS BULGARICUS SCH GM: 100 MILLION CFU STRENGTH GRANULES at 08:15

## 2018-01-21 RX ADMIN — LACTOBACILLUS ACIDOPHILUS / LACTOBACILLUS BULGARICUS SCH GM: 100 MILLION CFU STRENGTH GRANULES at 09:41

## 2018-01-21 RX ADMIN — CYANOCOBALAMIN TAB 1000 MCG SCH MCG: 1000 TAB at 09:39

## 2018-01-22 LAB
CREAT CL PREDICTED SERPL C-G-VRATE: 101 ML/MIN (ref 70–130)
HGB BLD-MCNC: 8.9 G/DL (ref 12–16)
PLATELET # BLD AUTO: 175 THOU/UL (ref 130–400)

## 2018-01-22 RX ADMIN — LACTOBACILLUS ACIDOPHILUS / LACTOBACILLUS BULGARICUS SCH GM: 100 MILLION CFU STRENGTH GRANULES at 09:46

## 2018-01-22 RX ADMIN — CYANOCOBALAMIN TAB 1000 MCG SCH MCG: 1000 TAB at 09:47

## 2018-01-23 LAB
ALBUMIN SERPL BCG-MCNC: 2.8 G/DL (ref 3.4–4.8)
ALP SERPL-CCNC: 61 U/L (ref 40–150)
ALT SERPL W P-5'-P-CCNC: 12 U/L (ref 8–55)
ANION GAP SERPL CALC-SCNC: 12 MMOL/L (ref 10–20)
AST SERPL-CCNC: 13 U/L (ref 5–34)
BASOPHILS # BLD AUTO: 0.1 THOU/UL (ref 0–0.2)
BASOPHILS NFR BLD AUTO: 2.2 % (ref 0–1)
BILIRUB SERPL-MCNC: 0.6 MG/DL (ref 0.2–1.2)
BUN SERPL-MCNC: 13 MG/DL (ref 9.8–20.1)
CALCIUM SERPL-MCNC: 8.9 MG/DL (ref 7.8–10.44)
CHLORIDE SERPL-SCNC: 101 MMOL/L (ref 98–107)
CO2 SERPL-SCNC: 30 MMOL/L (ref 23–31)
CREAT CL PREDICTED SERPL C-G-VRATE: 98 ML/MIN (ref 70–130)
EOSINOPHIL # BLD AUTO: 0.3 THOU/UL (ref 0–0.7)
EOSINOPHIL NFR BLD AUTO: 4.8 % (ref 0–10)
GLOBULIN SER CALC-MCNC: 2.5 G/DL (ref 2.4–3.5)
GLUCOSE SERPL-MCNC: 109 MG/DL (ref 83–110)
HGB BLD-MCNC: 8.4 G/DL (ref 12–16)
LYMPHOCYTES # BLD AUTO: 1.5 THOU/UL (ref 1.2–3.4)
LYMPHOCYTES NFR BLD AUTO: 23 % (ref 21–51)
MCH RBC QN AUTO: 31.4 PG (ref 27–31)
MCV RBC AUTO: 98.7 FL (ref 81–99)
MONOCYTES # BLD AUTO: 0.7 THOU/UL (ref 0.11–0.59)
MONOCYTES NFR BLD AUTO: 10.6 % (ref 0–10)
NEUTROPHILS # BLD AUTO: 3.7 THOU/UL (ref 1.4–6.5)
NEUTROPHILS NFR BLD AUTO: 59.4 % (ref 42–75)
PLATELET # BLD AUTO: 176 THOU/UL (ref 130–400)
POTASSIUM SERPL-SCNC: 3.3 MMOL/L (ref 3.5–5.1)
RBC # BLD AUTO: 2.67 MILL/UL (ref 4.2–5.4)
SODIUM SERPL-SCNC: 140 MMOL/L (ref 136–145)
WBC # BLD AUTO: 6.3 THOU/UL (ref 4.8–10.8)

## 2018-01-23 RX ADMIN — LACTOBACILLUS ACIDOPHILUS / LACTOBACILLUS BULGARICUS SCH GM: 100 MILLION CFU STRENGTH GRANULES at 09:41

## 2018-01-23 RX ADMIN — CYANOCOBALAMIN TAB 1000 MCG SCH MCG: 1000 TAB at 09:42

## 2018-01-23 NOTE — RAD
CHEST 2 VIEWS:

 

Date:  01/23/18

 

PA and lateral views are compared with the 12/25/17 portable film. 

 

FINDINGS:

The haziness in the right base is clear. There are no focal pulmonary infiltrates at the moment. Mild
 cardiomegaly is present, but is no worse than before. There is no congestion. The mediastinum is unr
emarkable. The trachea is midline. 

 

IMPRESSION: 

1.  Mild cardiomegaly without congestive change. 

2.  Prior infiltrates cleared. 

 

 

POS: HOME

## 2018-01-24 LAB
CREAT CL PREDICTED SERPL C-G-VRATE: 88 ML/MIN (ref 70–130)
HGB BLD-MCNC: 9.4 G/DL (ref 12–16)
PLATELET # BLD AUTO: 180 THOU/UL (ref 130–400)

## 2018-01-24 RX ADMIN — LACTOBACILLUS ACIDOPHILUS / LACTOBACILLUS BULGARICUS SCH GM: 100 MILLION CFU STRENGTH GRANULES at 08:34

## 2018-01-24 RX ADMIN — CYANOCOBALAMIN TAB 1000 MCG SCH MCG: 1000 TAB at 08:33

## 2018-01-25 LAB
ALBUMIN SERPL BCG-MCNC: 2.9 G/DL (ref 3.4–4.8)
ALP SERPL-CCNC: 61 U/L (ref 40–150)
ALT SERPL W P-5'-P-CCNC: 12 U/L (ref 8–55)
ANION GAP SERPL CALC-SCNC: 14 MMOL/L (ref 10–20)
AST SERPL-CCNC: 13 U/L (ref 5–34)
BILIRUB SERPL-MCNC: 0.6 MG/DL (ref 0.2–1.2)
BUN SERPL-MCNC: 15 MG/DL (ref 9.8–20.1)
CALCIUM SERPL-MCNC: 9.1 MG/DL (ref 7.8–10.44)
CHLORIDE SERPL-SCNC: 102 MMOL/L (ref 98–107)
CO2 SERPL-SCNC: 29 MMOL/L (ref 23–31)
CREAT CL PREDICTED SERPL C-G-VRATE: 88 ML/MIN (ref 70–130)
GLOBULIN SER CALC-MCNC: 2.5 G/DL (ref 2.4–3.5)
GLUCOSE SERPL-MCNC: 108 MG/DL (ref 83–110)
POTASSIUM SERPL-SCNC: 3.7 MMOL/L (ref 3.5–5.1)
SODIUM SERPL-SCNC: 141 MMOL/L (ref 136–145)

## 2018-01-25 RX ADMIN — CYANOCOBALAMIN TAB 1000 MCG SCH MCG: 1000 TAB at 08:33

## 2018-01-25 RX ADMIN — LACTOBACILLUS ACIDOPHILUS / LACTOBACILLUS BULGARICUS SCH GM: 100 MILLION CFU STRENGTH GRANULES at 08:35

## 2018-01-26 LAB
CREAT CL PREDICTED SERPL C-G-VRATE: 87 ML/MIN (ref 70–130)
HGB BLD-MCNC: 8.4 G/DL (ref 12–16)
PLATELET # BLD AUTO: 166 THOU/UL (ref 130–400)

## 2018-01-26 RX ADMIN — CYANOCOBALAMIN TAB 1000 MCG SCH MCG: 1000 TAB at 08:30

## 2018-01-26 RX ADMIN — LACTOBACILLUS ACIDOPHILUS / LACTOBACILLUS BULGARICUS SCH GM: 100 MILLION CFU STRENGTH GRANULES at 08:30

## 2018-01-27 RX ADMIN — CYANOCOBALAMIN TAB 1000 MCG SCH MCG: 1000 TAB at 08:19

## 2018-01-27 RX ADMIN — LACTOBACILLUS ACIDOPHILUS / LACTOBACILLUS BULGARICUS SCH GM: 100 MILLION CFU STRENGTH GRANULES at 08:17

## 2018-01-28 LAB
CREAT CL PREDICTED SERPL C-G-VRATE: 80 ML/MIN (ref 70–130)
HGB BLD-MCNC: 9.9 G/DL (ref 12–16)
PLATELET # BLD AUTO: 196 THOU/UL (ref 130–400)

## 2018-01-28 RX ADMIN — LACTOBACILLUS ACIDOPHILUS / LACTOBACILLUS BULGARICUS SCH GM: 100 MILLION CFU STRENGTH GRANULES at 08:48

## 2018-01-28 RX ADMIN — CYANOCOBALAMIN TAB 1000 MCG SCH MCG: 1000 TAB at 08:50

## 2018-01-29 ENCOUNTER — HOSPITAL ENCOUNTER (OUTPATIENT)
Dept: HOSPITAL 92 - SLEEPLAB | Age: 83
Discharge: HOME | End: 2018-01-29
Payer: MEDICARE

## 2018-01-29 DIAGNOSIS — I10: ICD-10-CM

## 2018-01-29 DIAGNOSIS — E66.9: ICD-10-CM

## 2018-01-29 DIAGNOSIS — G47.33: Primary | ICD-10-CM

## 2018-01-29 DIAGNOSIS — Z86.73: ICD-10-CM

## 2018-01-29 DIAGNOSIS — J98.9: ICD-10-CM

## 2018-01-29 LAB
ALBUMIN SERPL BCG-MCNC: 3 G/DL (ref 3.4–4.8)
ALP SERPL-CCNC: 68 U/L (ref 40–150)
ALT SERPL W P-5'-P-CCNC: 10 U/L (ref 8–55)
ANION GAP SERPL CALC-SCNC: 15 MMOL/L (ref 10–20)
AST SERPL-CCNC: 14 U/L (ref 5–34)
BILIRUB SERPL-MCNC: 0.6 MG/DL (ref 0.2–1.2)
BUN SERPL-MCNC: 19 MG/DL (ref 9.8–20.1)
CALCIUM SERPL-MCNC: 9.3 MG/DL (ref 7.8–10.44)
CHLORIDE SERPL-SCNC: 104 MMOL/L (ref 98–107)
CO2 SERPL-SCNC: 27 MMOL/L (ref 23–31)
CREAT CL PREDICTED SERPL C-G-VRATE: 70 ML/MIN (ref 70–130)
GLOBULIN SER CALC-MCNC: 2.7 G/DL (ref 2.4–3.5)
GLUCOSE SERPL-MCNC: 118 MG/DL (ref 83–110)
POTASSIUM SERPL-SCNC: 4 MMOL/L (ref 3.5–5.1)
PROT UR STRIP.AUTO-MCNC: (no result) MG/DL
RBC UR QL AUTO: (no result) HPF (ref 0–3)
SODIUM SERPL-SCNC: 142 MMOL/L (ref 136–145)
SP GR UR STRIP: 1.02 (ref 1–1.03)

## 2018-01-29 PROCEDURE — 95810 POLYSOM 6/> YRS 4/> PARAM: CPT

## 2018-01-29 RX ADMIN — CYANOCOBALAMIN TAB 1000 MCG SCH MCG: 1000 TAB at 09:39

## 2018-01-29 RX ADMIN — LACTOBACILLUS ACIDOPHILUS / LACTOBACILLUS BULGARICUS SCH GM: 100 MILLION CFU STRENGTH GRANULES at 09:38

## 2018-01-30 LAB
CREAT CL PREDICTED SERPL C-G-VRATE: 66 ML/MIN (ref 70–130)
HGB BLD-MCNC: 11.2 G/DL (ref 12–16)
PLATELET # BLD AUTO: 231 THOU/UL (ref 130–400)

## 2018-01-30 RX ADMIN — CYANOCOBALAMIN TAB 1000 MCG SCH MCG: 1000 TAB at 08:48

## 2018-01-30 RX ADMIN — LACTOBACILLUS ACIDOPHILUS / LACTOBACILLUS BULGARICUS SCH GM: 100 MILLION CFU STRENGTH GRANULES at 08:47

## 2018-01-31 RX ADMIN — LACTOBACILLUS ACIDOPHILUS / LACTOBACILLUS BULGARICUS SCH GM: 100 MILLION CFU STRENGTH GRANULES at 08:57

## 2018-01-31 RX ADMIN — CYANOCOBALAMIN TAB 1000 MCG SCH MCG: 1000 TAB at 08:55

## 2018-02-01 LAB
CREAT CL PREDICTED SERPL C-G-VRATE: 66 ML/MIN (ref 70–130)
HGB BLD-MCNC: 9.3 G/DL (ref 12–16)
PLATELET # BLD AUTO: 215 THOU/UL (ref 130–400)

## 2018-02-01 RX ADMIN — CYANOCOBALAMIN TAB 1000 MCG SCH MCG: 1000 TAB at 09:34

## 2018-02-01 RX ADMIN — LACTOBACILLUS ACIDOPHILUS / LACTOBACILLUS BULGARICUS SCH GM: 100 MILLION CFU STRENGTH GRANULES at 09:32

## 2018-02-02 VITALS — SYSTOLIC BLOOD PRESSURE: 137 MMHG | TEMPERATURE: 98.2 F | DIASTOLIC BLOOD PRESSURE: 64 MMHG

## 2018-02-02 LAB
ANION GAP SERPL CALC-SCNC: 16 MMOL/L (ref 10–20)
BUN SERPL-MCNC: 25 MG/DL (ref 9.8–20.1)
CALCIUM SERPL-MCNC: 9.5 MG/DL (ref 7.8–10.44)
CHLORIDE SERPL-SCNC: 105 MMOL/L (ref 98–107)
CO2 SERPL-SCNC: 25 MMOL/L (ref 23–31)
CREAT CL PREDICTED SERPL C-G-VRATE: 70 ML/MIN (ref 70–130)
GLUCOSE SERPL-MCNC: 113 MG/DL (ref 83–110)
POTASSIUM SERPL-SCNC: 4.1 MMOL/L (ref 3.5–5.1)
SODIUM SERPL-SCNC: 142 MMOL/L (ref 136–145)

## 2018-02-02 RX ADMIN — CYANOCOBALAMIN TAB 1000 MCG SCH MCG: 1000 TAB at 09:14

## 2018-02-02 RX ADMIN — LACTOBACILLUS ACIDOPHILUS / LACTOBACILLUS BULGARICUS SCH GM: 100 MILLION CFU STRENGTH GRANULES at 09:12

## 2018-02-08 NOTE — DIS
DATE OF ADMISSION:  To Skilled Nursing at [facility] was 12/19/2017.

 

DATE OF DISCHARGE/TRANSFER:  01/09/2018

 

REASON FOR TRANSFER:  Altered mental status.

 

BRIEF SUMMARY OF HISTORY AND PHYSICAL:  The patient is an 83-year-old female who has had previously b
een admitted to [facility] on 11/17/2017 with altered mental status, slurred s
peech, incoherence, confusion, dysarthria, and difficulty to ambulate.  She had extensive hospitaliza
tion for approximately a month and then was transferred to [facility] due to d
econditioning and her recent metabolic encephalopathy that was determined to be the cause of her alte
red mental status.  While at [facility], the patient slowly improved.  She had
 some edema that was treated with IV and p.o. Lasix.  The patient continued with physical therapy and
 occupational therapy, and had a suspected urinary tract infection that was treated orally on the mor
jaimee of 01/09/2018.  The nursing staff came to the patient's room, the patient was difficult to arous
e, was confused, incoherent, unable to ambulate due to the altered mental status and change in her ov
erall status, she was sent to the emergency room for evaluation.  In the emergency room, she had a CT
 scan which did not show any acute findings.  Due to her altered mental status, she was deemed to be 
appropriate to be sent back to [facility] for further evaluation.  The patient
 from the emergency room was sent via EMS to [facility].  She was accepted and
 had admission by the [facility].  She was sent with IV hep-lock and oxygen as far.

 

DISCHARGE MEDICATIONS:  The patient will likely continue all her discharge medications pending evalua
tion by the [facility], which would include tramadol 50 mg q.4 hours p.r.n. pain, minoxidil 2.
5 mg daily, Imdur 60 mg daily, Lasix 40 mg daily, folic acid 1 mg daily, iron sulfate 325 mg daily, f
amotidine 20 mg b.i.d., Cymbalta 60 mg daily, Colace 100 mg p.o. b.i.d., clonidine 0.1 mg p.o. q.8 ho
urs as needed for uncontrolled hypertension, Coreg 25 mg p.o. b.i.d., Lipitor 10 mg p.o. at bedtime, 
aspirin 325 mg daily, Norvasc 10 mg at bedtime, allopurinol 300 mg daily, Tylenol 650 mg p.o. every 6
 hours p.r.n. pain.  Code status appears to be FULL CODE.

 

ALLERGIES:  IODINE, PRIMIDONE, and HERPES ZOSTER VACCINE.

 

Once patient is evaluated and treated at [facility] if she improves and stable
 enough to be transferred back to skilled nursing at swing bed status.  We will likely accept her at 
that time.

## 2018-02-11 NOTE — DIS
DATE OF DISCHARGE:  02/02/2018

 

ADMISSION DIAGNOSES:

1.  Deconditioning.

2.  Hypoventilation syndrome.

3.  Sleep apnea.

4.  Congestive heart failure.

5.  Hypertension.

6.  Obesity.

 

DISCHARGE DIAGNOSES:

1.  Severe deconditioning, improved.

2.  Sleep apnea, stable.

3.  Hypoventilation syndrome secondary to obesity, improved with CPAP utilization.

4.  Hypertension, controlled.

5.  Congestive heart failure, stable.

 

BRIEF SUMMARY OF HISTORY AND PHYSICAL:  The patient is an extremely pleasant 83-year-old -Amer
ican female with a very extensive medical history over the last several months.  She spent almost one
-month period from November to December at [facility] after having altered men
becky status and severe deconditioning.  She was admitted to [facility] for reha
bilitative services on 12/19/2017 and had been improving somewhat.  See previous hospital records, bu
t then she was found on the morning of 01/09/2018 to be with altered mental status, extremely somnole
nt, with slurred speech and generalized weakness.  She was transferred to the emergency room and then
 sent to [facility] for evaluation and treatment.  At that time, her mental st
atus seemed to improve significantly.  She was diagnosed with altered mental status secondary to obes
ity-related hypoventilation syndrome and was started on CPAP device.  Patient apparently had been pre
scribed CPAP for sleep apnea previously, but had not been compliant and not using the device for an e
xtended period of time.  Since the patient was back to her baseline, but still severely deconditioned
, she was readmitted to [facility] on 01/10/2018.  She became compliant with 
the CPAP device over the next several weeks, she significantly improved, was able to get out of bed o
n.  She used a walker with ambulation and had overall increased strength.  She was found to have volu
me-overload symptoms which were treated more appropriately with increased Lasix dosage.  She initiall
y was on Lovenox for DVT prophylaxis, but once she became more mobile, that was discontinued.  She wa
s stable enough to be discharged to home with home health on 02/02/2018.

 

DISCHARGE INSTRUCTIONS:

Her discharge activity will be as tolerated.  She will have Home Health for physical therapy and occu
pational therapy.

 

Her diet was a low-sodium diet.  She was prescribed CPAP device which significantly helped her during
 her hospitalization and she reported willingness to be compliant with it.

 

DISCHARGE MEDICATIONS:  Her new medications on discharge were clonidine patch 0.1 transdermal every 7
 days due to labile blood pressure.  Her furosemide was increased to 80 mg daily.  She is to start po
tassium 20 mEq daily and lisinopril 20 mg p.o. q.p.m. for uncontrolled hypertension.  She will contin
ue her routine medications including Cymbalta 60 mg daily, allopurinol 300 mg daily, Norvasc 10 mg da
leyda, Lipitor 10 mg at bedtime, famotidine 20 mg p.o. b.i.d., Tylenol p.r.n. pain, albuterol neb treat
ments as needed, aspirin 325 mg daily, Coreg 25 mg twice a day, vitamin D daily, Colace 100 mg twice 
daily, folic acid daily, Imdur 60 mg daily, minoxidil 2.5 mg daily, tramadol 50 mg p.o. q.4 hours p.r
.n. pain, ferrous sulfate 325 mg daily with breakfast for anemia.  Dulcolax p.r.n. for constipation.

 

FOLLOWUP:  She will follow up with PCP in [facility].  Patient would like to actually stop maurice yang her doctor in [place] and would like to see a doctor in [place].  It was recommended that she see Dr Lennie Verma or Dr. Fran Rodríguez.  Patient had been seen by Dr. Verma during her hospital stay
 and would like to follow up with her.

## 2018-03-30 ENCOUNTER — HOSPITAL ENCOUNTER (EMERGENCY)
Dept: HOSPITAL 92 - SCSER | Age: 83
Discharge: HOME | End: 2018-03-30
Payer: MEDICARE

## 2018-03-30 DIAGNOSIS — F32.9: ICD-10-CM

## 2018-03-30 DIAGNOSIS — N30.01: Primary | ICD-10-CM

## 2018-03-30 DIAGNOSIS — I50.9: ICD-10-CM

## 2018-03-30 DIAGNOSIS — J45.909: ICD-10-CM

## 2018-03-30 DIAGNOSIS — Z79.899: ICD-10-CM

## 2018-03-30 DIAGNOSIS — I11.0: ICD-10-CM

## 2018-03-30 DIAGNOSIS — Z79.82: ICD-10-CM

## 2018-03-30 LAB
BACTERIA UR QL AUTO: (no result) HPF
PROT UR STRIP.AUTO-MCNC: 100 MG/DL
RBC UR QL AUTO: (no result) HPF (ref 0–3)
SP GR UR STRIP: 1.01 (ref 1–1.04)
WBC UR QL AUTO: (no result) HPF (ref 0–3)

## 2018-03-30 PROCEDURE — 87086 URINE CULTURE/COLONY COUNT: CPT

## 2018-03-30 PROCEDURE — 81015 MICROSCOPIC EXAM OF URINE: CPT

## 2018-03-30 PROCEDURE — 81003 URINALYSIS AUTO W/O SCOPE: CPT

## 2018-03-30 PROCEDURE — 99283 EMERGENCY DEPT VISIT LOW MDM: CPT

## 2018-08-21 ENCOUNTER — HOSPITAL ENCOUNTER (OUTPATIENT)
Dept: HOSPITAL 57 - BURRAD | Age: 83
Discharge: HOME | End: 2018-08-21
Attending: FAMILY MEDICINE
Payer: MEDICARE

## 2018-08-21 DIAGNOSIS — M25.552: Primary | ICD-10-CM

## 2018-08-21 PROCEDURE — 72170 X-RAY EXAM OF PELVIS: CPT

## 2018-08-21 NOTE — RAD
PELVIS

8/21/18

 

No gross pelvic fractures were seen. The SI joints were symmetrical and the arcuate lines of the sacr
um appear intact. The symphysis shows no widening or off-set and the pubic rings appear intact. The h
ip joints are equal in width. As stated on the hip film, there is a little bit of a step-off between 
the left femoral neck and head, however, it really does not appear substantially different than the r
ight hip. Thus there is no definite fracture seen here on this study; however, if the patient's pain 
is convincing or clinical course of concern, then further imaging such as CT or MRI should be conside
red to better assess the hips.

 

IMPRESSION:  

No definite acute findings. 

 

Findings discussed with Dr. Verma at approximately 1315 on 8/21/18.

 

POS: HOME

## 2018-08-21 NOTE — RAD
LEFT HIP TWO VIEWS:

8/21/18

 

No definite fracture was seen. There is a little bit of a step-off where the femoral neck meets the f
emoral head. I cannot exclude a fracture here, but this film does not definitively confirm one. The a
cetabulum appears normal and the adjacent pubic ring appears intact. 

 

IMPRESSION:  

Equivocal findings in the subcapital region of the left hip. Depending upon the presentation and cour
se, further imaging may be needed. A CT might be able to shows an injury if present, an MRI would be 
even more sensitive. 

 

Findings discussed with Dr. Verma at approximately 1315 on 8/21/18. 

 

POS: HOME

## 2018-08-28 ENCOUNTER — HOSPITAL ENCOUNTER (OUTPATIENT)
Dept: HOSPITAL 57 - BURCT | Age: 83
Discharge: HOME | End: 2018-08-28
Attending: FAMILY MEDICINE
Payer: MEDICARE

## 2018-08-28 DIAGNOSIS — S30.0XXA: ICD-10-CM

## 2018-08-28 DIAGNOSIS — M25.552: Primary | ICD-10-CM

## 2018-08-29 NOTE — CT
CT LEFT HIP:

 

Date:  08/28/18 

 

Spiral CT of the hip was done following trauma. Initial plain films did not show a fracture. This loy
dy is done in follow-up. Axial slices were acquired, and coronal and sagittal reconstructions were do
ne. 

 

FINDINGS:

No fracture was appreciated in the proximal femur, surrounding acetabulum, or visible portions of the
 adjacent bony pelvis. Femoral neck appeared intact and there did not appear to be any undue soft tis
terri swelling around the femoral neck. 

 

The main finding of the study, however, is an area of increased density in the fatty portions of the 
left buttocks consistent with a soft tissue hematoma from a recent trauma. The underlying gluteal mus
cles appeared normal. 

 

IMPRESSION: 

1.  No evidence of hip fracture. I would not that a 1 digit percentage of hip fractures are sometimes
 not visible on CT are only are seen on MRI. Nothing on the current images arouse suspicion of a hip 
fracture. 

 

2.  Soft tissue hematoma in the soft tissues of the left buttocks. 

 

 

 

 

POS: HOME

## 2018-11-10 ENCOUNTER — HOSPITAL ENCOUNTER (EMERGENCY)
Dept: HOSPITAL 92 - ERS | Age: 83
Discharge: HOME | End: 2018-11-10
Payer: MEDICARE

## 2018-11-10 DIAGNOSIS — M79.2: Primary | ICD-10-CM

## 2018-11-10 DIAGNOSIS — I50.9: ICD-10-CM

## 2018-11-10 DIAGNOSIS — J45.909: ICD-10-CM

## 2018-11-10 DIAGNOSIS — I11.0: ICD-10-CM

## 2018-11-10 DIAGNOSIS — Z79.82: ICD-10-CM

## 2018-11-10 DIAGNOSIS — Z79.899: ICD-10-CM

## 2018-11-10 DIAGNOSIS — F32.9: ICD-10-CM

## 2018-11-10 LAB
ALBUMIN SERPL BCG-MCNC: 3.6 G/DL (ref 3.4–4.8)
ALP SERPL-CCNC: 102 U/L (ref 40–150)
ALT SERPL W P-5'-P-CCNC: 12 U/L (ref 8–55)
ANION GAP SERPL CALC-SCNC: 12 MMOL/L (ref 10–20)
AST SERPL-CCNC: 23 U/L (ref 5–34)
BASOPHILS # BLD AUTO: 0 THOU/UL (ref 0–0.2)
BASOPHILS NFR BLD AUTO: 0.7 % (ref 0–1)
BILIRUB SERPL-MCNC: 0.5 MG/DL (ref 0.2–1.2)
BUN SERPL-MCNC: 51 MG/DL (ref 9.8–20.1)
CALCIUM SERPL-MCNC: 9.4 MG/DL (ref 7.8–10.44)
CHLORIDE SERPL-SCNC: 108 MMOL/L (ref 98–107)
CK MB SERPL-MCNC: 1.7 NG/ML (ref 0–6.6)
CO2 SERPL-SCNC: 26 MMOL/L (ref 23–31)
CREAT CL PREDICTED SERPL C-G-VRATE: 0 ML/MIN (ref 70–130)
EOSINOPHIL # BLD AUTO: 0.4 THOU/UL (ref 0–0.7)
EOSINOPHIL NFR BLD AUTO: 5.8 % (ref 0–10)
GLOBULIN SER CALC-MCNC: 3 G/DL (ref 2.4–3.5)
GLUCOSE SERPL-MCNC: 116 MG/DL (ref 83–110)
HGB BLD-MCNC: 11.6 G/DL (ref 12–16)
LYMPHOCYTES # BLD: 2.3 THOU/UL (ref 1.2–3.4)
LYMPHOCYTES NFR BLD AUTO: 34.2 % (ref 21–51)
MCH RBC QN AUTO: 30.8 PG (ref 27–31)
MCV RBC AUTO: 100 FL (ref 78–98)
MONOCYTES # BLD AUTO: 0.6 THOU/UL (ref 0.11–0.59)
MONOCYTES NFR BLD AUTO: 8.8 % (ref 0–10)
NEUTROPHILS # BLD AUTO: 3.4 THOU/UL (ref 1.4–6.5)
NEUTROPHILS NFR BLD AUTO: 50.5 % (ref 42–75)
PLATELET # BLD AUTO: 210 THOU/UL (ref 130–400)
POTASSIUM SERPL-SCNC: 4.8 MMOL/L (ref 3.5–5.1)
RBC # BLD AUTO: 3.78 MILL/UL (ref 4.2–5.4)
SODIUM SERPL-SCNC: 141 MMOL/L (ref 136–145)
TROPONIN I SERPL DL<=0.01 NG/ML-MCNC: (no result) NG/ML (ref ?–0.03)
WBC # BLD AUTO: 6.7 THOU/UL (ref 4.8–10.8)

## 2018-11-10 PROCEDURE — 93005 ELECTROCARDIOGRAM TRACING: CPT

## 2018-11-10 PROCEDURE — 84484 ASSAY OF TROPONIN QUANT: CPT

## 2018-11-10 PROCEDURE — 80053 COMPREHEN METABOLIC PANEL: CPT

## 2018-11-10 PROCEDURE — 36415 COLL VENOUS BLD VENIPUNCTURE: CPT

## 2018-11-10 PROCEDURE — 82553 CREATINE MB FRACTION: CPT

## 2018-11-10 PROCEDURE — 70450 CT HEAD/BRAIN W/O DYE: CPT

## 2018-11-10 PROCEDURE — 85025 COMPLETE CBC W/AUTO DIFF WBC: CPT

## 2018-11-10 NOTE — CT
BRAIN CT WITHOUT IV CONTRAST:

11/10/18

 

HISTORY: 

84-year-old female with arm weakness which is resolved. No focal mass or midline shift. No intra or e
xtra-axial hemorrhage. Sinuses and mastoids are clear. 

 

IMPRESSION:  

No mass or bleed or other acute process. Stable from prior 1/9/18 study.

 

POS: [facility]

## 2019-01-02 ENCOUNTER — HOSPITAL ENCOUNTER (OUTPATIENT)
Dept: HOSPITAL 92 - ERS | Age: 84
Setting detail: OBSERVATION
LOS: 2 days | Discharge: HOME | End: 2019-01-04
Attending: FAMILY MEDICINE | Admitting: FAMILY MEDICINE
Payer: MEDICARE

## 2019-01-02 VITALS — BODY MASS INDEX: 40.6 KG/M2

## 2019-01-02 DIAGNOSIS — I13.0: ICD-10-CM

## 2019-01-02 DIAGNOSIS — Z90.49: ICD-10-CM

## 2019-01-02 DIAGNOSIS — G47.33: ICD-10-CM

## 2019-01-02 DIAGNOSIS — H40.9: ICD-10-CM

## 2019-01-02 DIAGNOSIS — Z98.890: ICD-10-CM

## 2019-01-02 DIAGNOSIS — F32.9: ICD-10-CM

## 2019-01-02 DIAGNOSIS — E66.01: ICD-10-CM

## 2019-01-02 DIAGNOSIS — Z79.899: ICD-10-CM

## 2019-01-02 DIAGNOSIS — Z88.8: ICD-10-CM

## 2019-01-02 DIAGNOSIS — Z91.09: ICD-10-CM

## 2019-01-02 DIAGNOSIS — N18.3: ICD-10-CM

## 2019-01-02 DIAGNOSIS — D64.9: ICD-10-CM

## 2019-01-02 DIAGNOSIS — M19.90: ICD-10-CM

## 2019-01-02 DIAGNOSIS — I50.32: ICD-10-CM

## 2019-01-02 DIAGNOSIS — Z88.7: ICD-10-CM

## 2019-01-02 DIAGNOSIS — Z79.82: ICD-10-CM

## 2019-01-02 DIAGNOSIS — M79.7: ICD-10-CM

## 2019-01-02 DIAGNOSIS — J44.9: ICD-10-CM

## 2019-01-02 DIAGNOSIS — E78.5: ICD-10-CM

## 2019-01-02 DIAGNOSIS — Z96.653: ICD-10-CM

## 2019-01-02 DIAGNOSIS — Z90.710: ICD-10-CM

## 2019-01-02 DIAGNOSIS — E87.5: Primary | ICD-10-CM

## 2019-01-02 DIAGNOSIS — I25.10: ICD-10-CM

## 2019-01-02 DIAGNOSIS — F41.9: ICD-10-CM

## 2019-01-02 DIAGNOSIS — N17.9: ICD-10-CM

## 2019-01-02 DIAGNOSIS — M10.9: ICD-10-CM

## 2019-01-02 LAB
ALBUMIN SERPL BCG-MCNC: 3.4 G/DL (ref 3.4–4.8)
ALP SERPL-CCNC: 88 U/L (ref 40–150)
ALT SERPL W P-5'-P-CCNC: 10 U/L (ref 8–55)
ANION GAP SERPL CALC-SCNC: 17 MMOL/L (ref 10–20)
ANISOCYTOSIS BLD QL SMEAR: (no result) (100X)
AST SERPL-CCNC: 21 U/L (ref 5–34)
BILIRUB SERPL-MCNC: 0.5 MG/DL (ref 0.2–1.2)
BUN SERPL-MCNC: 84 MG/DL (ref 9.8–20.1)
CALCIUM SERPL-MCNC: 9.5 MG/DL (ref 7.8–10.44)
CHLORIDE SERPL-SCNC: 108 MMOL/L (ref 98–107)
CO2 SERPL-SCNC: 17 MMOL/L (ref 23–31)
CREAT CL PREDICTED SERPL C-G-VRATE: 0 ML/MIN (ref 70–130)
GLOBULIN SER CALC-MCNC: 3.5 G/DL (ref 2.4–3.5)
GLUCOSE SERPL-MCNC: 94 MG/DL (ref 83–110)
HGB BLD-MCNC: 11.4 G/DL (ref 12–16)
MACROCYTES BLD QL SMEAR: (no result) (100X)
MCH RBC QN AUTO: 32.7 PG (ref 27–31)
MCV RBC AUTO: 102 FL (ref 78–98)
MDIFF COMPLETE?: YES
PLATELET # BLD AUTO: 204 THOU/UL (ref 130–400)
PLATELET BLD QL SMEAR: (no result)
POLYCHROMASIA BLD QL SMEAR: (no result) (100X)
POTASSIUM SERPL-SCNC: 6.3 MMOL/L (ref 3.5–5.1)
POTASSIUM SERPL-SCNC: 6.6 MMOL/L (ref 3.5–5.1)
RBC # BLD AUTO: 3.47 MILL/UL (ref 4.2–5.4)
SCHISTOCYTES BLD QL SMEAR: (no result) (100X)
SODIUM SERPL-SCNC: 135 MMOL/L (ref 136–145)
WBC # BLD AUTO: 6.6 THOU/UL (ref 4.8–10.8)

## 2019-01-02 PROCEDURE — 82570 ASSAY OF URINE CREATININE: CPT

## 2019-01-02 PROCEDURE — 36415 COLL VENOUS BLD VENIPUNCTURE: CPT

## 2019-01-02 PROCEDURE — 99285 EMERGENCY DEPT VISIT HI MDM: CPT

## 2019-01-02 PROCEDURE — 81001 URINALYSIS AUTO W/SCOPE: CPT

## 2019-01-02 PROCEDURE — 96375 TX/PRO/DX INJ NEW DRUG ADDON: CPT

## 2019-01-02 PROCEDURE — 84300 ASSAY OF URINE SODIUM: CPT

## 2019-01-02 PROCEDURE — 87086 URINE CULTURE/COLONY COUNT: CPT

## 2019-01-02 PROCEDURE — 82962 GLUCOSE BLOOD TEST: CPT

## 2019-01-02 PROCEDURE — 80048 BASIC METABOLIC PNL TOTAL CA: CPT

## 2019-01-02 PROCEDURE — 96374 THER/PROPH/DIAG INJ IV PUSH: CPT

## 2019-01-02 PROCEDURE — 80053 COMPREHEN METABOLIC PANEL: CPT

## 2019-01-02 PROCEDURE — 36416 COLLJ CAPILLARY BLOOD SPEC: CPT

## 2019-01-02 PROCEDURE — 93005 ELECTROCARDIOGRAM TRACING: CPT

## 2019-01-02 PROCEDURE — 97139 UNLISTED THERAPEUTIC PX: CPT

## 2019-01-02 PROCEDURE — 84156 ASSAY OF PROTEIN URINE: CPT

## 2019-01-02 PROCEDURE — 94660 CPAP INITIATION&MGMT: CPT

## 2019-01-02 PROCEDURE — 96367 TX/PROPH/DG ADDL SEQ IV INF: CPT

## 2019-01-02 PROCEDURE — G0378 HOSPITAL OBSERVATION PER HR: HCPCS

## 2019-01-02 PROCEDURE — 85025 COMPLETE CBC W/AUTO DIFF WBC: CPT

## 2019-01-02 PROCEDURE — 84132 ASSAY OF SERUM POTASSIUM: CPT

## 2019-01-02 PROCEDURE — 83880 ASSAY OF NATRIURETIC PEPTIDE: CPT

## 2019-01-02 PROCEDURE — 96361 HYDRATE IV INFUSION ADD-ON: CPT

## 2019-01-02 PROCEDURE — 94640 AIRWAY INHALATION TREATMENT: CPT

## 2019-01-02 NOTE — HP
PRIMARY CARE PHYSICIAN:  Dr. Leona Verma.



REASON FOR ADMISSION:  Acute kidney failure, hyperkalemia.



HISTORY OF PRESENT ILLNESS:  An 84-year-old  female who has multiple

medical problems including hypertension, chronic kidney disease stage 3, anxiety and

depression, coronary artery disease, morbid obesity with obstructive sleep apnea,

who was brought to emergency room for generalized weakness and dizziness.  The

patient was taking spironolactone, potassium supplement, and lisinopril at home.

She had routine blood test done recently by primary care physician and the patient

was found with hyperkalemia at that point.  The patient received a call from primary

care physician's office to hold the spironolactone for 3 days.  The patient was

continued to feel weak and dizzy, and she had an episode of fall at home without any

injury and that is why family member brought her to emergency room for evaluation.

Today in the emergency room, the patient's potassium is 6.6 and her creatinine is

2.44.  Her creatinine was 1.6 back in November and 1.07 back in February.  The

patient was taking potassium supplement and she was also eating banana and other

fruits at home on regular basis.  She was also taking lisinopril and Aldactone up

until couple of days ago. 



The patient denies any chest pain, palpitation, shortness of breath.  She denies any

constipation, diarrhea, melena, or hematochezia.  She denies any UTI symptoms.  She

denies any flu-like illness. 



REVIEW OF SYSTEMS:  CONSTITUTIONAL:  Negative for weight loss or gain, ability to

conduct usual activities. 

SKIN:  Negative for rash, itching. 

EYES:  Negative for double vision, pain. 

ENT/MOUTH:  Negative for nose bleeding, neck stiffness, pain, tenderness. 

CARDIOVASCULAR:  Negative for palpitations, dyspnea on exertion, orthopnea. 

RESPIRATORY:  Negative for shortness of breath, wheezing, cough, hemoptysis, fever

or night sweats. 

GASTROINTESTINAL:  Negative for poor appetite, abdominal pain, heartburn, nausea,

vomiting, constipation, or diarrhea. 

GENITOURINARY:  Negative for urgency, frequency, dysuria, nocturia. 

MUSCULOSKELETAL:  Negative for pain, swelling. 

NEUROLOGIC/PSYCHIATRIC:  Negative for anxiety, depression. 

ALLERGY/IMMUNOLOGIC:  Negative for skin rash, bleeding tendency. 



See my HPI for pertinent positives and negatives.  All other review of systems

reviewed and negative except as mentioned in HPI. 



PAST MEDICAL HISTORY:  Fibromyalgia, osteoarthritis, hypertension, morbid obesity,

obstructive sleep apnea, chronic diastolic heart failure, glaucoma, chronic

normocytic anemia, CKD stage 3, asthma/COPD, dyslipidemia. 



PAST SURGICAL HISTORY:  Bilateral knee replacement, right shoulder surgery,

cholecystectomy, hysterectomy. 



PAST PSYCHIATRIC HISTORY:  Anxiety and depression.



SOCIAL HISTORY:  The patient drinks alcohol socially once a month.  She denies any

smoking.  She denies any other illicit drug abuse. 



FAMILY HISTORY:  No family history of coronary artery disease, stroke, or cancer.



ALLERGIES:  

1. IODINE.

2. PRIMIDONE.

3. VARICELLA ZOSTER IMMUNOGLOBULIN ZOSTER VACCINE.



CURRENT HOME MEDICATIONS:  

1. Cymbalta 60 mg daily.

2. Amlodipine 5 mg daily.

3. Coreg 6.25 mg twice daily.

4. Allopurinol 300 mg p.o. daily.

5. Gabapentin 100 mg daily.

6. Lasix 40 mg daily.

7. Imdur 60 mg p.o. daily.

8. Aldactone 25 mg twice daily.

9. Lisinopril 20 mg p.o. daily.

10. Potassium chloride supplement daily.



EMERGENCY ROOM COURSE:  The patient has received,

1. Amlodipine 5 mg.

2. Lisinopril 20 mg.

3. Kayexalate 30 g.

4. Sodium bicarbonate one ampule.

5. Novolin R 10 units.

6. D50 with water one ampule.



PHYSICAL EXAMINATION:

VITAL SIGNS:  Currently blood pressure 184/72, pulse 69, respiratory rate 18,

temperature 97.4, saturation 100% on room air, weight 107.5 kg.  GENERAL:  The

patient is currently alert and awake, no obvious acute distress. 

HEENT:  Head is normocephalic, atraumatic.  Eyes; pupils are round and reactive to

light.  Extraocular muscles intact.  ENT; oropharynx within normal limits.  Moist

mucous membranes.  No oral lesion.  No pharyngeal erythema.  No exudate. 

NECK:  Supple.  No JVD.  No thyromegaly.  No carotid bruit. 

LUNGS:  Clear to auscultation without any rhonchi or rales. 

CARDIAC:  S1 and S2 appears regular.  No murmur.  No gallop.  No rub. ABDOMEN:

Obesity present.  Bowel [id number] present.  Nontender.  Nondistended.  No

organomegaly.  No mass.  No suprapubic tenderness. 

BACK:  Unremarkable.  No CVA tenderness. 

EXTREMITIES:  Upper extremities; passive movement of all joints are normal.  Lower

extremities, no edema, good distal pulsation. 

SKIN:  No skin rash. 

HEMATOLOGICAL SYSTEM:  No lymphadenopathy. 

NEUROLOGIC:  Nonfocal examination.



SIGNIFICANT LABORATORY DATA:  CBC; WBC 6.6, hemoglobin 11.4, , platelet of

204.  BMP; sodium 135, potassium 6.6, chloride 108, carbon dioxide 17, BUN 84,

creatinine 2.44, glucose 94, calcium 9.5.  LFT; AST 21, ALT 10, alkaline phosphatase

88, albumin 3.4.  BNP 80.2. 



ASSESSMENT AND PLAN:  

1. Acute hyperkalemia.  This patient was taking spironolactone and lisinopril, as

well as potassium supplement as well as she was non conscious about her diet as

well, all contributed to her severe hyperkalemia.  At this point, Aldactone

definitely will be stopped.  Lisinopril will be kept on hold as well as potassium

supplement will be discontinued.  The patient is given a cocktail treatment of

hyperkalemia in the emergency room, but she has not received calcium gluconate,

which we will give her now.  We will repeat BMP tomorrow.  The patient will be given

low-potassium diet. 

2. Acute on chronic kidney failure, baseline chronic kidney disease stage 3.  The

patient's current creatinine is 2.44.  The patient will be given gentle IV fluid

overnight and will repeat BMP tomorrow. 

3. Hypertension.  We will continue amlodipine 5 mg p.o. daily, clonidine 0.1 mg

every 7 days, Imdur 60 mg daily. 

4. Gout.  We will continue allopurinol 100 mg daily.  We will reduce this dose

because of renal failure. 

5. Anxiety and depression.  We will continue Cymbalta 60 mg p.o. daily.

6. Chronic obstructive pulmonary disease/asthma.  We will continue DuoNeb q.6 hourly

p.r.n. 

7. Obstructive sleep apnea.  We will continue CPAP machine as per home therapy.

8. Macrocytic anemia.  We will start folic acid and vitamin B12 therapy.

9. Coronary artery disease.  We will continue aspirin 81 mg p.o. daily.  We will

reduce dose of Coreg to 3.125 mg p.o. b.i.d. 

10. Deep venous thrombosis prophylaxis, not needed because we are expecting

discharge soon. 

11. Gastrointestinal prophylaxis, Pepcid 20 mg p.o. daily.



CODE STATUS:  The patient is full code.  The patient's  is surrogate decision

maker. 



DISPOSITION:  Plan based on clinical course.   



Plan of care discussed with the patient and family member at bedside.







Job ID:  [id number]

## 2019-01-03 LAB
ANION GAP SERPL CALC-SCNC: 12 MMOL/L (ref 10–20)
ANION GAP SERPL CALC-SCNC: 15 MMOL/L (ref 10–20)
BASOPHILS # BLD AUTO: 0.1 THOU/UL (ref 0–0.2)
BASOPHILS NFR BLD AUTO: 1.3 % (ref 0–1)
BUN SERPL-MCNC: 70 MG/DL (ref 9.8–20.1)
BUN SERPL-MCNC: 81 MG/DL (ref 9.8–20.1)
CALCIUM SERPL-MCNC: 10 MG/DL (ref 7.8–10.44)
CALCIUM SERPL-MCNC: 9.3 MG/DL (ref 7.8–10.44)
CHLORIDE SERPL-SCNC: 109 MMOL/L (ref 98–107)
CHLORIDE SERPL-SCNC: 110 MMOL/L (ref 98–107)
CO2 SERPL-SCNC: 23 MMOL/L (ref 23–31)
CO2 SERPL-SCNC: 24 MMOL/L (ref 23–31)
CREAT CL PREDICTED SERPL C-G-VRATE: 32 ML/MIN (ref 70–130)
CREAT CL PREDICTED SERPL C-G-VRATE: 40 ML/MIN (ref 70–130)
CRYSTAL-AUWI FLAG: 0 (ref 0–15)
EOSINOPHIL # BLD AUTO: 0.2 THOU/UL (ref 0–0.7)
EOSINOPHIL NFR BLD AUTO: 3.2 % (ref 0–10)
GLUCOSE SERPL-MCNC: 133 MG/DL (ref 83–110)
GLUCOSE SERPL-MCNC: 142 MG/DL (ref 83–110)
HEV IGM SER QL: 2.7 (ref 0–7.99)
HGB BLD-MCNC: 11.3 G/DL (ref 12–16)
HYALINE CASTS #/AREA URNS LPF: (no result) LPF
LYMPHOCYTES # BLD: 1.9 THOU/UL (ref 1.2–3.4)
LYMPHOCYTES NFR BLD AUTO: 26.3 % (ref 21–51)
MCH RBC QN AUTO: 32.7 PG (ref 27–31)
MCV RBC AUTO: 101 FL (ref 78–98)
MONOCYTES # BLD AUTO: 0.6 THOU/UL (ref 0.11–0.59)
MONOCYTES NFR BLD AUTO: 7.5 % (ref 0–10)
NEUTROPHILS # BLD AUTO: 4.5 THOU/UL (ref 1.4–6.5)
NEUTROPHILS NFR BLD AUTO: 61.7 % (ref 42–75)
PATHC CAST-AUWI FLAG: 0.58 (ref 0–2.49)
PLATELET # BLD AUTO: 197 THOU/UL (ref 130–400)
POTASSIUM SERPL-SCNC: 5.3 MMOL/L (ref 3.5–5.1)
POTASSIUM SERPL-SCNC: 5.3 MMOL/L (ref 3.5–5.1)
PROT UR-MCNC: 22 MG/DL (ref 1–14)
RBC # BLD AUTO: 3.46 MILL/UL (ref 4.2–5.4)
RBC UR QL AUTO: (no result) HPF (ref 0–3)
SODIUM SERPL-SCNC: 140 MMOL/L (ref 136–145)
SODIUM SERPL-SCNC: 143 MMOL/L (ref 136–145)
SODIUM UR-SCNC: 74 MMOL/L
SP GR UR STRIP: 1.01 (ref 1–1.04)
SPERM-AUWI FLAG: 0 (ref 0–9.9)
WBC # BLD AUTO: 7.4 THOU/UL (ref 4.8–10.8)
WBC UR QL AUTO: (no result) HPF (ref 0–3)
YEAST-AUWI FLAG: 0 (ref 0–25)

## 2019-01-03 RX ADMIN — CYANOCOBALAMIN TAB 1000 MCG SCH MCG: 1000 TAB at 07:47

## 2019-01-03 NOTE — PDOC.PN
- Subjective


Encounter Start Date: 01/03/19


Encounter Start Time: 08:30


Subjective: Patient examined this morning, states she feels better


-: Denies dizziness, SOB, chest pain





- Objective


Resuscitation Status - Order Detail:





01/02/19 21:27


Resuscitation Status Routine 


   Resuscitation Status: FULL: Full Resuscitation








Vital Signs & Weight: 


 Vital Signs (12 hours)











  Temp Pulse Resp BP Pulse Ox


 


 01/03/19 11:48  99.1 F  78  16  126/56 L  98


 


 01/03/19 07:41  97.9 F  88  18  131/60  98








 Weight











Weight                         107.547 kg














I&O: 


 











 01/02/19 01/03/19 01/04/19





 06:59 06:59 06:59


 


Intake Total  852 


 


Output Total  200 150


 


Balance  652 -150











Result Diagrams: 


 01/03/19 01:17





 01/03/19 12:39


Additional Labs: 


 Accuchecks











  01/02/19





  20:11


 


POC Glucose  86














Phys Exam





- Physical Examination


HEENT: PERRLA, moist MMs


Neck: no nodes, no JVD


Respiratory: no wheezing, clear to auscultation bilateral


Cardiovascular: RRR


Gastrointestinal: soft, non-tender


Musculoskeletal: no edema, pulses present


Neurological: non-focal, normal sensation, moves all 4 limbs


Lymphatic: no nodes


Psychiatric: normal affect, A&O x 3


Skin: no rash, normal turgor





Dx/Plan


(1) Pre-syncope


Status: Acute   





(2) Hypertension


Code(s): I10 - ESSENTIAL (PRIMARY) HYPERTENSION   Status: Chronic   





(3) CKD (chronic kidney disease) stage 3, GFR 30-59 ml/min


Code(s): N18.3 - CHRONIC KIDNEY DISEASE, STAGE 3 (MODERATE)   Status: Chronic   

Comment: GFR up over 60 now   





(4) Acute kidney injury


Code(s): N17.9 - ACUTE KIDNEY FAILURE, UNSPECIFIED   Status: Acute   Comment: 

AM labs   





(5) Hyperkalemia


Code(s): E87.5 - HYPERKALEMIA   Status: Acute   Comment: resolved..   





- Plan


cont current plan of care


-: Potassium level improved, creatinine has also improved 


-: Will continue gentle hydration, recheck labs in am





* .

## 2019-01-04 VITALS — DIASTOLIC BLOOD PRESSURE: 59 MMHG | SYSTOLIC BLOOD PRESSURE: 134 MMHG | TEMPERATURE: 98.3 F

## 2019-01-04 LAB
ANION GAP SERPL CALC-SCNC: 12 MMOL/L (ref 10–20)
BUN SERPL-MCNC: 57 MG/DL (ref 9.8–20.1)
CALCIUM SERPL-MCNC: 9.1 MG/DL (ref 7.8–10.44)
CHLORIDE SERPL-SCNC: 112 MMOL/L (ref 98–107)
CO2 SERPL-SCNC: 22 MMOL/L (ref 23–31)
CREAT CL PREDICTED SERPL C-G-VRATE: 49 ML/MIN (ref 70–130)
GLUCOSE SERPL-MCNC: 101 MG/DL (ref 83–110)
POTASSIUM SERPL-SCNC: 5.1 MMOL/L (ref 3.5–5.1)
SODIUM SERPL-SCNC: 141 MMOL/L (ref 136–145)

## 2019-01-04 RX ADMIN — CYANOCOBALAMIN TAB 1000 MCG SCH MCG: 1000 TAB at 08:06

## 2019-01-04 RX ADMIN — CALCIUM CARBONATE PRN MG: 500 TABLET, CHEWABLE ORAL at 08:11

## 2019-01-04 RX ADMIN — CALCIUM CARBONATE PRN MG: 500 TABLET, CHEWABLE ORAL at 14:15

## 2019-11-04 ENCOUNTER — HOSPITAL ENCOUNTER (EMERGENCY)
Dept: HOSPITAL 57 - BURERS | Age: 84
Discharge: HOME | End: 2019-11-04
Payer: MEDICARE

## 2019-11-04 DIAGNOSIS — D64.9: ICD-10-CM

## 2019-11-04 DIAGNOSIS — F32.9: ICD-10-CM

## 2019-11-04 DIAGNOSIS — I50.9: ICD-10-CM

## 2019-11-04 DIAGNOSIS — L03.011: Primary | ICD-10-CM

## 2019-11-04 DIAGNOSIS — J45.909: ICD-10-CM

## 2019-11-04 DIAGNOSIS — I11.0: ICD-10-CM

## 2019-11-04 DIAGNOSIS — E78.00: ICD-10-CM

## 2019-11-04 PROCEDURE — 99283 EMERGENCY DEPT VISIT LOW MDM: CPT

## 2019-12-07 ENCOUNTER — HOSPITAL ENCOUNTER (INPATIENT)
Dept: HOSPITAL 92 - ERS | Age: 84
LOS: 3 days | Discharge: HOME HEALTH | DRG: 300 | End: 2019-12-10
Attending: INTERNAL MEDICINE | Admitting: INTERNAL MEDICINE
Payer: MEDICARE

## 2019-12-07 VITALS — BODY MASS INDEX: 38.7 KG/M2

## 2019-12-07 DIAGNOSIS — F32.9: ICD-10-CM

## 2019-12-07 DIAGNOSIS — F41.9: ICD-10-CM

## 2019-12-07 DIAGNOSIS — L97.929: ICD-10-CM

## 2019-12-07 DIAGNOSIS — L03.116: ICD-10-CM

## 2019-12-07 DIAGNOSIS — Z90.710: ICD-10-CM

## 2019-12-07 DIAGNOSIS — E66.9: ICD-10-CM

## 2019-12-07 DIAGNOSIS — I83.009: ICD-10-CM

## 2019-12-07 DIAGNOSIS — I44.1: ICD-10-CM

## 2019-12-07 DIAGNOSIS — I25.10: ICD-10-CM

## 2019-12-07 DIAGNOSIS — G47.33: ICD-10-CM

## 2019-12-07 DIAGNOSIS — I87.2: Primary | ICD-10-CM

## 2019-12-07 DIAGNOSIS — I13.0: ICD-10-CM

## 2019-12-07 DIAGNOSIS — R53.81: ICD-10-CM

## 2019-12-07 DIAGNOSIS — Z98.49: ICD-10-CM

## 2019-12-07 DIAGNOSIS — N18.3: ICD-10-CM

## 2019-12-07 DIAGNOSIS — L97.919: ICD-10-CM

## 2019-12-07 DIAGNOSIS — I50.32: ICD-10-CM

## 2019-12-07 DIAGNOSIS — J45.909: ICD-10-CM

## 2019-12-07 DIAGNOSIS — Z96.653: ICD-10-CM

## 2019-12-07 DIAGNOSIS — N17.9: ICD-10-CM

## 2019-12-07 PROCEDURE — 36415 COLL VENOUS BLD VENIPUNCTURE: CPT

## 2019-12-07 PROCEDURE — 93005 ELECTROCARDIOGRAM TRACING: CPT

## 2019-12-07 PROCEDURE — 80048 BASIC METABOLIC PNL TOTAL CA: CPT

## 2019-12-07 PROCEDURE — 84484 ASSAY OF TROPONIN QUANT: CPT

## 2019-12-07 PROCEDURE — 81003 URINALYSIS AUTO W/O SCOPE: CPT

## 2019-12-07 PROCEDURE — 71045 X-RAY EXAM CHEST 1 VIEW: CPT

## 2019-12-07 PROCEDURE — 96374 THER/PROPH/DIAG INJ IV PUSH: CPT

## 2019-12-07 PROCEDURE — 83735 ASSAY OF MAGNESIUM: CPT

## 2019-12-07 PROCEDURE — 82550 ASSAY OF CK (CPK): CPT

## 2019-12-07 PROCEDURE — 36416 COLLJ CAPILLARY BLOOD SPEC: CPT

## 2019-12-07 PROCEDURE — 81015 MICROSCOPIC EXAM OF URINE: CPT

## 2019-12-07 PROCEDURE — 82553 CREATINE MB FRACTION: CPT

## 2019-12-07 PROCEDURE — 94664 DEMO&/EVAL PT USE INHALER: CPT

## 2019-12-07 PROCEDURE — 83880 ASSAY OF NATRIURETIC PEPTIDE: CPT

## 2019-12-07 PROCEDURE — 80053 COMPREHEN METABOLIC PANEL: CPT

## 2019-12-07 PROCEDURE — 85025 COMPLETE CBC W/AUTO DIFF WBC: CPT

## 2019-12-08 LAB
ALBUMIN SERPL BCG-MCNC: 2.8 G/DL (ref 3.4–4.8)
ALP SERPL-CCNC: 85 U/L (ref 40–110)
ALT SERPL W P-5'-P-CCNC: 10 U/L (ref 8–55)
ANION GAP SERPL CALC-SCNC: 11 MMOL/L (ref 10–20)
AST SERPL-CCNC: 17 U/L (ref 5–34)
BACTERIA UR QL AUTO: (no result) HPF
BASOPHILS # BLD AUTO: 0.1 THOU/UL (ref 0–0.2)
BASOPHILS NFR BLD AUTO: 1 % (ref 0–1)
BILIRUB SERPL-MCNC: 0.4 MG/DL (ref 0.2–1.2)
BUN SERPL-MCNC: 49 MG/DL (ref 9.8–20.1)
CALCIUM SERPL-MCNC: 8.8 MG/DL (ref 7.8–10.44)
CHLORIDE SERPL-SCNC: 106 MMOL/L (ref 98–107)
CK MB SERPL-MCNC: 1.1 NG/ML (ref 0–6.6)
CK SERPL-CCNC: 63 U/L (ref 29–168)
CO2 SERPL-SCNC: 26 MMOL/L (ref 23–31)
CREAT CL PREDICTED SERPL C-G-VRATE: 0 ML/MIN (ref 70–130)
EOSINOPHIL # BLD AUTO: 0.4 THOU/UL (ref 0–0.7)
EOSINOPHIL NFR BLD AUTO: 4.1 % (ref 0–10)
GLOBULIN SER CALC-MCNC: 2.8 G/DL (ref 2.4–3.5)
GLUCOSE SERPL-MCNC: 128 MG/DL (ref 83–110)
HGB BLD-MCNC: 10.9 G/DL (ref 12–16)
LEUKOCYTE ESTERASE UR QL STRIP.AUTO: 75 LEU/UL
LYMPHOCYTES # BLD: 2.2 THOU/UL (ref 1.2–3.4)
LYMPHOCYTES NFR BLD AUTO: 23.9 % (ref 21–51)
MCH RBC QN AUTO: 36.3 PG (ref 27–31)
MCV RBC AUTO: 104 FL (ref 78–98)
MONOCYTES # BLD AUTO: 1 THOU/UL (ref 0.11–0.59)
MONOCYTES NFR BLD AUTO: 10.8 % (ref 0–10)
NEUTROPHILS # BLD AUTO: 5.4 THOU/UL (ref 1.4–6.5)
NEUTROPHILS NFR BLD AUTO: 60.3 % (ref 42–75)
PLATELET # BLD AUTO: 156 THOU/UL (ref 130–400)
POTASSIUM SERPL-SCNC: 4.8 MMOL/L (ref 3.5–5.1)
PROT UR STRIP.AUTO-MCNC: 100 MG/DL
RBC # BLD AUTO: 3.01 MILL/UL (ref 4.2–5.4)
RBC UR QL AUTO: (no result) HPF (ref 0–3)
SODIUM SERPL-SCNC: 138 MMOL/L (ref 136–145)
TROPONIN I SERPL DL<=0.01 NG/ML-MCNC: 0.02 NG/ML (ref ?–0.03)
TROPONIN I SERPL DL<=0.01 NG/ML-MCNC: 0.02 NG/ML (ref ?–0.03)
TROPONIN I SERPL DL<=0.01 NG/ML-MCNC: 0.03 NG/ML (ref ?–0.03)
TROPONIN I SERPL DL<=0.01 NG/ML-MCNC: 0.04 NG/ML (ref ?–0.03)
WBC # BLD AUTO: 9 THOU/UL (ref 4.8–10.8)
WBC UR QL AUTO: (no result) HPF (ref 0–3)

## 2019-12-08 RX ADMIN — DOCUSATE SODIUM 50 MG AND SENNOSIDES 8.6 MG SCH TAB: 8.6; 5 TABLET, FILM COATED ORAL at 08:30

## 2019-12-08 RX ADMIN — Medication SCH ML: at 21:04

## 2019-12-08 RX ADMIN — HYDROCODONE BITARTRATE AND ACETAMINOPHEN PRN TAB: 5; 325 TABLET ORAL at 07:25

## 2019-12-08 RX ADMIN — ASPIRIN AND EXTENDED-RELEASE DIPYRIDAMOLE SCH: 25; 200 CAPSULE ORAL at 13:50

## 2019-12-08 RX ADMIN — MOMETASONE FUROATE AND FORMOTEROL FUMARATE DIHYDRATE SCH PUFF: 100; 5 AEROSOL RESPIRATORY (INHALATION) at 19:13

## 2019-12-08 RX ADMIN — Medication SCH ML: at 08:30

## 2019-12-08 RX ADMIN — HYDROCODONE BITARTRATE AND ACETAMINOPHEN PRN TAB: 5; 325 TABLET ORAL at 11:41

## 2019-12-08 RX ADMIN — DOCUSATE SODIUM 50 MG AND SENNOSIDES 8.6 MG SCH TAB: 8.6; 5 TABLET, FILM COATED ORAL at 20:58

## 2019-12-08 NOTE — CON
DATE OF CONSULTATION:  12/08/2019



REASON FOR CONSULTATION:  Peripheral edema.



PRIMARY CARDIOLOGIST:  Dr. Cr Stack.



HISTORY OF PRESENT ILLNESS:  Ms. Myrtle Leach is a delightful 85-year-old woman.

She has a history of hypertension and renal insufficiency.  She was admitted to the

hospital on this occasion with increasing and refractory edema to the lower

extremities.  She has been given intravenous Lasix this morning and had some

improvement with that. 



The patient also has history of obstructive sleep apnea.  She did not report to me

significant breathing trouble, just increasing amounts of lower extremity edema and

then finally a break in the skin, because the skin was very swollen and tight.  She

also has some venous stasis ulcers. 



The patient is already feeling better after the intravenous Lasix.



MEDICATIONS:  At home;

1. Aspirin.

2. Dipyridamole.

3. Clonidine patch.

4. Coreg 25 mg twice a day.

5. Lisinopril 20 mg once a day.

6. Furosemide 40 mg a day.

7. Iron.

8. Breo Ellipta.

9. Isosorbide.

10. Benadryl.



REVIEW OF SYSTEMS:  CONSTITUTIONAL:  Positive for weakness and fatigue. 

VISION:  No changes. 

HEARING:  No changes. 

PULMONARY:  Not short of breath.  No chest pain. 

GASTROINTESTINAL:  No nausea, vomiting, or diarrhea. 

SKIN:  Break in the skin is outlined above.



ALLERGIES:  IODINE AND ZOSTER VACCINE LIVE.



PHYSICAL EXAMINATION:

GENERAL:  This is a pleasant 85-year-old woman in no distress. 

VITAL SIGNS:  Blood pressure 159/72, pulse 66 and regular. 

EYES:  Sclerae nonicteric. 

MOUTH:  Mucous membranes moist. 

NECK:  Supple.  No lymphadenopathy. 

LUNGS:  Clear. 

CARDIAC:  Normal S1, normal S2.  There is no murmur, rub, or gallop. 

ABDOMEN:  Obese, nontender. 

EXTREMITIES:  No clubbing or cyanosis.  There is severe peripheral edema bilateral.



LABORATORY DATA:  Her creatinine is 1.67, estimated GFR is 35.  Stage 3 renal

failure. 



Recent cardiac catheterization, minimal plaque in the LAD, otherwise no obstructive

stenosis.  No left ventriculogram was done due to renal insufficiency. 



ASSESSMENT:  

1. Peripheral edema.  The chart indicates previous diagnosis of diastolic heart

failure. 

2. Stage 3 renal failure.

3. History of hypertension.



PLAN:  

1. Continue intravenous diuretics.

2. Carvedilol dose has been reduced.

3. Dr. Stack to check the patient tomorrow.







Job ID:  [id number]

## 2019-12-08 NOTE — PDOC.HHP
Hospitalist HPI





- History of Present Illness


Pedal edema


History of Present Illness: 


Patient is an 85 year old female with PMH HTN, CKD III, anxiety, depression, CAD

, ISIS who presented to ED for new onset rapidly worsening pedal edema which 

family states began around . She saw PCP and [facility] and was 

placed on PO lasix, however it appears to not have slowed down the swelling at 

all. She normally sees Dr Stack of cardiology, who performed heart 

catheterization 3 weeks ago without finding stenosis severe enough to sent. She 

also sees Dr Torre for PCP. Patint denies current chest pain or shortness of 

breath. She recieved lasix and aspirin in ED and admitted to [facility] hospitalist 

service. also of note, she has developed painful wounds on legs that are 

worsening as edema gets worse and appear venous stasis ulcers. they were 

attempting to get home health wound care set up through pcp .





Hospitalist ROS





- Review of Systems


Constitutional: denies: fever, chills


Eyes: denies: vision change, redness


ENT: denies: mouth swelling, throat pain


Respiratory: reports: cough, shortness of breath, SOB with excertion.  denies: 

dry, wheezing


Cardiovascular: reports: edema.  denies: chest pain, palpitations


Gastrointestinal: denies: nausea, vomiting


Genitourinary: denies: dysuria, frequency


Musculoskeletal: denies: neck pain, shoulder pain


Skin: reports: lesions (on lower extremities).  denies: rash


Neurological: denies: weakness, numbness


All other systems reviewed; all pertinent +/- noted in HPI/Subj





Hospitalist History





- Past Medical History


Other Medical History: 





HTN


CHF


asthma





- Past Surgical History


Other Surgical History: 





bilateral knee replacement


cataract


hysterecomty


shoulder surgery





- Family History


Family History: reports: no pertinent history





- Social History


Smoking Status: Never smoker


Alcohol: reports: Rare (once a month)


Drugs: reports: none





- Exam


General Appearance: NAD, awake alert


Eye: PERRL, anicteric sclera


ENT: normocephalic atraumatic, no oropharyngeal lesions, moist mucosa


Neck: supple, symmetric, JVD


Heart: RRR, no murmur, no gallops, no rubs


Respiratory: CTAB, no wheezes, no rales, no ronchi


Gastrointestinal: soft, non-tender, normal bowel sounds, distended (edematous/

anasarca)


Gastrointestinal - other findings: starting to get stasis wound on abdomen it 

appears


Extremities: 2+ LE edema


Extremities - other findings: stasis rash and ulcers bilateral ankles


Skin - other findings: stasis rash and ulcers bilateral ankles


Neurological: cranial nerve grossly intact, normal sensation to touch, no 

weakness, no focal deficits


Musculoskeletal: normal tone, normal strength, no muscle wasting


Psychiatric: normal affect, normal behavior, A&O x 3





Hospitalist Results





- Labs


Result Diagrams: 


 19 02:29





 19 23:55


Lab results: 


 











WBC  9.0 thou/uL (4.8-10.8)   19  02:29    


 


Hgb  10.9 g/dL (12.0-16.0)  L  19  02:29    


 


Hct  31.4 % (36.0-47.0)  L  19  02:29    


 


MCV  104.0 fL (78.0-98.0)  H  19  02:29    


 


Plt Count  156 thou/uL (130-400)   19  02:29    


 


Neutrophils %  60.3 % (42.0-75.0)   19  02:29    


 


Sodium  138 mmol/L (136-145)   19  23:55    


 


Potassium  4.8 mmol/L (3.5-5.1)   19  23:55    


 


Chloride  106 mmol/L ()   19  23:55    


 


Carbon Dioxide  26 mmol/L (23-31)   19  23:55    


 


BUN  49 mg/dL (9.8-20.1)  H  19  23:55    


 


Creatinine  1.67 mg/dL (0.6-1.1)  H  19  23:55    


 


Glucose  128 mg/dL ()  H  19  23:55    


 


Calcium  8.8 mg/dL (7.8-10.44)   19  23:55    


 


Total Bilirubin  0.4 mg/dL (0.2-1.2)   19  23:55    


 


AST  17 U/L (5-34)   19  23:55    


 


ALT  10 U/L (8-55)   19  23:55    


 


Alkaline Phosphatase  85 U/L ()   19  23:55    


 


Creatine Kinase  63 U/L ()   19  23:55    


 


CK-MB (CK-2)  1.1 ng/mL (0-6.6)   19  23:54    


 


Troponin I  0.029 ng/mL (< 0.028)  H  19  03:03    


 


B-Natriuretic Peptide  86.0 pg/mL (0-100)   19  02:29    


 


Serum Total Protein  5.6 g/dL (6.0-8.3)  L  19  23:55    


 


Albumin  2.8 g/dL (3.4-4.8)  L  19  23:55    


 


Urine Ketones  Negative mg/dL (Negative)   19  01:45    


 


Urine Blood  Negative  (Negative)   19  01:45    


 


Urine Nitrite  Negative  (Negative)   19  01:45    


 


Ur Leukocyte Esterase  75 Rosa/uL (Negative)  A  19  01:45    


 


Urine RBC  0-3 HPF (0-3)   19  01:45    


 


Urine WBC  0-3 HPF (0-3)   19  01:45    


 


Ur Squamous Epith Cells  0-3 HPF (0-3)   19  01:45    


 


Urine Bacteria  Rare-Few HPF (None Seen)   19  01:45    














- EKG Interpretation


EK bpm sinus 1st degree AVB [place] interval 376 no acute ST changes or dropped beats





Hospitalist H&P A/P





- Plan


Plan: 


Patient is an 85 year old female admitted for:





# acute and chronic diastolic heart failure - pulmonology progress note in 

chart reviewed, they wrote in A&P that echo in Feb w/ normal EF, PFTs wnl, 

Stress  without inducible ischemia, cath  w/ minimal disease. note that 

I cannot review many of these reports in EMR, but will trust that this 

information is correct


- admit to telemetry


- consult cardiology


- lasix increased to 80mg IV BID


- continue asa, statin, coreg, imdur, hold lisinopril





# bilateral venous stasis rash and new venous stasis ulcers


- aggressive diuresis as above


- consult wound care nurse as well as case management in anticipation of 

needing SNF vs home health wound care





# HTN - resume home meds as indicated, PRN BP meds to be ordered





# equivocal UA - positive leuk esterase, but not a lot of bacteria, empiric 

zosyn until culture negative

## 2019-12-09 LAB
ANION GAP SERPL CALC-SCNC: 12 MMOL/L (ref 10–20)
BASOPHILS # BLD AUTO: 0.1 THOU/UL (ref 0–0.2)
BASOPHILS NFR BLD AUTO: 0.9 % (ref 0–1)
BUN SERPL-MCNC: 47 MG/DL (ref 9.8–20.1)
CALCIUM SERPL-MCNC: 8.5 MG/DL (ref 7.8–10.44)
CHLORIDE SERPL-SCNC: 103 MMOL/L (ref 98–107)
CO2 SERPL-SCNC: 26 MMOL/L (ref 23–31)
CREAT CL PREDICTED SERPL C-G-VRATE: 31 ML/MIN (ref 70–130)
EOSINOPHIL # BLD AUTO: 0.3 THOU/UL (ref 0–0.7)
EOSINOPHIL NFR BLD AUTO: 4.7 % (ref 0–10)
GLUCOSE SERPL-MCNC: 111 MG/DL (ref 83–110)
HGB BLD-MCNC: 9.9 G/DL (ref 12–16)
LYMPHOCYTES # BLD: 1.6 THOU/UL (ref 1.2–3.4)
LYMPHOCYTES NFR BLD AUTO: 22.2 % (ref 21–51)
MAGNESIUM SERPL-MCNC: 1.5 MG/DL (ref 1.6–2.6)
MCH RBC QN AUTO: 33.4 PG (ref 27–31)
MCV RBC AUTO: 104 FL (ref 78–98)
MONOCYTES # BLD AUTO: 0.8 THOU/UL (ref 0.11–0.59)
MONOCYTES NFR BLD AUTO: 10.9 % (ref 0–10)
NEUTROPHILS # BLD AUTO: 4.3 THOU/UL (ref 1.4–6.5)
NEUTROPHILS NFR BLD AUTO: 61.2 % (ref 42–75)
PLATELET # BLD AUTO: 160 THOU/UL (ref 130–400)
POTASSIUM SERPL-SCNC: 4.2 MMOL/L (ref 3.5–5.1)
RBC # BLD AUTO: 2.97 MILL/UL (ref 4.2–5.4)
SODIUM SERPL-SCNC: 137 MMOL/L (ref 136–145)
WBC # BLD AUTO: 7 THOU/UL (ref 4.8–10.8)

## 2019-12-09 RX ADMIN — Medication SCH ML: at 19:17

## 2019-12-09 RX ADMIN — Medication SCH ML: at 08:26

## 2019-12-09 RX ADMIN — DOCUSATE SODIUM 50 MG AND SENNOSIDES 8.6 MG SCH TAB: 8.6; 5 TABLET, FILM COATED ORAL at 19:15

## 2019-12-09 RX ADMIN — MOMETASONE FUROATE AND FORMOTEROL FUMARATE DIHYDRATE SCH PUFF: 100; 5 AEROSOL RESPIRATORY (INHALATION) at 10:56

## 2019-12-09 RX ADMIN — MOMETASONE FUROATE AND FORMOTEROL FUMARATE DIHYDRATE SCH PUFF: 100; 5 AEROSOL RESPIRATORY (INHALATION) at 18:56

## 2019-12-09 RX ADMIN — DOCUSATE SODIUM 50 MG AND SENNOSIDES 8.6 MG SCH TAB: 8.6; 5 TABLET, FILM COATED ORAL at 08:24

## 2019-12-09 RX ADMIN — ASPIRIN AND EXTENDED-RELEASE DIPYRIDAMOLE SCH CAP: 25; 200 CAPSULE ORAL at 08:25

## 2019-12-09 RX ADMIN — CEFTRIAXONE SCH MLS: 1 INJECTION, POWDER, FOR SOLUTION INTRAMUSCULAR; INTRAVENOUS at 11:56

## 2019-12-09 NOTE — PDOC.HOSPP
- Subjective


Encounter Date: 12/09/19


Encounter Time: 10:30


Subjective: 





pt up in bed no complains





- Objective


Vital Signs & Weight: 


 Vital Signs (12 hours)











  Temp Pulse Pulse Pulse Resp BP BP


 


 12/09/19 18:56   72    16  


 


 12/09/19 18:13       195/87 H 


 


 12/09/19 15:05  98.5 F  69    20  


 


 12/09/19 14:45    64  82    134/62


 


 12/09/19 11:05  99.0 F  72    20  


 


 12/09/19 10:56   75    16  


 


 12/09/19 08:00  98.6 F  75    18  














  BP BP Pulse Ox


 


 12/09/19 18:56    95


 


 12/09/19 18:13   


 


 12/09/19 15:05   159/65 H  94 L


 


 12/09/19 14:45  146/66 H  


 


 12/09/19 11:05   174/70 H  97


 


 12/09/19 10:56   


 


 12/09/19 08:00   146/67 H  93 L








 Weight











Admit Weight                   249 lb 1 oz


 


Weight                         247 lb 4.8 oz














I&O: 


 











 12/08/19 12/09/19 12/10/19





 06:59 06:59 06:59


 


Intake Total  1210 


 


Output Total  680 


 


Balance  530 











Result Diagrams: 


 12/09/19 04:28





 12/09/19 04:28





Hospitalist ROS





- Review of Systems


Cardiovascular: denies: chest pain, palpitations, orthopnea, paroxysmal noc. 

dyspnea, edema, light headedness, other


Gastrointestinal: denies: nausea, vomiting, abdominal pain, diarrhea, 

constipation, melena, hematochezia, other





- Medication


Medications: 


Active Medications











Generic Name Dose Route Start Last Admin





  Trade Name Freq  PRN Reason Stop Dose Admin


 


Hydrocodone Bitart/Acetaminophen  1 tab  12/08/19 06:22  12/08/19 11:41





  Norco 5/325  PO   1 tab





  Q4H PRN   Administration





  Moderate Pain (4-6)   





     





     





     


 


Allopurinol  300 mg  12/08/19 09:00  12/09/19 08:24





  Zyloprim  PO   300 mg





  DAILY CORRINE   Administration





     





     





     





     


 


Atorvastatin Calcium  10 mg  12/08/19 21:00  12/09/19 19:15





  Lipitor  PO   10 mg





  HS CORRINE   Administration





     





     





     





     


 


Carvedilol  6.25 mg  12/08/19 08:00  12/09/19 18:13





  Coreg  PO   6.25 mg





  BID-WM CORRINE   Administration





     





     





     





     


 


Clonidine  0.1 mg  12/08/19 06:20  12/09/19 03:22





  Catapres  PO   0.1 mg





  Q4H PRN   Administration





  SBP > 160 use second   





     





     





     


 


Dipyridamole/Aspirin  1 cap  12/08/19 09:00  12/09/19 08:25





  Aggrenox  PO   1 cap





  DAILY CORRINE   Administration





     





     





     





     


 


Duloxetine HCl  60 mg  12/08/19 09:00  12/09/19 08:24





  Cymbalta  PO   60 mg





  DAILY CORRINE   Administration





     





     





     





     


 


Enoxaparin Sodium  40 mg  12/08/19 09:00  12/09/19 08:25





  Lovenox  SC   40 mg





  0900 CORRINE   Administration





     





     





     





     


 


Gabapentin  100 mg  12/08/19 09:00  12/09/19 08:24





  Neurontin  PO   100 mg





  DAILY CORRINE   Administration





     





     





     





     


 


Hydralazine HCl  10 mg  12/08/19 06:20  12/08/19 07:42





  Apresoline  SLOW IVP   10 mg





  Q6H PRN   Administration





  SBP GREATER THAN 160   





     





     





     


 


Ceftriaxone Sodium 1 gm/  100 mls @ 200 mls/hr  12/09/19 12:00  12/09/19 11:56





  Sodium Chloride  IVPB   100 mls





  1200 CORRINE   Administration





     





     





     





     


 


Isosorbide Mononitrate  60 mg  12/08/19 09:00  12/09/19 08:24





  Imdur  PO   60 mg





  DAILY CORRINE   Administration





     





     





     





     


 


Mometasone Furoate/Formoterol Fumar  2 puff  12/08/19 18:30  12/09/19 18:56





  Dulera 100 Mcg/5 Mcg Inhaler  INH   2 puff





  BID-RT CORRINE   Administration





     





     





     





     


 


Ondansetron HCl  4 mg  12/08/19 06:20  12/08/19 10:15





  Zofran  IVP   4 mg





  Q6H PRN   Administration





  Nausea/Vomiting use 1st   





     





     





     


 


Pantoprazole Sodium  40 mg  12/08/19 09:00  12/09/19 08:26





  Protonix  PO   40 mg





  DAILY CORRINE   Administration





     





     





     





     


 


Senna/Docusate Sodium  1 tab  12/08/19 09:00  12/09/19 19:15





  Senokot S  PO   1 tab





  BID CORRINE   Administration





     





     





     





     


 


Sodium Chloride  10 ml  12/08/19 09:00  12/09/19 19:17





  Flush - Normal Saline  IVF   10 ml





  Q12HR CORRINE   Administration





     





     





     





     














- Exam


Heart: negative: RRR, no murmur, no gallops, no rubs, normal peripheral pulses, 

irregular, diminshed peripheral pulses, murmur present, II/IV, III/IV


Respiratory: negative: CTAB, no wheezes, no rales, no ronchi, normal chest 

expansion, no tachypnea, normal percussion, rales, rhonchi, tachypneic, wheezes


Gastrointestinal: negative: soft, non-tender, non-distended, normal bowel sounds

, no palpable masses, no hepatomegaly, no splenomegaly, no bruit, no guarding, 

no rigidity, tender to palpation, distended, diminished bowl sounds, voluntary 

guarding





Hosp A/P


(1) Lower extremity edema


Code(s): R60.0 - LOCALIZED EDEMA   Status: Acute   





(2) Cellulitis of lower leg


Code(s): L03.119 - CELLULITIS OF UNSPECIFIED PART OF LIMB   Status: Acute   





(3) Physical deconditioning


Code(s): R53.81 - OTHER MALAISE   Status: Acute   





(4) Hypertension


Code(s): I10 - ESSENTIAL (PRIMARY) HYPERTENSION   Status: Chronic   





(5) Obesity


Code(s): E66.9 - OBESITY, UNSPECIFIED   Status: Chronic   


Qualifiers: 


   Obesity classification: adult class 3 (BMI >= 40)   Body mass index: BMI 40.0

-44.9 





(6) Second degree AV block


Code(s): I44.1 - ATRIOVENTRICULAR BLOCK, SECOND DEGREE   Status: Acute   





- Plan





pt did complete abx as outpatient. No uti. continue diuretic due to lower ext 

edema. recent cardiac cath no significant stenosis.  second degree block noted. 

will defer to cardio. pt asymptomatic.

## 2019-12-10 VITALS — DIASTOLIC BLOOD PRESSURE: 70 MMHG | SYSTOLIC BLOOD PRESSURE: 162 MMHG | TEMPERATURE: 98.3 F

## 2019-12-10 LAB
ANION GAP SERPL CALC-SCNC: 12 MMOL/L (ref 10–20)
BASOPHILS # BLD AUTO: 0 THOU/UL (ref 0–0.2)
BASOPHILS NFR BLD AUTO: 0.5 % (ref 0–1)
BUN SERPL-MCNC: 45 MG/DL (ref 9.8–20.1)
CALCIUM SERPL-MCNC: 8.6 MG/DL (ref 7.8–10.44)
CHLORIDE SERPL-SCNC: 103 MMOL/L (ref 98–107)
CO2 SERPL-SCNC: 27 MMOL/L (ref 23–31)
CREAT CL PREDICTED SERPL C-G-VRATE: 31 ML/MIN (ref 70–130)
EOSINOPHIL # BLD AUTO: 0.3 THOU/UL (ref 0–0.7)
EOSINOPHIL NFR BLD AUTO: 4.6 % (ref 0–10)
GLUCOSE SERPL-MCNC: 102 MG/DL (ref 83–110)
HGB BLD-MCNC: 9.8 G/DL (ref 12–16)
LYMPHOCYTES # BLD: 1.8 THOU/UL (ref 1.2–3.4)
LYMPHOCYTES NFR BLD AUTO: 27.4 % (ref 21–51)
MAGNESIUM SERPL-MCNC: 1.6 MG/DL (ref 1.6–2.6)
MCH RBC QN AUTO: 34.2 PG (ref 27–31)
MCV RBC AUTO: 104 FL (ref 78–98)
MONOCYTES # BLD AUTO: 0.7 THOU/UL (ref 0.11–0.59)
MONOCYTES NFR BLD AUTO: 10.5 % (ref 0–10)
NEUTROPHILS # BLD AUTO: 3.8 THOU/UL (ref 1.4–6.5)
NEUTROPHILS NFR BLD AUTO: 57.1 % (ref 42–75)
PLATELET # BLD AUTO: 156 THOU/UL (ref 130–400)
POTASSIUM SERPL-SCNC: 4.3 MMOL/L (ref 3.5–5.1)
RBC # BLD AUTO: 2.86 MILL/UL (ref 4.2–5.4)
SODIUM SERPL-SCNC: 138 MMOL/L (ref 136–145)
WBC # BLD AUTO: 6.7 THOU/UL (ref 4.8–10.8)

## 2019-12-10 RX ADMIN — CEFTRIAXONE SCH MLS: 1 INJECTION, POWDER, FOR SOLUTION INTRAMUSCULAR; INTRAVENOUS at 13:08

## 2019-12-10 RX ADMIN — MOMETASONE FUROATE AND FORMOTEROL FUMARATE DIHYDRATE SCH PUFF: 100; 5 AEROSOL RESPIRATORY (INHALATION) at 07:45

## 2019-12-10 RX ADMIN — DOCUSATE SODIUM 50 MG AND SENNOSIDES 8.6 MG SCH: 8.6; 5 TABLET, FILM COATED ORAL at 08:31

## 2019-12-10 RX ADMIN — ASPIRIN AND EXTENDED-RELEASE DIPYRIDAMOLE SCH CAP: 25; 200 CAPSULE ORAL at 08:32

## 2019-12-10 RX ADMIN — Medication SCH ML: at 08:32

## 2019-12-10 NOTE — PDOC.CPN
- Subjective


Date: 12/10/19


Time: 10:30


Interval history: 





no new complaints. resting comfortably





- Review of Systems


General: denies: fever/chills, weight/appetite/sleep changes, night sweats, 

fatigue


Respiratory: denies: cough, congestion, shortness of breath, exercise 

intolerance


Gastrointestinal: denies: nausea, vomiting, diarrhea, constipation, abd pain, 

GI bleeding


Musculoskeletal: reports: swelling


Neurological: denies: numbness, syncope, seizure, weakness





- Objective


Allergies/Adverse Reactions: 


 Allergies











Allergy/AdvReac Type Severity Reaction Status Date / Time


 


iodine Allergy Severe Swollen Verified 12/08/19 13:41





   Lips  


 


primidone [From Mysoline] Allergy Severe  Verified 12/08/19 13:41


 


zoster vaccine live Allergy Severe Anaphylaxis Verified 12/08/19 13:41





[From ZOSTAVAX (PF)]     











Visit Medications: 


 Current Medications





Acetaminophen (Tylenol)  650 mg PO Q4H PRN


   PRN Reason: Headache/Fever/Mild Pain (1-3)


Hydrocodone Bitart/Acetaminophen (Norco 5/325)  1 tab PO Q4H PRN


   PRN Reason: Moderate Pain (4-6)


   Last Admin: 12/08/19 11:41 Dose:  1 tab


Allopurinol (Zyloprim)  300 mg PO DAILY [facility]


   Last Admin: 12/10/19 08:31 Dose:  300 mg


Atorvastatin Calcium (Lipitor)  10 mg PO HS [facility]


   Last Admin: 12/09/19 19:15 Dose:  10 mg


Bacitracin Zinc (Bacitracin Zinc Ointment 30 Gm)  0 gm TOP DAILY [facility]


   Last Admin: 12/10/19 08:32 Dose:  1 appful


Bisacodyl (Dulcolax)  10 mg PO DAILYPRN PRN


   PRN Reason: Constipation


Bisacodyl (Dulcolax)  10 mg SC DAILYPRN PRN


   PRN Reason: Constipation


Carvedilol (Coreg)  25 mg PO BID-WM [facility]


Clonidine (Catapres)  0.1 mg PO Q4H PRN


   PRN Reason: SBP > 160 use second


   Last Admin: 12/10/19 10:08 Dose:  0.1 mg


Clonidine (Catapres-Tts-2)  0.2 mg TD Q7DAYS [facility]


   Last Admin: 12/10/19 10:02 Dose:  0.2 mg


Dipyridamole/Aspirin (Aggrenox)  1 cap PO DAILY [facility]


   Last Admin: 12/10/19 08:32 Dose:  1 cap


Duloxetine HCl (Cymbalta)  60 mg PO DAILY [facility]


   Last Admin: 12/10/19 08:31 Dose:  60 mg


Enoxaparin Sodium (Lovenox)  40 mg SC 0900 [facility]


   Last Admin: 12/10/19 08:32 Dose:  40 mg


Gabapentin (Neurontin)  100 mg PO DAILY [facility]


   Last Admin: 12/10/19 08:31 Dose:  100 mg


Hydralazine HCl (Apresoline)  10 mg SLOW IVP Q6H PRN


   PRN Reason: SBP GREATER THAN 160


   Last Admin: 12/08/19 07:42 Dose:  10 mg


Promethazine HCl 12.5 mg/ (Sodium Chloride)  50.5 mls @ 202 mls/hr IVPB Q6H PRN


   PRN Reason: Nausea/vomiting use second


Ceftriaxone Sodium 1 gm/ (Sodium Chloride)  100 mls @ 200 mls/hr IVPB 1200 [facility]


   Last Admin: 12/09/19 11:56 Dose:  100 mls


Isosorbide Mononitrate (Imdur)  60 mg PO BID [facility]


Mometasone Furoate/Formoterol Fumar (Dulera 100 Mcg/5 Mcg Inhaler)  2 puff INH 

BID-RT [facility]


   Last Admin: 12/10/19 07:45 Dose:  2 puff


Morphine Sulfate (Morphine)  2 mg SLOW IVP Q4H PRN


   PRN Reason:  BREAKTHROUGH PAIN


Ondansetron HCl (Zofran)  4 mg IVP Q6H PRN


   PRN Reason: Nausea/Vomiting use 1st


   Last Admin: 12/08/19 10:15 Dose:  4 mg


Pantoprazole Sodium (Protonix)  40 mg PO DAILY [facility]


   Last Admin: 12/10/19 08:31 Dose:  40 mg


Senna/Docusate Sodium (Senokot S)  1 tab PO BID [facility]


   Last Admin: 12/10/19 08:31 Dose:  Not Given


Sodium Chloride (Flush - Normal Saline)  10 ml IVF Q12HR [facility]


   Last Admin: 12/10/19 08:32 Dose:  10 ml


Sodium Chloride (Flush - Normal Saline)  10 ml IVF PRN PRN


   PRN Reason: Saline Flush








Vital Signs & Weight: 


 Vital Signs











  Temp Pulse Resp BP BP Pulse Ox


 


 12/10/19 10:08     196/81 H  


 


 12/10/19 08:00  98.6 F  77  17   199/84 H  93 L


 


 12/10/19 07:45   73  16    98


 


 12/10/19 03:08  98.2 F  75  18   159/94 H  98


 


 12/10/19 00:00   74    183/80 H 


 


 12/09/19 23:39     183/80 H  








 











Admit Weight                   249 lb 1 oz


 


Weight                         240 lb 5 oz

















- Physical Exam


General: alert & oriented x3, appears well, no apparent distress


HEENT: mucus membranes moist, normocephaly


Neck: supple neck, no JVD/HJR, no masses


Cardiac: regular rate and rhythm


Lungs: clear to auscultation


Neuro: grossly intact


Abdomen: soft, non-tender


Extremities: no cyanosis, 2+ LE edema


Skin: clear


Musculoskeletal: normal range of motion, no pain





- Labs


Result Diagrams: 


 12/10/19 04:24





 12/10/19 04:24


 Troponin/CKMB











CK-MB (CK-2)  1.1 ng/mL (0-6.6)   12/07/19  23:54    


 


Troponin I  0.016 ng/mL (< 0.028)   12/08/19  18:25    














- Telemetry


Sinus rhythms and dysrhythmias: sinus rhythm





- Assessment/Plan


Assessment/Plan: 





1. Edema


2. ALTAGRACIA/CKD


3. Chronic diastolic CHF


4. HTN





Needs improved BP control. Increase Coreg. agree with nitrates. Hold 

nephrotoxic agents for the moment as she's dry. Edema probably multifactorial 

intially related to diet, obesity, probable ISIS, Norvasc S/E and venous reflux. 

increase Coreg. Could consider cardizem for HTN. Hydralazine another option but 

will cause edema again.

## 2019-12-11 NOTE — PQF
[employer] Winform ViewerSHERI OCHOA              
                              FANI DE PA-C

[id number]                                                             [facility]

[id number]                             

                                   

CLINICAL DOCUMENTATION CLARIFICATION FORM:  POST DISCHARGE



Addendum to original discharge summary date:  __________________________________
____



Late entry note date:  _________________________________________________________
__











DATE:12/11/2019                                               ATTN:FANI DE PA-C



Please exercise your independent, professional judgment in responding to the 
clarification form. 

Clinical indicators are provided on the bottom of this form for your review



Please check appropriate box(s):

Conflicting documentation was noted in the Medical Record, please clarify if 
patient is being treated/monitored for:

[  ]  Acute on Chronic Diastolic heart failure

[  ] Chronic Diastolic heart failure

[  ] Other diagnosis ___________

[  ] Unable to determine

In addition, please specify:

Present on Admission (POA):  [  ] Yes             [  ] No             [  ] 
Unable to determine



For continuity of documentation, please document condition throughout progress 
notes and discharge summary.  Thank You.



CLINICAL INDICATORS - SIGNS / SYMPTOMS/ LABS 

Worsening Pedel edema -  Documented in h&P on 12/08 by Siri Solares MD

Acute on Chronic Diastolic heart failure - Documented in h&P on 12/08 by Siri Solares MD

Chronic Diastolic heart failure - Documented in Cardiology Pns on 12/10 by  
FANI DE PA-C

They wrote in A&P that echo in Feb  - Documented in h&P on 12/08 by Siri Solares MD

Bilateral venous stasis ulcer - Documented in h&P on 12/08 by Siri Solares MD





RISK FACTORS

HTN

CKD

CAD



TREATMENT

Consult cardiology

Lasix Increased to 80 mg IV BID - Documented in h&P on 12/08 by Siri Solares MD

Aggressive diuresis - Documented in h&P on 12/08 by Siri Solares MD



[employer]

(This form is maintained as a part of the permanent medical record)

2015 [employer].  All Rights Reserved

[email or web address]    [not 
provided]

                                                              



 













MTDD

## 2019-12-21 ENCOUNTER — HOSPITAL ENCOUNTER (INPATIENT)
Dept: HOSPITAL 92 - ERS | Age: 84
LOS: 17 days | Discharge: HOSPICE | DRG: 207 | End: 2020-01-07
Attending: INTERNAL MEDICINE | Admitting: INTERNAL MEDICINE
Payer: MEDICARE

## 2019-12-21 VITALS — BODY MASS INDEX: 40.8 KG/M2

## 2019-12-21 DIAGNOSIS — G47.33: ICD-10-CM

## 2019-12-21 DIAGNOSIS — Z99.89: ICD-10-CM

## 2019-12-21 DIAGNOSIS — Z90.710: ICD-10-CM

## 2019-12-21 DIAGNOSIS — K59.00: ICD-10-CM

## 2019-12-21 DIAGNOSIS — J96.00: ICD-10-CM

## 2019-12-21 DIAGNOSIS — R65.20: ICD-10-CM

## 2019-12-21 DIAGNOSIS — I50.32: ICD-10-CM

## 2019-12-21 DIAGNOSIS — J15.212: ICD-10-CM

## 2019-12-21 DIAGNOSIS — J20.5: Primary | ICD-10-CM

## 2019-12-21 DIAGNOSIS — Z86.73: ICD-10-CM

## 2019-12-21 DIAGNOSIS — I34.0: ICD-10-CM

## 2019-12-21 DIAGNOSIS — E78.5: ICD-10-CM

## 2019-12-21 DIAGNOSIS — Z91.041: ICD-10-CM

## 2019-12-21 DIAGNOSIS — N17.9: ICD-10-CM

## 2019-12-21 DIAGNOSIS — J44.0: ICD-10-CM

## 2019-12-21 DIAGNOSIS — I13.0: ICD-10-CM

## 2019-12-21 DIAGNOSIS — G93.41: ICD-10-CM

## 2019-12-21 DIAGNOSIS — J44.1: ICD-10-CM

## 2019-12-21 DIAGNOSIS — I48.91: ICD-10-CM

## 2019-12-21 DIAGNOSIS — E66.9: ICD-10-CM

## 2019-12-21 DIAGNOSIS — Z98.890: ICD-10-CM

## 2019-12-21 DIAGNOSIS — I44.0: ICD-10-CM

## 2019-12-21 DIAGNOSIS — A41.9: ICD-10-CM

## 2019-12-21 DIAGNOSIS — B26.9: ICD-10-CM

## 2019-12-21 DIAGNOSIS — I48.0: ICD-10-CM

## 2019-12-21 DIAGNOSIS — Z96.653: ICD-10-CM

## 2019-12-21 DIAGNOSIS — K11.20: ICD-10-CM

## 2019-12-21 DIAGNOSIS — Z88.7: ICD-10-CM

## 2019-12-21 DIAGNOSIS — Z90.49: ICD-10-CM

## 2019-12-21 DIAGNOSIS — I16.0: ICD-10-CM

## 2019-12-21 DIAGNOSIS — B95.61: ICD-10-CM

## 2019-12-21 DIAGNOSIS — Z51.5: ICD-10-CM

## 2019-12-21 DIAGNOSIS — D53.9: ICD-10-CM

## 2019-12-21 DIAGNOSIS — M10.9: ICD-10-CM

## 2019-12-21 DIAGNOSIS — M79.7: ICD-10-CM

## 2019-12-21 DIAGNOSIS — N18.4: ICD-10-CM

## 2019-12-21 DIAGNOSIS — E87.1: ICD-10-CM

## 2019-12-21 DIAGNOSIS — R80.9: ICD-10-CM

## 2019-12-21 DIAGNOSIS — F32.9: ICD-10-CM
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
## 2019-12-21 DIAGNOSIS — J45.901: ICD-10-CM

## 2019-12-21 DIAGNOSIS — I51.89: ICD-10-CM

## 2019-12-21 LAB
ALBUMIN SERPL BCG-MCNC: 2.9 G/DL (ref 3.4–4.8)
ALP SERPL-CCNC: 76 U/L (ref 40–110)
ALT SERPL W P-5'-P-CCNC: 9 U/L (ref 8–55)
ANION GAP SERPL CALC-SCNC: 12 MMOL/L (ref 10–20)
AST SERPL-CCNC: 19 U/L (ref 5–34)
BACTERIA UR QL AUTO: (no result) HPF
BASOPHILS # BLD AUTO: 0 THOU/UL (ref 0–0.2)
BASOPHILS NFR BLD AUTO: 0.3 % (ref 0–1)
BILIRUB SERPL-MCNC: 0.4 MG/DL (ref 0.2–1.2)
BUN SERPL-MCNC: 38 MG/DL (ref 9.8–20.1)
CALCIUM SERPL-MCNC: 8.8 MG/DL (ref 7.8–10.44)
CHLORIDE SERPL-SCNC: 103 MMOL/L (ref 98–107)
CK MB SERPL-MCNC: 1.3 NG/ML (ref 0–6.6)
CO2 SERPL-SCNC: 24 MMOL/L (ref 23–31)
CREAT CL PREDICTED SERPL C-G-VRATE: 0 ML/MIN (ref 70–130)
EOSINOPHIL # BLD AUTO: 0 THOU/UL (ref 0–0.7)
EOSINOPHIL NFR BLD AUTO: 0 % (ref 0–10)
GLOBULIN SER CALC-MCNC: 2.9 G/DL (ref 2.4–3.5)
GLUCOSE SERPL-MCNC: 170 MG/DL (ref 83–110)
GLUCOSE UR STRIP-MCNC: 50 MG/DL
HGB BLD-MCNC: 10.4 G/DL (ref 12–16)
LYMPHOCYTES # BLD: 1 THOU/UL (ref 1.2–3.4)
LYMPHOCYTES NFR BLD AUTO: 27.1 % (ref 21–51)
MCH RBC QN AUTO: 33.7 PG (ref 27–31)
MCV RBC AUTO: 102 FL (ref 78–98)
MONOCYTES # BLD AUTO: 0.5 THOU/UL (ref 0.11–0.59)
MONOCYTES NFR BLD AUTO: 12.2 % (ref 0–10)
NEUTROPHILS # BLD AUTO: 2.3 THOU/UL (ref 1.4–6.5)
NEUTROPHILS NFR BLD AUTO: 60.4 % (ref 42–75)
PLATELET # BLD AUTO: 191 THOU/UL (ref 130–400)
POTASSIUM SERPL-SCNC: 4.3 MMOL/L (ref 3.5–5.1)
PROT UR STRIP.AUTO-MCNC: 300 MG/DL
RBC # BLD AUTO: 3.09 MILL/UL (ref 4.2–5.4)
RBC UR QL AUTO: (no result) HPF (ref 0–3)
SODIUM SERPL-SCNC: 135 MMOL/L (ref 136–145)
TROPONIN I SERPL DL<=0.01 NG/ML-MCNC: 0.04 NG/ML (ref ?–0.03)
TROPONIN I SERPL DL<=0.01 NG/ML-MCNC: 0.04 NG/ML (ref ?–0.03)
WBC # BLD AUTO: 3.7 THOU/UL (ref 4.8–10.8)
WBC UR QL AUTO: (no result) HPF (ref 0–3)

## 2019-12-21 PROCEDURE — 36416 COLLJ CAPILLARY BLOOD SPEC: CPT

## 2019-12-21 PROCEDURE — 87076 CULTURE ANAEROBE IDENT EACH: CPT

## 2019-12-21 PROCEDURE — 82805 BLOOD GASES W/O2 SATURATION: CPT

## 2019-12-21 PROCEDURE — 80053 COMPREHEN METABOLIC PANEL: CPT

## 2019-12-21 PROCEDURE — 94640 AIRWAY INHALATION TREATMENT: CPT

## 2019-12-21 PROCEDURE — 71250 CT THORAX DX C-: CPT

## 2019-12-21 PROCEDURE — 85379 FIBRIN DEGRADATION QUANT: CPT

## 2019-12-21 PROCEDURE — 82550 ASSAY OF CK (CPK): CPT

## 2019-12-21 PROCEDURE — 80202 ASSAY OF VANCOMYCIN: CPT

## 2019-12-21 PROCEDURE — 70450 CT HEAD/BRAIN W/O DYE: CPT

## 2019-12-21 PROCEDURE — 87070 CULTURE OTHR SPECIMN AEROBIC: CPT

## 2019-12-21 PROCEDURE — 80069 RENAL FUNCTION PANEL: CPT

## 2019-12-21 PROCEDURE — 93306 TTE W/DOPPLER COMPLETE: CPT

## 2019-12-21 PROCEDURE — 86735 MUMPS ANTIBODY: CPT

## 2019-12-21 PROCEDURE — 96374 THER/PROPH/DIAG INJ IV PUSH: CPT

## 2019-12-21 PROCEDURE — 83880 ASSAY OF NATRIURETIC PEPTIDE: CPT

## 2019-12-21 PROCEDURE — 84443 ASSAY THYROID STIM HORMONE: CPT

## 2019-12-21 PROCEDURE — 84300 ASSAY OF URINE SODIUM: CPT

## 2019-12-21 PROCEDURE — 82607 VITAMIN B-12: CPT

## 2019-12-21 PROCEDURE — 94003 VENT MGMT INPAT SUBQ DAY: CPT

## 2019-12-21 PROCEDURE — 80048 BASIC METABOLIC PNL TOTAL CA: CPT

## 2019-12-21 PROCEDURE — 82340 ASSAY OF CALCIUM IN URINE: CPT

## 2019-12-21 PROCEDURE — 82746 ASSAY OF FOLIC ACID SERUM: CPT

## 2019-12-21 PROCEDURE — 96375 TX/PRO/DX INJ NEW DRUG ADDON: CPT

## 2019-12-21 PROCEDURE — 81001 URINALYSIS AUTO W/SCOPE: CPT

## 2019-12-21 PROCEDURE — P9047 ALBUMIN (HUMAN), 25%, 50ML: HCPCS

## 2019-12-21 PROCEDURE — 84156 ASSAY OF PROTEIN URINE: CPT

## 2019-12-21 PROCEDURE — 85025 COMPLETE CBC W/AUTO DIFF WBC: CPT

## 2019-12-21 PROCEDURE — 93010 ELECTROCARDIOGRAM REPORT: CPT

## 2019-12-21 PROCEDURE — 82570 ASSAY OF URINE CREATININE: CPT

## 2019-12-21 PROCEDURE — 87633 RESP VIRUS 12-25 TARGETS: CPT

## 2019-12-21 PROCEDURE — 84540 ASSAY OF URINE/UREA-N: CPT

## 2019-12-21 PROCEDURE — S0028 INJECTION, FAMOTIDINE, 20 MG: HCPCS

## 2019-12-21 PROCEDURE — 87205 SMEAR GRAM STAIN: CPT

## 2019-12-21 PROCEDURE — 84439 ASSAY OF FREE THYROXINE: CPT

## 2019-12-21 PROCEDURE — 87186 SC STD MICRODIL/AGAR DIL: CPT

## 2019-12-21 PROCEDURE — 94002 VENT MGMT INPAT INIT DAY: CPT

## 2019-12-21 PROCEDURE — 87077 CULTURE AEROBIC IDENTIFY: CPT

## 2019-12-21 PROCEDURE — 82553 CREATINE MB FRACTION: CPT

## 2019-12-21 PROCEDURE — 76770 US EXAM ABDO BACK WALL COMP: CPT

## 2019-12-21 PROCEDURE — 36415 COLL VENOUS BLD VENIPUNCTURE: CPT

## 2019-12-21 PROCEDURE — 71045 X-RAY EXAM CHEST 1 VIEW: CPT

## 2019-12-21 PROCEDURE — 84484 ASSAY OF TROPONIN QUANT: CPT

## 2019-12-21 PROCEDURE — 93005 ELECTROCARDIOGRAM TRACING: CPT

## 2019-12-21 RX ADMIN — ASPIRIN AND EXTENDED-RELEASE DIPYRIDAMOLE SCH CAP: 25; 200 CAPSULE ORAL at 20:11

## 2019-12-21 NOTE — PDOC.HHP
Hospitalist HPI





- History of Present Illness


shortness of breath 


History of Present Illness: 











This is an 85 year old female with past medical history of hypertension, atrial 

fibrillation, possible COPD,  hyperlipidemia, TIA who presented to the 

emergency room with shortness of breath. The patient states she has been short 

of breath since Thanksgiving. Her shortness of breath is on exertion, but she 

denies chest pain. She reports increasing cough that is productive as well. . 

The patient states she follows with Dr. Christianson and started prednisone yesterday 

but hasn't noticed significant improvement yet. She denies fevers, but had some 

chills with a runny nose and a low grade temp of 99 this morning.  The patient 

denies sick contacts.  





The patient also reports that her blood pressure has been getting worst lately. 

She is taking coreg and imdur. Her lasix and lisinopril  was discontinued 

recently due to kidney issues. She also stopped amlodipine due to peripheral 

edema. Her PCP sent her a referral to see Dr. Gonzales due to worsening kidney 

function. Her  appointment was supposed to be this coming Monday but now she is 

in the hospital. She had a headache in the ER which is now resolved. She denies 

chest pain, tingling or numbness in her extremities or blurry vision .





ED Course: 











ER Course:  the patient presented with a BP Of 211/95.  She was given nitro 

paste, IV hydralazine, IV methylprednisolone, and duoneb treatment.  

Hydralazine 25 mg tid was ordered and BP came down to 170. Chest X ray showed 

no acute disease.  Per ER, patient was also in new onset atrial fibrillation, 

with rate controlled in the 70's. 





Hospitalist ROS





- Review of Systems


Constitutional: reports: chills.  denies: fever


Eyes: denies: pain, vision change


Respiratory: reports: cough, shortness of breath, wheezing


Cardiovascular: denies: chest pain, palpitations, orthopnea, paroxysmal noc. 

dyspnea


Gastrointestinal: denies: nausea, vomiting, abdominal pain, diarrhea, 

constipation


Genitourinary: denies: dysuria, frequency, incontinence, hematuria


Musculoskeletal: denies: neck pain


Neurological: denies: weakness, numbness





Hospitalist History





- Past Medical History


Cardiac: reports: CHF (EF 60-65%)


CNS: reports: TIA


Rheumatologic: reports: Gout





- Past Surgical History


Other Surgical History: 





Right and left knee surgery


Cholecystectomy


Right shoulde rsurgery


Eye surgery


Lens implant








- Family History


Other Family History: 





Dad has hypertension








- Social History


Smoking Status: Never smoker


Alcohol: reports: Rare (once a month)


Drugs: reports: none


Living Situation: With Family





- Exam


General Appearance: NAD, awake alert


Eye: PERRL, anicteric sclera


ENT: normocephalic atraumatic, no oropharyngeal lesions


Neck: supple, symmetric, no JVD, no thyromegaly.  negative: no carotid bruit


Heart: RRR, no murmur, no gallops, no rubs, normal peripheral pulses


Respiratory: CTAB


Respiratory - other findings: wheezing bliaterally 


Gastrointestinal: soft, non-tender, non-distended, normal bowel sounds, no 

palpable masses


Extremities: no cyanosis, no clubbing


Extremities - other findings: no pitting edema 


Skin: normal turgor, no lesions, no rashes


Skin - other findings: some hyperpigimentation of skin 


Neurological: cranial nerve grossly intact, normal sensation to touch, no focal 

deficits, no new deficit





Hospitalist Results





- Labs


Result Diagrams: 


 12/21/19 12:28





 12/21/19 12:28


Lab results: 


 











WBC  3.7 thou/uL (4.8-10.8)  L  12/21/19  12:28    


 


Hgb  10.4 g/dL (12.0-16.0)  L  12/21/19  12:28    


 


Hct  31.5 % (36.0-47.0)  L  12/21/19  12:28    


 


MCV  102.0 fL (78.0-98.0)  H  12/21/19  12:28    


 


Plt Count  191 thou/uL (130-400)   12/21/19  12:28    


 


Neutrophils %  60.4 % (42.0-75.0)   12/21/19  12:28    


 


Sodium  135 mmol/L (136-145)  L  12/21/19  12:28    


 


Potassium  4.3 mmol/L (3.5-5.1)   12/21/19  12:28    


 


Chloride  103 mmol/L ()   12/21/19  12:28    


 


Carbon Dioxide  24 mmol/L (23-31)   12/21/19  12:28    


 


BUN  38 mg/dL (9.8-20.1)  H  12/21/19  12:28    


 


Creatinine  1.99 mg/dL (0.6-1.1)  H  12/21/19  12:28    


 


Glucose  170 mg/dL ()  H  12/21/19  12:28    


 


Calcium  8.8 mg/dL (7.8-10.44)   12/21/19  12:28    


 


Total Bilirubin  0.4 mg/dL (0.2-1.2)   12/21/19  12:28    


 


AST  19 U/L (5-34)   12/21/19  12:28    


 


ALT  9 U/L (8-55)   12/21/19  12:28    


 


Alkaline Phosphatase  76 U/L ()   12/21/19  12:28    


 


CK-MB (CK-2)  1.3 ng/mL (0-6.6)   12/21/19  12:28    


 


Troponin I  0.044 ng/mL (< 0.028)  H  12/21/19  16:29    


 


B-Natriuretic Peptide  635.5 pg/mL (0-100)  H  12/21/19  12:28    


 


Serum Total Protein  5.8 g/dL (6.0-8.3)  L  12/21/19  12:28    


 


Albumin  2.9 g/dL (3.4-4.8)  L  12/21/19  12:28    














- EKG Interpretation


EKG: 





atrial fibrillation








Hospitalist H&P A/P





- Plan


Plan: 


Chest X ray: normal 





This is an 85 year old female with history of hypertension, who presented to 

the ER with shortness of breath on exertion, admitted for hypertensive urgency











#Hypertensive Urgency


#Elevated troponin 


- BP > 249 on admission, s/p nitropaste


- continue coreg and imdur


- started hydralazine 25 mg tid, continue clonidine patch q7 days 


- troponin trending up to 0.044, check one more. Order ECHO


- nephrology consult with Dr. Gonzales








Acute Bronchitis 


- s/p IV steroids in the ER


-  order albuterol prn, standing duoneb, mucinex


- continue breo


- patient reports not having clear diagnosis of COPD, but follows with Dr. Christianson 

as an outpatient








New onset atrial fibrillation- resolved


- check ECHO 


- rate controlled, continue coreg 








CAPRI on CKD


- creatinine actually improving to 1.99, baseline around 1.4 to 1.5 


- check UA 





Gout


- allopurinol








Hyponatremia


- sodium 134, mild, will monitor





Macrocytic anemia


- Hb 10.4


- check TSh, B12, folate














Code status: full code

## 2019-12-21 NOTE — RAD
EXAM:

Portable chest



PROVIDED CLINICAL HISTORY:

Dyspnea



COMPARISON:

12/7/2019



FINDINGS:

Cardiac and mediastinal silhouette is unchanged in appearance. No focal consolidation, pleural fluid 
or pneumothorax evident.



IMPRESSION:

No evidence for an acute cardiopulmonary process.



Reported By: Neville Souza 

Electronically Signed:  12/21/2019 1:59 PM

## 2019-12-21 NOTE — CON
DATE OF CONSULTATION:  



HISTORY OF PRESENT ILLNESS:  Ms. Leach is an 85-year-old black female, who was

admitted for shortness of breath.  She was seen yesterday by her PCP and the 
feeling

is that she may have some COPD exacerbation versus acute bronchitis.  She was

started on steroids and currently is now on antibiotics.  We are now consulting 
for

her labile hypertension and acute kidney injury on top of possible chronic renal

failure.  Please note, this patient was seen 2 years ago at the hospital.  At 
that

time, she was admitted for CHF secondary to diastolic dysfunction.  Workup was

suggestive that she may have underlying chronic renal failure from either

hypertensive/renovascular disease.  Please note, at that time, her renal 
ultrasound

showed that she had a small right kidney. 



REVIEW OF SYSTEMS:  Positive for cough.  Positive for mild shortness of breath.
  No

nausea.  No vomiting.  No chest pain.  No syncopal episode.  Denies any fever or

chills.  No hematochezia.  No melena.  No hematemesis.  No syncopal episode.

Appetite and energy level are fair.  No gross hematuria.  No dysuria.  No 
urinary

frequency. 



MEDICATIONS:  The patient is currently on the following medications:

1. Albuterol q.6.

2. Allopurinol 300 mg tablet once a day.

3. Aspirin 1 capsule p.o. b.i.d.

4. Atorvastatin 10 mg at bedtime.

5. Azithromycin 250 mg daily.

6. Carvedilol 25 mg p.o. b.i.d.

7. Clonidine/TTS-2 patch q.7 days.

8. Cymbalta 60 mg once a day.

9. Famotidine 20 mg tab q.a.m.

10. Hydralazine 25 mg p.o. t.i.d.

11. Imdur 60 mg p.o. b.i.d.

12. Mometasone oral inhaler as directed.

13. Prednisone 40 mg tablet once a day.



PAST MEDICAL HISTORY:  Includes the following;

1. 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

2. Longstanding hypertension.

3. Depression.

4. Status post CHF secondary to diastolic dysfunction.

5. Hyperlipidemia.

6. Benign essential tremor.

7. History of fibromyalgia.

8. Chronic bronchitis.

9. History of gout.

10. Asthma.

11. Status post CVA.

12. Status post acute kidney injury.



PAST SURGICAL HISTORY:  

1. Status post right shoulder surgery.

2. Status post bilateral knee replacement.

3. Status post cholecystectomy.

4. Status post hysterectomy.

5. Status post colonoscopy.



ALLERGIES:  SHE IS ALLERGIC TO IODINE AND ZOSTER VACCINE.



TRAUMA:  None.



IMMUNIZATIONS:  Up-to-date.



HOSPITALIZATIONS:  Please see past medical history.



FAMILY HISTORY:  No family history of ESRD.



SOCIAL HISTORY:  The patient lives in [place] area.  Four children.

.  Retired hospital aide.  Education, high school.  No IV drug abuse.  No

history of smoking.  No alcohol.  No blood transfusion. 



PHYSICAL EXAMINATION:

VITAL SIGNS:  Blood pressure is noted at 173/73 with a heart rate of 91, 
respiratory

rate 18, temperature 98.5, and pulse ox 95% on room air. 

GENERAL:  Awake, alert, obese, comfortable, not in distress. 

SKIN:  Adequate turgor. 

HEENT:  She has a pinkish conjunctivae.  Anicteric sclerae.  No neck mass.  No

carotid bruits.  No JVD. 

CHEST:  No deformities. 

LUNGS:  Decreased breath sounds.  Positive for wheezing. 

HEART:  Normal sinus rhythm.  No murmur.  No gallops.  No rubs. 

ABDOMEN:  Globular, soft, nontender.  No masses. 

EXTREMITIES:  Trace edema. 

NEUROLOGICAL:  Awake, oriented to 3 spheres.  Moving all extremities.  No 
tremors.

No asterixis.  No ataxia. 



LABORATORY DATA:  From December 21, 2019, sodium 135, potassium 4.3, chloride 
103,

carbon dioxide 24, BUN 38, creatinine 1.99, glucose 170, calcium 8.8, AST 19, 
ALT 9. 



December 20, 2019, BUN 41, creatinine 2.11.  December 16, 2019, BUN 44, 
creatinine

2.42. 



Chest x-ray, no CHF.



ASSESSMENT AND PLAN:  

1. Shortness of breath, considered chronic obstructive pulmonary disease

exacerbation/chronic bronchitis.  Currently on steroids and albuterol inhaler.

Empiric antibiotics also been started with the patient. 

2. Acute kidney injury/chronic renal failure - patient recently hospitalized for

congestive heart failure.  She has been diuresed at that time, but adjustments 
of

diuretics have been made.  Renal function is slowly improving.  Her most recent

creatinine of 1.9 is nearing baseline.  She usually runs about 1.65 mg%.  Her

chronic renal failure is secondary to hypertensive/renovascular disease.  There 
is

no indication for any dialytic intervention.  Please note, the patient has had 
renal

ultrasound back in 2017 and it showed a small right kidney. 

3. Overall I agree with current management.  Recheck basic metabolic panel and 
CBC

in a.m. 





Job ID:  [id number]



MTDD

## 2019-12-22 LAB
ANION GAP SERPL CALC-SCNC: 13 MMOL/L (ref 10–20)
BASOPHILS # BLD AUTO: 0 THOU/UL (ref 0–0.2)
BASOPHILS NFR BLD AUTO: 0.5 % (ref 0–1)
BUN SERPL-MCNC: 45 MG/DL (ref 9.8–20.1)
CALCIUM SERPL-MCNC: 8.9 MG/DL (ref 7.8–10.44)
CHLORIDE SERPL-SCNC: 103 MMOL/L (ref 98–107)
CO2 SERPL-SCNC: 24 MMOL/L (ref 23–31)
CREAT CL PREDICTED SERPL C-G-VRATE: 35 ML/MIN (ref 70–130)
EOSINOPHIL # BLD AUTO: 0 THOU/UL (ref 0–0.7)
EOSINOPHIL NFR BLD AUTO: 0.2 % (ref 0–10)
GLUCOSE SERPL-MCNC: 155 MG/DL (ref 83–110)
HGB BLD-MCNC: 10.3 G/DL (ref 12–16)
LYMPHOCYTES # BLD: 1.2 THOU/UL (ref 1.2–3.4)
LYMPHOCYTES NFR BLD AUTO: 35.6 % (ref 21–51)
MCH RBC QN AUTO: 32.9 PG (ref 27–31)
MCV RBC AUTO: 103 FL (ref 78–98)
MONOCYTES # BLD AUTO: 0.1 THOU/UL (ref 0.11–0.59)
MONOCYTES NFR BLD AUTO: 1.6 % (ref 0–10)
NEUTROPHILS # BLD AUTO: 2.1 THOU/UL (ref 1.4–6.5)
NEUTROPHILS NFR BLD AUTO: 62.1 % (ref 42–75)
PLATELET # BLD AUTO: 198 THOU/UL (ref 130–400)
POTASSIUM SERPL-SCNC: 4.5 MMOL/L (ref 3.5–5.1)
RBC # BLD AUTO: 3.12 MILL/UL (ref 4.2–5.4)
SODIUM SERPL-SCNC: 135 MMOL/L (ref 136–145)
TSH SERPL DL<=0.005 MIU/L-ACNC: 0.23 UIU/ML (ref 0.35–4.94)
VIT B12 SERPL-MCNC: 719 PG/ML (ref 211–911)
WBC # BLD AUTO: 3.4 THOU/UL (ref 4.8–10.8)

## 2019-12-22 RX ADMIN — MOMETASONE FUROATE AND FORMOTEROL FUMARATE DIHYDRATE SCH PUFF: 100; 5 AEROSOL RESPIRATORY (INHALATION) at 18:43

## 2019-12-22 RX ADMIN — ASPIRIN AND EXTENDED-RELEASE DIPYRIDAMOLE SCH CAP: 25; 200 CAPSULE ORAL at 20:58

## 2019-12-22 RX ADMIN — ASPIRIN AND EXTENDED-RELEASE DIPYRIDAMOLE SCH CAP: 25; 200 CAPSULE ORAL at 09:40

## 2019-12-22 RX ADMIN — MOMETASONE FUROATE AND FORMOTEROL FUMARATE DIHYDRATE SCH PUFF: 100; 5 AEROSOL RESPIRATORY (INHALATION) at 06:44

## 2019-12-22 NOTE — PDOC.HOSPP
- Subjective


Encounter Date: 12/22/19


Encounter Time: 11:29


Subjective: 





 The patient states she is still coughing.  The phlegm she is bringing up is 

gray in color, but starting to turn white.  She denies chest pain, no fevers or 

chills.  The patient denies headaches





Patient tested positive for RSV 





- Objective


Vital Signs & Weight: 


 Vital Signs (12 hours)











  Temp Pulse Resp BP Pulse Ox


 


 12/22/19 10:34   90  14  


 


 12/22/19 07:58  98.0 F  81  24 H  180/77 H  95


 


 12/22/19 06:44   100  16  


 


 12/22/19 02:45  97 F L  83  20  179/77 H  99


 


 12/22/19 02:32   81  18   94 L








 Weight











Weight                         236 lb 12.8 oz














I&O: 


 











 12/21/19 12/22/19 12/23/19





 06:59 06:59 06:59


 


Intake Total  720 


 


Output Total  300 


 


Balance  420 











Result Diagrams: 


 12/22/19 05:18





 12/22/19 05:18





Hospitalist ROS





- Review of Systems


Constitutional: denies: fever, chills


ENT: denies: ear pain, ear discharge





- Medication


Medications: 


Active Medications











Generic Name Dose Route Start Last Admin





  Trade Name Freq  PRN Reason Stop Dose Admin


 


Acetaminophen  650 mg  12/21/19 16:05  12/22/19 09:45





  Tylenol  PO   650 mg





  Q4H PRN   Administration





  Headache/Fever/Mild Pain (1-3)   





     





     





     


 


Albuterol/Ipratropium  3 ml  12/21/19 22:30  12/22/19 10:34





  Duoneb  EZPAP   3 ml





  [id number] CORRINE   Administration





     





     





     





     


 


Allopurinol  300 mg  12/22/19 09:00  12/22/19 09:40





  Zyloprim  PO   300 mg





  DAILY CORRINE   Administration





     





     





     





     


 


Atorvastatin Calcium  10 mg  12/21/19 21:00  12/21/19 20:11





  Lipitor  PO   10 mg





  HS CORRINE   Administration





     





     





     





     


 


Azithromycin  250 mg  12/22/19 09:00  12/22/19 09:40





  Zithromax  PO  12/25/19 09:01  250 mg





  DAILY CORRINE   Administration





     





     





     





     


 


Carvedilol  25 mg  12/21/19 21:00  12/22/19 09:41





  Coreg  PO   25 mg





  BID CORRINE   Administration





     





     





     





     


 


Dipyridamole/Aspirin  1 cap  12/21/19 21:00  12/22/19 09:40





  Aggrenox  PO   1 cap





  BID CORRINE   Administration





     





     





     





     


 


Duloxetine HCl  60 mg  12/22/19 09:00  12/22/19 09:41





  Cymbalta  PO   60 mg





  DAILY CORRINE   Administration





     





     





     





     


 


Famotidine  20 mg  12/21/19 21:00  12/21/19 20:28





  Pepcid  PO   20 mg





  QPM CORRINE   Administration





     





     





     





     


 


Gabapentin  300 mg  12/21/19 21:00  12/21/19 20:11





  Neurontin  PO   300 mg





  HS CORRINE   Administration





     





     





     





     


 


Hydralazine HCl  50 mg  12/22/19 09:00  12/22/19 09:41





  Apresoline  PO   50 mg





  TID CORRINE   Administration





     





     





     





     


 


Isosorbide Mononitrate  60 mg  12/21/19 21:00  12/22/19 09:41





  Imdur  PO   60 mg





  BID CORRINE   Administration





     





     





     





     


 


Mometasone Furoate/Formoterol Fumar  2 puff  12/22/19 06:30  12/22/19 06:44





  Dulera 100 Mcg/5 Mcg Inhaler  INH   2 puff





  BID-RT CORRINE   Administration





     





     





     





     


 


Prednisone  40 mg  12/22/19 08:00  12/22/19 09:40





  Prednisone  PO   40 mg





  QAM-WM CORRINE   Administration





     





     





     





     














- Exam


General Appearance: NAD, awake alert


Eye: PERRL, anicteric sclera


ENT: normocephalic atraumatic, no oropharyngeal lesions


Neck: supple, symmetric, no JVD, no thyromegaly


Heart: RRR, no murmur, no gallops, no rubs


Respiratory: CTAB


Respiratory - other findings: bilateral wheezing 


Gastrointestinal: soft, non-tender, non-distended, normal bowel sounds


Extremities: no cyanosis, no clubbing, no edema


Skin: normal turgor, no lesions, no rashes


Neurological: cranial nerve grossly intact, normal sensation to touch, no focal 

deficits, no new deficit


Musculoskeletal: normal tone, normal strength, no muscle wasting





Hosp A/P





- Plan





This is an 85 year old female with history of hypertension, who presented to 

the ER with shortness of breath on exertion, admitted for hypertensive urgency  











#Hypertensive Urgency


#Elevated troponin 


- BP > 249 on admission, s/p nitropaste


- continue coreg and imdur. Increase hydralazine to 50 mg TID


- continue clonidine patch q7 days


- ECHO ordered and pending, troponin peaked at 0.044 








Acute Bronchitis secondary to RSV 


- s/p IV steroids in the ER


-  order albuterol prn, standing duoneb, mucinex.  


- will increase prednisone to 40 mg IV q6 hours due to persistent wheezing


- continue breo


- patient reports not having clear diagnosis of COPD, but follows with Dr. Christianson 

as an outpatient








New onset atrial fibrillation- resolved


- ECHO pending 


- rate controlled, continue coreg 








CAPRI on CKD


- creatinine 1.98, around her baseline 


- UA shows 300 protein, likely hypertensive nephropathy 





Gout


- allopurinol








Hyponatremia


- sodium 133, mild, will monitor





Macrocytic anemia


- Hb 10.4


-TSH low, but free T4 normal


-B12 and folate normal








Dispo:  d/c pending adequate control of BP, improvement in wheezing 


COde status: full code

## 2019-12-22 NOTE — PRG
DATE OF SERVICE:  12/22/2019



SUBJECTIVE:  Ms. Leach is an 85-year-old black female with known history of chronic

renal failure, who was admitted for labile hypertension as well as for presumed

acute bronchitis.  This morning, her breathing is better, but does complain of a

headache.  Adjustment in the BP medications was further done by her hospitalist this

morning.  No complaints of chest pain or shortness of breath. 



OBJECTIVE:  VITAL SIGNS:  Blood pressure 180/77, heart rate 81, respiratory rate 24,

temperature 98, and pulse ox 95%. 

GENERAL:  Awake, alert, comfortable, not in distress. 

SKIN:  Adequate turgor. 

HEENT:  Pinkish conjunctivae.  Anicteric sclerae. 

NECK:  No neck mass.  No carotid bruits.  No JVD. 

CHEST:  No deformities. 

LUNGS:  Clear breath sounds.  No wheezing.  No crackles. 

HEART:  Normal sinus rhythm.  No murmurs.  No gallops.  No rubs. 

ABDOMEN:  Globular, soft, and nontender.  No masses. 

EXTREMITIES:  No edema.  No deformities.



MEDICATIONS:  Medication of December 22, 2019, was reviewed.



LABORATORY DATA:  Laboratories of December 22, 2019; white count 3.4, hemoglobin

10.3.  Sodium 135, potassium 4.5, chloride 103, carbon dioxide 24, BUN 45,

creatinine 1.98, glucose 155, and calcium 8.9.  TSH 0.2278. 



ASSESSMENT AND PLAN:  

1. Chronic renal failure.  This is secondary to hypertensive nephropathy.  Stable

renal function.  No indication for any dialytic intervention.  Continue current

management. 

2. Shortness of breath - secondary to acute bronchitis, much improved.  Wheezing is

gone.  Currently, on antibiotics and steroids. 

3. Labile hypertension.  Agree with increased dose of hydralazine.  Further adjust

BP medications as needed. 



Recheck basic metabolic panel and CBC in a.m.







Job ID:  [id number]

## 2019-12-23 LAB
ANION GAP SERPL CALC-SCNC: 13 MMOL/L (ref 10–20)
BASOPHILS # BLD AUTO: 0 THOU/UL (ref 0–0.2)
BASOPHILS NFR BLD AUTO: 0 % (ref 0–1)
BUN SERPL-MCNC: 56 MG/DL (ref 9.8–20.1)
CALCIUM SERPL-MCNC: 9.1 MG/DL (ref 7.8–10.44)
CHLORIDE SERPL-SCNC: 102 MMOL/L (ref 98–107)
CO2 SERPL-SCNC: 24 MMOL/L (ref 23–31)
CREAT CL PREDICTED SERPL C-G-VRATE: 31 ML/MIN (ref 70–130)
EOSINOPHIL # BLD AUTO: 0 THOU/UL (ref 0–0.7)
EOSINOPHIL NFR BLD AUTO: 0.1 % (ref 0–10)
GLUCOSE SERPL-MCNC: 159 MG/DL (ref 83–110)
HGB BLD-MCNC: 10.5 G/DL (ref 12–16)
LYMPHOCYTES # BLD: 1.1 THOU/UL (ref 1.2–3.4)
LYMPHOCYTES NFR BLD AUTO: 16.5 % (ref 21–51)
MCH RBC QN AUTO: 33.7 PG (ref 27–31)
MCV RBC AUTO: 101 FL (ref 78–98)
MONOCYTES # BLD AUTO: 0.1 THOU/UL (ref 0.11–0.59)
MONOCYTES NFR BLD AUTO: 2.1 % (ref 0–10)
NEUTROPHILS # BLD AUTO: 5.4 THOU/UL (ref 1.4–6.5)
NEUTROPHILS NFR BLD AUTO: 81.3 % (ref 42–75)
PLATELET # BLD AUTO: 215 THOU/UL (ref 130–400)
POTASSIUM SERPL-SCNC: 4.3 MMOL/L (ref 3.5–5.1)
RBC # BLD AUTO: 3.12 MILL/UL (ref 4.2–5.4)
SODIUM SERPL-SCNC: 135 MMOL/L (ref 136–145)
WBC # BLD AUTO: 6.7 THOU/UL (ref 4.8–10.8)

## 2019-12-23 RX ADMIN — MOMETASONE FUROATE AND FORMOTEROL FUMARATE DIHYDRATE SCH PUFF: 100; 5 AEROSOL RESPIRATORY (INHALATION) at 19:38

## 2019-12-23 RX ADMIN — ASPIRIN AND EXTENDED-RELEASE DIPYRIDAMOLE SCH CAP: 25; 200 CAPSULE ORAL at 07:58

## 2019-12-23 RX ADMIN — MOMETASONE FUROATE AND FORMOTEROL FUMARATE DIHYDRATE SCH PUFF: 100; 5 AEROSOL RESPIRATORY (INHALATION) at 08:40

## 2019-12-23 RX ADMIN — ASPIRIN AND EXTENDED-RELEASE DIPYRIDAMOLE SCH CAP: 25; 200 CAPSULE ORAL at 22:07

## 2019-12-23 NOTE — CT
NONCONTRAST CT THORAX:

 

History: Persistent cough and congestion for a few weeks. 

 

Comparison: 2-23-17

 

FINDINGS: 

There is a single less than 4 mm pulmonary nodule in the inferior aspect of the right lower lobe ingrid
cent to the minor fissure with two closely adjacent less than 4 mm pulmonary nodules seen in the supe
rior segment of the right lower lobe which also appeared to have been present on the study in 2017 an
d is too small to characterize but do remain stable compared to the prior exam. No definite additiona
l discrete pulmonary nodule or mass is seen. There is no pleural effusion or consolidation seen in th
e lungs bilaterally. Mild dependent atelectasis is noted. 

 

Vascular calcifications are again seen in the thoracic aorta and coronary arteries. 

 

Lack of intravenous contrast limits sensitivity for evaluation of the parenchymal organs, but no enla
rged lymph nodes are appreciated. Calcified mediastinal and right hilar lymph nodes are again seen. 

 

Post cholecystectomy changes are again noted. 

 

There is a stable difficult to characterize hypodense lesion again noted in the superior pole right k
idney measuring 1.5 cm and is does appear larger in size given slice selection when compared to prior
 study. 

 

Multilevel degenerative changes are seen in the spine and post-surgical changes are again seen involv
ing the right shoulder. 

 

IMPRESSION: 

1. Exophytic hypodense lesion superior pole right kidney, statistically likely representing a cyst, b
ut this is larger in size compared to study in 2017. A non-emergent renal sonogram is recommended for
 further evaluation. 

2. Less than 4 mm pulmonary nodules in the right upper lobe as well as superior segment right lower l
obe which are stable compared to the study in 2017 and likely represent benign nodules given stabilit
y over this period of time. 

3. No consolidation or pleural effusion is seen. 

4. Vascular calcifications in the thoracic aorta and involving the coronary arteries. 

 

POS: MARG[facility]ITA

## 2019-12-23 NOTE — PRG
DATE OF SERVICE:  12/23/2019



SUBJECTIVE:  Ms. Leach is an 85-year-old black female, seen for labile hypertension

as well as chronic renal failure.  She has fluctuating creatinine at the present

time.  No new complaints.  No chest pain or shortness of breath. 



OBJECTIVE:  VITAL SIGNS:  Blood pressure 174/72, heart rate 96, respiratory rate 22,

temperature 97.7, pulse ox 94%. 

GENERAL:  Awake, alert, supine, comfortable, not in distress. 

SKIN:  Adequate turgor. 

HEENT:  Pinkish conjunctivae.  Anicteric sclerae.  No neck mass.  No carotid bruits.

 No JVD. 

LUNGS:  Clear breath sounds.  No wheezing.  No crackles. 

HEART:  Normal sinus rhythm.  No murmur.  No gallops.  No rubs. 

ABDOMEN:  Globular, soft, nontender.  No masses. 

EXTREMITIES:  No edema.  No deformities.



MEDICATIONS:  Of December 23, 2019, were reviewed.



LABORATORY DATA:  Of December 23, 2019:  White count 6.7, hemoglobin 10.5, sodium

135, potassium 4.3, chloride 102, carbon dioxide 24, BUN 56, creatinine 2.27,

glucose 159, calcium 9.1. 



ASSESSMENT AND PLAN:  

1. Acute kidney injury/chronic renal failure, slightly higher creatinine.  Normal

saline 100 mL/h x1 L will be given.  Continue current BP medications.  Continue

low-salt, low-protein diet.  No indication for any dialytic intervention. 

2. Labile hypertension.  Agree with current management.  Adjust BP medications as

needed.  We will recheck basic metabolics in a.m. 







Job ID:  [id number]

## 2019-12-23 NOTE — PDOC.HOSPP
- Subjective


Encounter Date: 12/23/19


Encounter Time: 16:48


Subjective: 





 The patient continues to have persistent cough. It is slightly productive with 

some grayish sputum.  She denies chest pain. 





She denies headaches. 





- Objective


Vital Signs & Weight: 


 Vital Signs (12 hours)











  Temp Pulse Resp BP Pulse Ox


 


 12/23/19 15:57  98.3 F  83  20  148/65 H  97


 


 12/23/19 14:34   77   


 


 12/23/19 14:28   77  20   100


 


 12/23/19 11:26  98.3 F  77  20  151/72 H  100


 


 12/23/19 11:22   88  18   100


 


 12/23/19 08:53   85  16   95


 


 12/23/19 07:58   96   


 


 12/23/19 07:52  97.7 F  96  22 H  174/72 H  94 L








 Weight











Weight                         236 lb 12.8 oz














I&O: 


 











 12/22/19 12/23/19 12/24/19





 06:59 06:59 06:59


 


Intake Total 720 722 


 


Output Total 300 300 


 


Balance 420 422 











Result Diagrams: 


 12/23/19 05:00





 12/23/19 05:00





Hospitalist ROS





- Review of Systems


Constitutional: denies: fever, chills


Gastrointestinal: denies: nausea, vomiting, abdominal pain, diarrhea





- Medication


Medications: 


Active Medications











Generic Name Dose Route Start Last Admin





  Trade Name Freq  PRN Reason Stop Dose Admin


 


Acetaminophen  650 mg  12/21/19 16:05  12/22/19 09:45





  Tylenol  PO   650 mg





  Q4H PRN   Administration





  Headache/Fever/Mild Pain (1-3)   





     





     





     


 


Albuterol/Ipratropium  3 ml  12/21/19 22:30  12/23/19 14:28





  Duoneb  EZPAP   3 ml





  [id number] CORRINE   Administration





     





     





     





     


 


Allopurinol  300 mg  12/22/19 09:00  12/23/19 07:59





  Zyloprim  PO   300 mg





  DAILY CORRINE   Administration





     





     





     





     


 


Atorvastatin Calcium  10 mg  12/21/19 21:00  12/22/19 20:58





  Lipitor  PO   10 mg





  HS CORRINE   Administration





     





     





     





     


 


Azithromycin  250 mg  12/22/19 09:00  12/23/19 07:59





  Zithromax  PO  12/25/19 09:01  250 mg





  DAILY CORRINE   Administration





     





     





     





     


 


Benzonatate  200 mg  12/23/19 12:34  12/23/19 14:34





  Tessalon  PO   200 mg





  TIDPRN PRN   Administration





  Cough   





     





     





     


 


Carvedilol  25 mg  12/21/19 21:00  12/23/19 07:59





  Coreg  PO   25 mg





  BID CORRINE   Administration





     





     





     





     


 


Dipyridamole/Aspirin  1 cap  12/21/19 21:00  12/23/19 07:58





  Aggrenox  PO   1 cap





  BID CORRINE   Administration





     





     





     





     


 


Duloxetine HCl  60 mg  12/22/19 09:00  12/23/19 07:59





  Cymbalta  PO   60 mg





  DAILY CORRINE   Administration





     





     





     





     


 


Famotidine  20 mg  12/21/19 21:00  12/22/19 20:57





  Pepcid  PO   20 mg





  QPM CORRINE   Administration





     





     





     





     


 


Gabapentin  300 mg  12/21/19 21:00  12/22/19 20:57





  Neurontin  PO   300 mg





  HS CORRINE   Administration





     





     





     





     


 


Hydralazine HCl  50 mg  12/22/19 09:00  12/23/19 14:34





  Apresoline  PO   50 mg





  TID CORRINE   Administration





     





     





     





     


 


Sodium Chloride  1,000 mls @ 100 mls/hr  12/23/19 10:15  12/23/19 11:50





  Normal Saline 0.9%  IV  12/23/19 20:14  1,000 mls





  .Q10H CORRINE   Administration





     





     





     





     


 


Isosorbide Mononitrate  60 mg  12/21/19 21:00  12/23/19 07:59





  Imdur  PO   60 mg





  BID CORRINE   Administration





     





     





     





     


 


Mometasone Furoate/Formoterol Fumar  2 puff  12/22/19 06:30  12/23/19 08:40





  Dulera 100 Mcg/5 Mcg Inhaler  INH   2 puff





  BID-RT CORRINE   Administration





     





     





     





     














- Exam


General Appearance: NAD, awake alert


Eye: PERRL, anicteric sclera


ENT: normocephalic atraumatic, no oropharyngeal lesions


Neck: supple, symmetric, no JVD, no thyromegaly


Heart: RRR, no murmur, no gallops, no rubs


Respiratory - other findings: some wheezing and congestion with rales 

bilaterally 


Gastrointestinal: soft, non-tender, non-distended, no palpable masses


Extremities: no cyanosis, no clubbing, no edema


Skin: normal turgor, no lesions, no rashes


Neurological: cranial nerve grossly intact, normal sensation to touch, no 

weakness, no focal deficits, no new deficit


Musculoskeletal: normal tone, normal strength, no muscle wasting





Hosp A/P





- Plan


ECHO: EF 60-65% with diastolic dysfunction, moderate MR, mild TR





This is an 85 year old female with history of hypertension, who presented to 

the ER with shortness of breath on exertion, admitted for hypertensive urgency  











#Hypertensive Urgency


#Elevated troponin 


- BP > 249 on admission, s/p nitropaste


- -150, continue coreg, imdur, hydralazine 50 mg tid, clonidine patch q7 

days


- ECHO showed EF 60-65% with moderate MR, mild TR 








#Acute Bronchitis secondary to RSV 


#Cough


- s/p IV steroids in the ER


- continue albuterol, standing duoneb. Add mucinex and tessalon pearls


- increase prednisone to 60 mg IV q6 hours due to persistent wheezing


- check CT chest to rule out pulm edema/pneumonia


- continue azithromycin and breo


- needs PFTS done as an outpatient 











CAPRI on CKD


- creatinine increased slightly today to 2.27. Ordered gentle IV fluid 

hydration per nephrology


- recheck creatinine tomorrow


- UA shows 300 protein, likely hypertensive nephropathy 








New onset atrial fibrillation- resolved


- ECHO shows diastolic dysfunction


- repeat EKG showed normal sinus rhythm with first degree AV block


- continue coreg


 





Gout


- allopurinol








Hyponatremia


-improved to 135





Macrocytic anemia


- Hb 10.4


-TSH low, but free T4 normal


-B12 and folate normal








Dispo: pending improvement in kidney function, BP, cough .  Needs home with 

home health on d/c 


COde status: full code

## 2019-12-24 LAB
ANION GAP SERPL CALC-SCNC: 11 MMOL/L (ref 10–20)
BUN SERPL-MCNC: 67 MG/DL (ref 9.8–20.1)
CALCIUM SERPL-MCNC: 8.8 MG/DL (ref 7.8–10.44)
CHLORIDE SERPL-SCNC: 101 MMOL/L (ref 98–107)
CO2 SERPL-SCNC: 27 MMOL/L (ref 23–31)
CREAT CL PREDICTED SERPL C-G-VRATE: 30 ML/MIN (ref 70–130)
GLUCOSE SERPL-MCNC: 187 MG/DL (ref 83–110)
POTASSIUM SERPL-SCNC: 4.1 MMOL/L (ref 3.5–5.1)
SODIUM SERPL-SCNC: 135 MMOL/L (ref 136–145)

## 2019-12-24 RX ADMIN — MOMETASONE FUROATE AND FORMOTEROL FUMARATE DIHYDRATE SCH PUFF: 100; 5 AEROSOL RESPIRATORY (INHALATION) at 18:56

## 2019-12-24 RX ADMIN — MOMETASONE FUROATE AND FORMOTEROL FUMARATE DIHYDRATE SCH PUFF: 100; 5 AEROSOL RESPIRATORY (INHALATION) at 06:20

## 2019-12-24 RX ADMIN — ASPIRIN AND EXTENDED-RELEASE DIPYRIDAMOLE SCH CAP: 25; 200 CAPSULE ORAL at 22:33

## 2019-12-24 RX ADMIN — ASPIRIN AND EXTENDED-RELEASE DIPYRIDAMOLE SCH CAP: 25; 200 CAPSULE ORAL at 08:50

## 2019-12-24 NOTE — PRG
DATE OF SERVICE:  12/24/2019



SUBJECTIVE:  Ms. Leach is an 85-year-old black female followed up for labile

hypertension and chronic renal failure.  Creatinine today was noted slightly 
high at

2.32 and yesterday this was noted at 2.35 and yesterday this was at 2.27.  
There was

a slight drop of the GFR from 25 to 24 mL/minute in the last 24 to 48 hours.  
This

may be reflection of the attempt to get the blood pressure lower.  She voices 
no new

complaints.  She denies any chest pain or shortness of breath.  Please note 
that the

previous renal imaging with this patient done several years ago or about 2 
years ago

showed that she had a small right kidney.  She may have underlying chronic renal

failure from renovascular disease and leading to hard to control blood 
pressure.  In the

interim, the patient underwent a CT of the chest - on December 23, 2019, and it

showed exophytic hypodense lesion superior to the right kidney most likely

representing a cyst.  There was less than 4 mm pulmonary nodule in the right 
upper

lobe, which was said to be stable and most likely are benign nodules. 



No other complaints today.  No chest pain or shortness of breath.



OBJECTIVE:  VITAL SIGNS:  Blood pressure 205/85, heart rate 84, respiratory 
rate 18,

temperature 97.9, pulse ox 97% on room air. 

GENERAL:  Noted to be awake, alert, comfortable, not in overt distress. 

SKIN:  Adequate turgor. 

HEENT:  She has pinkish conjunctivae.  Anicteric sclerae. 

NECK:  No neck mass.  No carotid bruits.  No JVD. 

CHEST:  No deformities. 

LUNGS:  Clear breath sounds.  No wheezing.  No crackles. 

HEART:  Normal sinus rhythm.  No murmur.  No gallops.  No rubs. 

ABDOMEN:  Globular, soft, nontender.  No masses. 

EXTREMITIES:  No edema.  No deformities.



MEDICATIONS:  Medications of December 24, 2019, were reviewed.



LABORATORY DATA:  Laboratories of December 23, 2019; white count 6.7, hemoglobin

10.5.  On December 24, 2019; sodium 135, potassium 4.1, chloride 101, carbon 
dioxide

27, BUN 67, creatinine 2.35, glucose 187, calcium 8.8. 



ASSESSMENT AND PLAN:  

1. 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

slightly higher at 2.35 from 2.27.  This is near baseline.  There is no 
indication

for any dialytic intervention.  Continue to hold off any diuretics or ACE

inhibitors. 

2. Labile hypertension.  Agreed with adjustment of hydralazine.  My plan is to

increase hydralazine to 100 mg p.o. t.i.d.  If needed, we can initiate 
minoxidil 5

mg tablet once a day with this patient.  For the moment, agree with current

management.  Recheck basic metabolic, CBC in a.m. 







Job ID:  [id number]



[facility]

## 2019-12-24 NOTE — PDOC.HOSPP
- Subjective


Encounter Date: 12/24/19


Encounter Time: 17:59


Subjective: 





 The patient still is having difficulty with shortness of breath and productive 

cough with gray phlegm. Patient's daughter said she had one time been told she 

had COPD and another time told that she doesn't.  She had PFTS done recently 

and was told that it was more asthma and not COPD, follows with Dr. Christianson.    

The patient says she follows with Dr. Stack and had a normal stress test 

done recently 








Patient's blood pressure elevated, hydralazine increased to 75 mg this morning, 

but still elevated, so increased to 100 mg tid by nephrology 








- Objective


Vital Signs & Weight: 


 Vital Signs (12 hours)











  Temp Pulse Pulse Resp BP BP Pulse Ox


 


 12/24/19 16:54   76     


 


 12/24/19 16:28  97.4 F L  76   20   192/73 H  95


 


 12/24/19 14:40    73   177/74 H  


 


 12/24/19 12:30  97.6 F  80   20   169/72 H  96


 


 12/24/19 10:04   82   16    95


 


 12/24/19 09:59       134/64 


 


 12/24/19 09:58   84     


 


 12/24/19 09:52   84     


 


 12/24/19 08:12  97.9 F  84   18   205/85 H  97


 


 12/24/19 06:58       173/74 H 


 


 12/24/19 06:17   78   16    94 L








 Weight











Weight                         236 lb 12.8 oz














I&O: 


 











 12/23/19 12/24/19 12/25/19





 06:59 06:59 06:59


 


Intake Total 722  


 


Output Total 300  


 


Balance 422  











Result Diagrams: 


 12/23/19 05:00





 12/24/19 04:31





Hospitalist ROS





- Medication


Medications: 


Active Medications











Generic Name Dose Route Start Last Admin





  Trade Name Freq  PRN Reason Stop Dose Admin


 


Acetaminophen  650 mg  12/21/19 16:05  12/24/19 08:58





  Tylenol  PO   650 mg





  Q4H PRN   Administration





  Headache/Fever/Mild Pain (1-3)   





     





     





     


 


Albuterol/Ipratropium  3 ml  12/21/19 22:30  12/24/19 14:08





  Duoneb  EZPAP   3 ml





  [id number] CORRINE   Administration





     





     





     





     


 


Allopurinol  300 mg  12/22/19 09:00  12/24/19 08:50





  Zyloprim  PO   300 mg





  DAILY CORRINE   Administration





     





     





     





     


 


Atorvastatin Calcium  10 mg  12/21/19 21:00  12/23/19 22:07





  Lipitor  PO   10 mg





  HS CORRINE   Administration





     





     





     





     


 


Azithromycin  250 mg  12/22/19 09:00  12/24/19 08:50





  Zithromax  PO  12/25/19 09:01  250 mg





  DAILY CORRINE   Administration





     





     





     





     


 


Benzonatate  200 mg  12/23/19 12:34  12/24/19 08:58





  Tessalon  PO   200 mg





  TIDPRN PRN   Administration





  Cough   





     





     





     


 


Carvedilol  25 mg  12/21/19 21:00  12/24/19 08:58





  Coreg  PO   25 mg





  BID CORRINE   Administration





     





     





     





     


 


Clonidine  0.2 mg  12/24/19 09:00  12/24/19 08:51





  Catapres-Tts-2  TD   0.2 mg





  Q7D CORRINE   Administration





     





     





     





     


 


Dipyridamole/Aspirin  1 cap  12/21/19 21:00  12/24/19 08:50





  Aggrenox  PO   1 cap





  BID CORRINE   Administration





     





     





     





     


 


Duloxetine HCl  60 mg  12/22/19 09:00  12/24/19 08:50





  Cymbalta  PO   60 mg





  DAILY CORRINE   Administration





     





     





     





     


 


Famotidine  20 mg  12/21/19 21:00  12/23/19 22:07





  Pepcid  PO   20 mg





  QPM CORRINE   Administration





     





     





     





     


 


Gabapentin  300 mg  12/21/19 21:00  12/23/19 22:08





  Neurontin  PO   300 mg





  HS CORRINE   Administration





     





     





     





     


 


Guaifenesin  600 mg  12/23/19 21:00  12/24/19 08:50





  Mucinex  PO   600 mg





  BID CORRINE   Administration





     





     





     





     


 


Hydralazine HCl  100 mg  12/24/19 15:00  12/24/19 16:54





  Apresoline  PO   100 mg





  TID CORRINE   Administration





     





     





     





     


 


Isosorbide Mononitrate  60 mg  12/21/19 21:00  12/24/19 08:50





  Imdur  PO   60 mg





  BID CORRINE   Administration





     





     





     





     


 


Methylprednisolone Sodium Succinate  60 mg  12/23/19 18:00  12/24/19 13:13





  Solu-Medrol  IVP   60 mg





  Q6HR CORRINE   Administration





     





     





     





     


 


Mometasone Furoate/Formoterol Fumar  2 puff  12/22/19 06:30  12/24/19 06:20





  Dulera 100 Mcg/5 Mcg Inhaler  INH   2 puff





  BID-RT CORRINE   Administration





     





     





     





     














- Exam


General Appearance: NAD, awake alert


Eye: PERRL, anicteric sclera


ENT: normocephalic atraumatic, no oropharyngeal lesions


Neck: supple, symmetric, no JVD, no thyromegaly


Heart: RRR, no murmur, no gallops, no rubs, normal peripheral pulses


Respiratory: no rales, no ronchi


Respiratory - other findings: persistent wheezing  


Gastrointestinal: soft, non-tender, non-distended, normal bowel sounds


Extremities: no cyanosis, no clubbing, no edema


Skin: normal turgor, no lesions, no rashes


Neurological: cranial nerve grossly intact, normal sensation to touch, no focal 

deficits, no new deficit





Hosp A/P





- Plan


ECHO: EF 60-65% with diastolic dysfunction, moderate MR, mild TR


Chest CT:   4 mm nodule in right upper lobe estable, vascular calcifications in 

thoracic aorta and coronary arteries.  Renal cyst right kidney. NO edema or 

consolidation 





This is an 85 year old female with history of hypertension, who presented to 

the ER with shortness of breath on exertion, admitted for hypertensive urgency  











#Hypertensive Urgency


#Elevated troponin 


- BP > 249 on admission, s/p nitropaste


- -150, continue coreg, imdur. Hydralazine increased to 100 mg tid, 

clonidine patch


- ECHO showed EF 60-65% with moderate MR, mild TR 








#Acute Bronchitis secondary to RSV 


#Asthma exacerbation 


- continue albuterol, standing duoneb,  mucinex and tessalon pearls


- prednisone at 60 mg IV q6 hours, will get pulm consult. CT chest showed no 

pulmonary edema or pneumonia


- continue azithromycin and breo








CAPRI on CKD


- creatinine  worsened to 2.35, stopped IV fluids 


- recheck creatinine tomorrow. Increasing BP meds 


- UA shows 300 protein, likely hypertensive nephropathy 








New onset atrial fibrillation- resolved


- ECHO shows diastolic dysfunction


- repeat EKG showed normal sinus rhythm with first degree AV block


- continue coreg


 





Elevated troponin


- peaked at 0.044. ECHO showed diastolic dysfunction, moderate MR, mild TR 


- no chest pain 





Gout


- allopurinol








Hyponatremia


- stable at 135





Macrocytic anemia


- Hb 10.4


-TSH low, but free T4 normal


-B12 and folate normal








Dispo: pending improvement in kidney function, BP, cough .  Needs home with 

home health on d/c 


COde status: full code

## 2019-12-24 NOTE — ULT
Bilateral renal ultrasound



CLINICAL INDICATION:

Right kidney on nonenhanced CT exam.



COMPARISON:

Noncontrast CT abdomen on 12/23/2019.



FINDINGS:



Right kidney: There is a small hypoechoic exophytic cystic lesion seen at the lateral aspect superior
 pole right kidney with slight echogenic material in this cystic structure likely related to

artifact. This most likely represents a small cyst. However, follow-up evaluation in 4-6 months is re
commended. There is no right hydronephrosis or renal calculus.The right kidney measures 8.8 cm x

4.3 cm.



Left kidney: There is no evidence of a renal mass, renal calculus, or hydronephrosis. The left kidney
 measures 10.1 cm x 5.2 cm.



Urinary bladder: Incompletely decompressed and not evaluated on this exam.



IMPRESSION:



1. Hypoechoic cystic lesion superior pole right kidney most likely related to a renal cyst. However, 
given slight internal echogenicity which is felt to most likely be artifactual and lack of

definitive posterior enhancement, short follow-up ultrasound examination versus CT scan abdomen with 
and without IV contrast is recommended in 4-6 months.

2. No evidence of hydronephrosis.



Reported By: Sebastián Vera 

Electronically Signed:  12/24/2019 7:06 PM

## 2019-12-25 LAB
ANION GAP SERPL CALC-SCNC: 13 MMOL/L (ref 10–20)
BASOPHILS # BLD AUTO: 0 THOU/UL (ref 0–0.2)
BASOPHILS NFR BLD AUTO: 0.3 % (ref 0–1)
BUN SERPL-MCNC: 74 MG/DL (ref 9.8–20.1)
CALCIUM SERPL-MCNC: 8.7 MG/DL (ref 7.8–10.44)
CHLORIDE SERPL-SCNC: 102 MMOL/L (ref 98–107)
CO2 SERPL-SCNC: 22 MMOL/L (ref 23–31)
CREAT CL PREDICTED SERPL C-G-VRATE: 32 ML/MIN (ref 70–130)
EOSINOPHIL # BLD AUTO: 0 THOU/UL (ref 0–0.7)
EOSINOPHIL NFR BLD AUTO: 0 % (ref 0–10)
GLUCOSE SERPL-MCNC: 162 MG/DL (ref 83–110)
HGB BLD-MCNC: 11.2 G/DL (ref 12–16)
LYMPHOCYTES # BLD: 1 THOU/UL (ref 1.2–3.4)
LYMPHOCYTES NFR BLD AUTO: 10.2 % (ref 21–51)
MCH RBC QN AUTO: 33.5 PG (ref 27–31)
MCV RBC AUTO: 101 FL (ref 78–98)
MONOCYTES # BLD AUTO: 0.4 THOU/UL (ref 0.11–0.59)
MONOCYTES NFR BLD AUTO: 3.7 % (ref 0–10)
NEUTROPHILS # BLD AUTO: 8 THOU/UL (ref 1.4–6.5)
NEUTROPHILS NFR BLD AUTO: 85.7 % (ref 42–75)
PLATELET # BLD AUTO: 238 THOU/UL (ref 130–400)
POTASSIUM SERPL-SCNC: 4.3 MMOL/L (ref 3.5–5.1)
RBC # BLD AUTO: 3.34 MILL/UL (ref 4.2–5.4)
SODIUM SERPL-SCNC: 133 MMOL/L (ref 136–145)
WBC # BLD AUTO: 9.4 THOU/UL (ref 4.8–10.8)

## 2019-12-25 RX ADMIN — MOMETASONE FUROATE AND FORMOTEROL FUMARATE DIHYDRATE SCH PUFF: 100; 5 AEROSOL RESPIRATORY (INHALATION) at 07:31

## 2019-12-25 RX ADMIN — MOMETASONE FUROATE AND FORMOTEROL FUMARATE DIHYDRATE SCH PUFF: 100; 5 AEROSOL RESPIRATORY (INHALATION) at 19:19

## 2019-12-25 RX ADMIN — ASPIRIN AND EXTENDED-RELEASE DIPYRIDAMOLE SCH CAP: 25; 200 CAPSULE ORAL at 20:22

## 2019-12-25 RX ADMIN — ASPIRIN AND EXTENDED-RELEASE DIPYRIDAMOLE SCH CAP: 25; 200 CAPSULE ORAL at 08:30

## 2019-12-25 NOTE — PDOC.HOSPP
- Subjective


Encounter Date: 12/25/19


Encounter Time: 16:20


Subjective: 





 The patient states her breathing has improved significantly. She says her 

cough is better. 





BP still elevated, stated amlodipine made her legs swell, doesn't like lasix  . 

Eyes swollen slightly, patient states it is from her allergies for which she 

takes allegra








- Objective


Vital Signs & Weight: 


 Vital Signs (12 hours)











  Temp Pulse Resp BP BP Pulse Ox


 


 12/25/19 15:17  97.3 F L  74  20   141/65 H  95


 


 12/25/19 14:06   79   174/72 H  


 


 12/25/19 13:55   80  16   


 


 12/25/19 11:57  97.7 F  77  19   173/73 H  92 L


 


 12/25/19 08:21  97.9 F  83  23 H   186/78 H  94 L


 


 12/25/19 07:31   90  18   


 


 12/25/19 05:02  97.9 F  84  14   145/65 H  92 L








 Weight











Weight                         236 lb 12.8 oz














Result Diagrams: 


 12/25/19 06:19





 12/25/19 06:19





Hospitalist ROS





- Review of Systems


Cardiovascular: denies: chest pain





- Medication


Medications: 


Active Medications











Generic Name Dose Route Start Last Admin





  Trade Name Freq  PRN Reason Stop Dose Admin


 


Acetaminophen  650 mg  12/21/19 16:05  12/24/19 18:39





  Tylenol  PO   650 mg





  Q4H PRN   Administration





  Headache/Fever/Mild Pain (1-3)   





     





     





     


 


Albuterol/Ipratropium  3 ml  12/21/19 22:30  12/25/19 13:55





  Duoneb  EZPAP   3 ml





  L4TO-RF CORRINE   Administration





     





     





     





     


 


Allopurinol  300 mg  12/22/19 09:00  12/25/19 08:28





  Zyloprim  PO   300 mg





  DAILY CORRINE   Administration





     





     





     





     


 


Atorvastatin Calcium  10 mg  12/21/19 21:00  12/24/19 22:33





  Lipitor  PO   10 mg





  HS CORRINE   Administration





     





     





     





     


 


Benzonatate  200 mg  12/23/19 12:34  12/24/19 18:39





  Tessalon  PO   200 mg





  TIDPRN PRN   Administration





  Cough   





     





     





     


 


Carvedilol  25 mg  12/21/19 21:00  12/25/19 08:29





  Coreg  PO   25 mg





  BID CORRINE   Administration





     





     





     





     


 


Clonidine  0.2 mg  12/24/19 09:00  12/24/19 08:51





  Catapres-Tts-2  TD   0.2 mg





  Q7D CORRINE   Administration





     





     





     





     


 


Dipyridamole/Aspirin  1 cap  12/21/19 21:00  12/25/19 08:30





  Aggrenox  PO   1 cap





  BID CORRINE   Administration





     





     





     





     


 


Duloxetine HCl  60 mg  12/22/19 09:00  12/25/19 08:29





  Cymbalta  PO   60 mg





  DAILY CORRINE   Administration





     





     





     





     


 


Famotidine  20 mg  12/21/19 21:00  12/24/19 22:32





  Pepcid  PO   20 mg





  QPM CORRINE   Administration





     





     





     





     


 


Gabapentin  300 mg  12/21/19 21:00  12/24/19 22:33





  Neurontin  PO   300 mg





  HS CORRINE   Administration





     





     





     





     


 


Guaifenesin  600 mg  12/23/19 21:00  12/25/19 08:29





  Mucinex  PO   600 mg





  BID CORRINE   Administration





     





     





     





     


 


Hydralazine HCl  100 mg  12/24/19 15:00  12/25/19 14:06





  Apresoline  PO   100 mg





  TID CORRINE   Administration





     





     





     





     


 


Methylprednisolone Sodium Succinate  60 mg  12/23/19 18:00  12/25/19 11:59





  Solu-Medrol  IVP   60 mg





  Q6HR CORRINE   Administration





     





     





     





     


 


Mometasone Furoate/Formoterol Fumar  2 puff  12/22/19 06:30  12/25/19 07:31





  Dulera 100 Mcg/5 Mcg Inhaler  INH   2 puff





  BID-RT CORRINE   Administration





     





     





     





     














- Exam


General Appearance: NAD, awake alert


Eye: PERRL, anicteric sclera


Eye - other findings: some periorbital edema noted, slightly erythematous 


ENT: normocephalic atraumatic, no oropharyngeal lesions


Neck: supple, symmetric, no JVD, no thyromegaly


Heart: RRR, no murmur, no gallops, no rubs


Respiratory - other findings: mild scattered wheezing posteriorly with some 

diminished breath sounds 


Extremities: no cyanosis, no clubbing, no edema





Hosp A/P





- Plan


ECHO: EF 60-65% with diastolic dysfunction, moderate MR, mild TR


Chest CT:   4 mm nodule in right upper lobe estable, vascular calcifications in 

thoracic aorta and coronary arteries.  Renal cyst right kidney. NO edema or 

consolidation 





This is an 85 year old female with history of hypertension, who presented to 

the ER with shortness of breath on exertion, admitted for hypertensive urgency  











#Hypertensive Urgency


#Elevated troponin 


- BP > 249 on admission, s/p nitropaste


- -150, continue coreg, hydralazine 100 mg tid. Will increase imdur to 90 

mg bid given BP still 180 


- ECHO showed EF 60-65% with moderate MR, mild TR 








#Acute Bronchitis secondary to RSV 


#Asthma exacerbation 


- continue albuterol, standing duoneb,  mucinex and tessalon pearls


- reduce prednisone to 40 mg IV q6 hours. Pulmonary consulted, no new 

recommendations


- CT chest shows no pulmonary edema or pneumonia


- continue azithromycin and breo


- will add allegra 180 mg daily for allergies 








CAPRI on CKD


- improved to 2.15. Likely hypertensive nephropathy


- will increase imdur to 90 mg bid 


- UA shows 300 protein, likely hypertensive nephropathy 








New onset atrial fibrillation- resolved


- ECHO shows diastolic dysfunction


- repeat EKG showed normal sinus rhythm with first degree AV block


- continue coreg


 





Elevated troponin


- peaked at 0.044. ECHO showed diastolic dysfunction, moderate MR, mild TR 


- no chest pain 





Gout


- allopurinol








Hyponatremia


- slightly diminished to 133 








Macrocytic anemia


- Hb 10.4


-TSH low, but free T4 normal


-B12 and folate normal








Dispo: pending improvement in BP. Possible d/c sheri


COde status: full code

## 2019-12-25 NOTE — CON
DATE OF CONSULTATION:  12/25/2019



REASON FOR CONSULTATION:  Shortness of breath.



HISTORY OF PRESENT ILLNESS:  This patient was admitted to the hospital on 12/21/2019

to the hospitalist group who is consulting me for an opinion regarding her

obstructive lung disease.  When she was admitted on the 21st, she was complaining of

shortness of breath.  Apparently, had been started on some prednisone last week

because of increasing shortness of breath, noted to be severely hypertensive at the

time of admission.  Also had some issues with her kidneys and Dr. Gonzales has been

consulted for evaluation of that.  She has received breathing treatments and IV

methylprednisolone.  She says that she is breathing much better than she was at the

time of admission. 



PAST MEDICAL HISTORY:  

1. Urinary tract infection.

2. Fibromyalgia.

3. Essential tremor.

4. Obesity.

5. Hypertension.

6. Asthma.

7. Chronic diastolic cardiac dysfunction.

8. ISIS.

9. Chronic kidney disease stage 3.

10. History of stroke.



PAST SURGICAL HISTORY:  

1. Hysterectomy.

2. Cholecystectomy.

3. Bilateral knee replaced.

4. Right shoulder surgery.



SOCIAL HISTORY:  Very occasionally drinks alcohol.  Does not smoke.



FAMILY HISTORY:  Unremarkable.



REVIEW OF SYSTEMS:  12-point review of systems otherwise negative.



PHYSICAL EXAMINATION:

VITAL SIGNS:  Temperature 97.7, pulse 77, respirations 19, O2 saturation 92% on room

air, blood pressure 173/73. 

GENERAL:  This patient is awake, alert, and in no distress. 

HEENT:  Remarkable for class III Mallampati airway. 

NECK:  No adenopathy or JVD. 

LUNGS:  Clear without wheezing or rhonchi. 

CARDIOVASCULAR:  S1-S2 regular without audible murmur. 

ABDOMEN:  Soft, nontender to palpation. 

EXTREMITIES:  No clubbing or cyanosis.  She has some bruising over left ankle

region. 



CT of the chest showed some small nonspecific pulmonary nodules.  No evidence of

pleural effusion or consolidation. 



ASSESSMENT:  Chronic obstructive pulmonary disease/asthma, which appears to be

getting better on the current treatment with the nebulization solution and the

steroids. 



PLAN:  At this point, she seems fairly near discharge.  I would transition her over

to oral steroids as soon as practical and wean her steroids over a couple of weeks.

She can follow up with Dr. Christianson in two or three weeks. 







Job ID:  [id number]

## 2019-12-25 NOTE — PRG
DATE OF SERVICE:  12/25/2019



SERVICE:  Renal Medicine.



SUBJECTIVE:  Ms. Leach is an 85-year-old black female, followed up for labile

hypertension as well as chronic renal failure.  In the interim, the patient

underwent a renal ultrasound on December 24, 2019, 



Right kidney was noted to be smaller at 8.8 x 4.3 cm.  The left kidney was noted at

10 x 5.2 cm.  Finding of right renal cyst was noted. 



No new complaints.  She is feeling better.  She is coughing out whitish phlegm.  No

complaints of chest pain or shortness of breath. 



OBJECTIVE:  VITAL SIGNS:  Blood pressure 186/78, heart rate 83, respiratory rate 23,

temperature 97.9, and pulse ox 94%. 

GENERAL:  Noted to be awake, alert, comfortable, not in overt distress. 

SKIN:  Adequate turgor. 

HEENT:  Pinkish conjunctivae.  Anicteric sclerae. 

NECK:  No neck mass.  No carotid bruits.  No JVD. 

CHEST:  No deformities. 

LUNGS:  Clear breath sounds.  No wheezing.  No crackles. 

HEART:  Normal sinus rhythm.  No murmur.  No gallops.  No rubs. 

ABDOMEN:  Globular, soft, and nontender.  No masses. 

EXTREMITIES:  No edema.  No deformities.



MEDICATIONS:  Medications of December 25, 2019, was reviewed.



LABORATORY DATA:  Laboratories of December 25, 2019; white count 9.4, hemoglobin

11.2.  Sodium 133, potassium 4.3, chloride 102, carbon dioxide 22, BUN 74,

creatinine 2.15, glucose 162, and calcium 8.7. 



ASSESSMENT AND PLAN:  

1. Chronic renal failure, stable, slightly improved creatinine at 2.15.  She is

currently at stage 4 chronic renal failure.  There is no indication for any dialytic

intervention.  Please note that the chronic renal failure of this patient is most

likely from hypertensive nephropathy. 

2. Labile hypertension - stable.  Continue current BP medications.  No changes to be

made at the present time.  Recently, hydralazine was increased to 100 mg tablet

t.i.d. 



Recheck basic metabolic panel in a.m.







Job ID:  [id number]

## 2019-12-26 LAB
ANION GAP SERPL CALC-SCNC: 15 MMOL/L (ref 10–20)
BUN SERPL-MCNC: 89 MG/DL (ref 9.8–20.1)
CALCIUM SERPL-MCNC: 8.4 MG/DL (ref 7.8–10.44)
CHLORIDE SERPL-SCNC: 103 MMOL/L (ref 98–107)
CO2 SERPL-SCNC: 20 MMOL/L (ref 23–31)
CREAT CL PREDICTED SERPL C-G-VRATE: 30 ML/MIN (ref 70–130)
GLUCOSE SERPL-MCNC: 158 MG/DL (ref 83–110)
POTASSIUM SERPL-SCNC: 4.7 MMOL/L (ref 3.5–5.1)
SODIUM SERPL-SCNC: 133 MMOL/L (ref 136–145)

## 2019-12-26 RX ADMIN — MOMETASONE FUROATE AND FORMOTEROL FUMARATE DIHYDRATE SCH PUFF: 100; 5 AEROSOL RESPIRATORY (INHALATION) at 19:34

## 2019-12-26 RX ADMIN — CLONIDINE SCH: 0.3 PATCH TRANSDERMAL at 09:01

## 2019-12-26 RX ADMIN — CLONIDINE SCH MG: 0.3 PATCH TRANSDERMAL at 12:16

## 2019-12-26 RX ADMIN — MOMETASONE FUROATE AND FORMOTEROL FUMARATE DIHYDRATE SCH PUFF: 100; 5 AEROSOL RESPIRATORY (INHALATION) at 08:14

## 2019-12-26 RX ADMIN — ASPIRIN AND EXTENDED-RELEASE DIPYRIDAMOLE SCH CAP: 25; 200 CAPSULE ORAL at 20:27

## 2019-12-26 RX ADMIN — ASPIRIN AND EXTENDED-RELEASE DIPYRIDAMOLE SCH CAP: 25; 200 CAPSULE ORAL at 08:59

## 2019-12-26 NOTE — PDOC.HOSPP
- Subjective


Encounter Date: 12/26/19


Encounter Time: 08:51


Subjective: 


86 y/o female with morbid obesity, ISIS on CPAP, HTN, COPD, HLD, CKD and gout 

amongst others admitted with worsening cough and SOB associated with sputum 

production. Also was noted to be in afib on presentyation. Started on steroid, 

antibiotics, brionchodilators with improvement. Feeling and off oxygen.





- Objective


Vital Signs & Weight: 


 Vital Signs (12 hours)











  Temp Pulse Resp BP Pulse Ox Pulse Ox Pulse Ox


 


 12/26/19 16:00   83  19  150/78 H  93 L  


 


 12/26/19 15:36   74     


 


 12/26/19 15:21  97.4 F L  74  13  123/66  96  


 


 12/26/19 14:18   84  18   96  


 


 12/26/19 12:02  98.2 F  78  16  191/84 H  92 L  


 


 12/26/19 10:30   78  20   99  


 


 12/26/19 09:49       89 L  94 L


 


 12/26/19 08:59   87     


 


 12/26/19 08:56  97.6 F  87  20  173/77 H  95  


 


 12/26/19 08:13   85  18   99  














  Pulse Ox


 


 12/26/19 16:00 


 


 12/26/19 15:36 


 


 12/26/19 15:21 


 


 12/26/19 14:18 


 


 12/26/19 12:02 


 


 12/26/19 10:30 


 


 12/26/19 09:49  90 L


 


 12/26/19 08:59 


 


 12/26/19 08:56 


 


 12/26/19 08:13 








 Weight











Weight                         236 lb














I&O: 


 











 12/25/19 12/26/19 12/27/19





 06:59 06:59 06:59


 


Intake Total  400 


 


Balance  400 











Result Diagrams: 


 12/25/19 06:19





 12/26/19 04:34





Hospitalist ROS





- Medication


Medications: 


Active Medications











Generic Name Dose Route Start Last Admin





  Trade Name Freq  PRN Reason Stop Dose Admin


 


Acetaminophen  650 mg  12/21/19 16:05  12/24/19 18:39





  Tylenol  PO   650 mg





  Q4H PRN   Administration





  Headache/Fever/Mild Pain (1-3)   





     





     





     


 


Albuterol/Ipratropium  3 ml  12/21/19 22:30  12/26/19 14:18





  Duoneb  EZPAP   3 ml





  K8IB-ON CORRINE   Administration





     





     





     





     


 


Allopurinol  300 mg  12/22/19 09:00  12/26/19 08:58





  Zyloprim  PO   300 mg





  DAILY CORRINE   Administration





     





     





     





     


 


Atorvastatin Calcium  10 mg  12/21/19 21:00  12/25/19 20:19





  Lipitor  PO   10 mg





  HS CORRINE   Administration





     





     





     





     


 


Benzonatate  200 mg  12/23/19 12:34  12/24/19 18:39





  Tessalon  PO   200 mg





  TIDPRN PRN   Administration





  Cough   





     





     





     


 


Carvedilol  25 mg  12/21/19 21:00  12/26/19 09:00





  Coreg  PO   25 mg





  BID CORRINE   Administration





     





     





     





     


 


Clonidine  0.3 mg  12/26/19 09:00  12/26/19 12:16





  Catapres-Tts-3  TD   0.3 mg





  Q7DAYS CORRINE   Administration





     





     





     





     


 


Dipyridamole/Aspirin  1 cap  12/21/19 21:00  12/26/19 08:59





  Aggrenox  PO   1 cap





  BID CORRINE   Administration





     





     





     





     


 


Duloxetine HCl  60 mg  12/22/19 09:00  12/26/19 08:59





  Cymbalta  PO   60 mg





  DAILY CORRINE   Administration





     





     





     





     


 


Famotidine  20 mg  12/21/19 21:00  12/25/19 20:22





  Pepcid  PO   20 mg





  QPM CORRINE   Administration





     





     





     





     


 


Gabapentin  300 mg  12/21/19 21:00  12/25/19 20:22





  Neurontin  PO   300 mg





  HS CORRINE   Administration





     





     





     





     


 


Guaifenesin  600 mg  12/23/19 21:00  12/26/19 08:59





  Mucinex  PO   600 mg





  BID CORRINE   Administration





     





     





     





     


 


Hydralazine HCl  100 mg  12/24/19 15:00  12/26/19 15:36





  Apresoline  PO   100 mg





  TID CORRINE   Administration





     





     





     





     


 


Mometasone Furoate/Formoterol Fumar  2 puff  12/22/19 06:30  12/26/19 08:14





  Dulera 100 Mcg/5 Mcg Inhaler  INH   2 puff





  BID-RT CORRINE   Administration





     





     





     





     


 


Prednisone  40 mg  12/26/19 08:00  12/26/19 08:58





  Prednisone  PO   40 mg





  QAM-WM CORRINE   Administration





     





     





     





     














- Exam


General Appearance: awake alert


General - other findings: morbidly obese


ENT: normocephalic atraumatic, moist mucosa


Neck: symmetric, no JVD


Heart: RRR, murmur present


Respiratory: no wheezes, no ronchi, normal chest expansion, no tachypnea


Respiratory - other findings: fair air entry with some transmitted sound


Gastrointestinal: soft, non-tender, non-distended, normal bowel sounds


Gastrointestinal - other findings: morbidly obese


Extremities: 1+ LE edema


Extremities - other findings: some scattered wounds with scab at various stages 

of resolution noted


Neurological: cranial nerve grossly intact, no focal deficits


Psychiatric: A&O x 3





Hosp A/P


(1) COPD with exacerbation


Code(s): J44.1 - CHRONIC OBSTRUCTIVE PULMONARY DISEASE W (ACUTE) EXACERBATION   

Status: Acute   





(2) Hypertensive urgency, malignant


Code(s): I16.0 - HYPERTENSIVE URGENCY   Status: Resolved   





(3) Acute kidney injury


Code(s): N17.9 - ACUTE KIDNEY FAILURE, UNSPECIFIED   Status: Resolved   





(4) Paroxysmal atrial fibrillation


Code(s): I48.0 - PAROXYSMAL ATRIAL FIBRILLATION   Status: Acute   





(5) Elevated troponin


Code(s): R79.89 - OTHER SPECIFIED ABNORMAL FINDINGS OF BLOOD CHEMISTRY   Status

: Acute   





(6) Gout


Code(s): M10.9 - GOUT, UNSPECIFIED   Status: Acute   





(7) Morbid obesity


Code(s): E66.01 - MORBID (SEVERE) OBESITY DUE TO EXCESS CALORIES   Status: 

Acute   





(8) ISIS on CPAP


Code(s): G47.33 - OBSTRUCTIVE SLEEP APNEA (ADULT) (PEDIATRIC); Z99.89 - 

DEPENDENCE ON OTHER ENABLING MACHINES AND DEVICES   Status: Acute   





(9) Proteinuria


Code(s): R80.9 - PROTEINURIA, UNSPECIFIED   Status: Acute   





(10) CKD (chronic kidney disease) stage 3, GFR 30-59 ml/min


Code(s): N18.3 - CHRONIC KIDNEY DISEASE, STAGE 3 (MODERATE)   Status: Chronic   





(11) Hyponatremia


Code(s): E87.1 - HYPO-OSMOLALITY AND HYPONATREMIA   Status: Resolved   





(12) Diastolic dysfunction


Code(s): I51.89 - OTHER ILL-DEFINED HEART DISEASES   Status: Acute   





(13) Moderate mitral regurgitation


Code(s): I34.0 - NONRHEUMATIC MITRAL (VALVE) INSUFFICIENCY   Status: Acute   





- Plan


Increase isosobide mononitrate to 120 bid.


Increase clonidine to 0.3 patch


de escalate steroid therapy.


Continue bronchodilators and antibiotics.


Get urine protein creat ratio


CAPRI treatment as per Nephrology.


Consult cardiology RE PAF for chronic anticoagulation question.


Follow CBC and renal function in the am.

## 2019-12-26 NOTE — PRG
DATE OF SERVICE:  12/26/2019



SERVICE:  Renal Medicine.



SUBJECTIVE:  Ms. Leach is an 85-year-old black female, followed up for chronic

renal failure as well as labile hypertension.  Adjustment with blood pressure

medication has been made by the Hospitalist Service.  Recently, clonidine patch has

been increased to TTS-3 q.week.  In addition, Imdur is now 90 mg p.o. b.i.d.  Renal

function is relatively stable.  As previously mentioned, the possibility of

renovascular disease with this patient remains.  The patient voices no new

complaints.  She is feeling better. 



OBJECTIVE:  VITAL SIGNS:  Blood pressure 173/77, heart rate 87, respiratory rate 20,

temperature 97.6, pulse ox 95% on room air. 

GENERAL:  Awake, alert, comfortable, not in distress. 

SKIN:  Adequate turgor. 

HEENT:  She has pinkish conjunctivae.  Anicteric sclerae. 

NECK:  No neck mass.  No carotid bruits.  No JVD. 

CHEST:  No deformities. 

LUNGS:  Clear breath sounds. 

HEART:  Normal sinus rhythm.  No murmur.  No gallops.  No rubs. 

ABDOMEN:  Globular, soft, and nontender.  No masses. 

EXTREMITIES:  No edema.  No deformities.



MEDICATIONS:  Medications of December 26, 2019, were reviewed.



LABORATORY DATA:  Laboratories of December 25, 2019; white count 9.4, hemoglobin

11.2.  December 26, 2019; sodium 133, potassium 4.7, chloride 103, carbon dioxide

20, BUN 89, creatinine 2.36, glucose 158, calcium 8.4. 



ASSESSMENT AND PLAN:  

1. 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

Relatively stable renal function.  Creatinine 2.36 is near baseline.  The creatinine

is noted to be fluctuating.  There is no indication for any dialytic intervention. 

2. Labile hypertension.  Recent adjustment of blood pressure medications.  Please 

note, clonidine patch now is TTS-3, and Imdur is now 90 mg p.o. b.i.d.  She will

continue with the other current antihypertensive medications. 

3. From a renal point of view, this patient can be discharged any time.

4. Recheck basic metabolic panel in a.m.







Job ID:  [id number]

## 2019-12-27 LAB
ANION GAP SERPL CALC-SCNC: 14 MMOL/L (ref 10–20)
BASOPHILS # BLD AUTO: 0 THOU/UL (ref 0–0.2)
BASOPHILS NFR BLD AUTO: 0 % (ref 0–1)
BUN SERPL-MCNC: 97 MG/DL (ref 9.8–20.1)
CALCIUM SERPL-MCNC: 8.2 MG/DL (ref 7.8–10.44)
CHLORIDE SERPL-SCNC: 102 MMOL/L (ref 98–107)
CO2 SERPL-SCNC: 22 MMOL/L (ref 23–31)
CREAT CL PREDICTED SERPL C-G-VRATE: 29 ML/MIN (ref 70–130)
EOSINOPHIL # BLD AUTO: 0 THOU/UL (ref 0–0.7)
EOSINOPHIL NFR BLD AUTO: 0 % (ref 0–10)
GLUCOSE SERPL-MCNC: 152 MG/DL (ref 83–110)
HGB BLD-MCNC: 10.7 G/DL (ref 12–16)
LYMPHOCYTES # BLD: 0.7 THOU/UL (ref 1.2–3.4)
LYMPHOCYTES NFR BLD AUTO: 8.8 % (ref 21–51)
MCH RBC QN AUTO: 34.4 PG (ref 27–31)
MCV RBC AUTO: 100 FL (ref 78–98)
MONOCYTES # BLD AUTO: 1 THOU/UL (ref 0.11–0.59)
MONOCYTES NFR BLD AUTO: 11.5 % (ref 0–10)
NEUTROPHILS # BLD AUTO: 6.8 THOU/UL (ref 1.4–6.5)
NEUTROPHILS NFR BLD AUTO: 79.7 % (ref 42–75)
PLATELET # BLD AUTO: 217 THOU/UL (ref 130–400)
POTASSIUM SERPL-SCNC: 4.3 MMOL/L (ref 3.5–5.1)
PROT UR-MCNC: 124 MG/DL (ref 1–14)
RBC # BLD AUTO: 3.11 MILL/UL (ref 4.2–5.4)
SODIUM SERPL-SCNC: 134 MMOL/L (ref 136–145)
WBC # BLD AUTO: 8.5 THOU/UL (ref 4.8–10.8)

## 2019-12-27 RX ADMIN — ASPIRIN AND EXTENDED-RELEASE DIPYRIDAMOLE SCH CAP: 25; 200 CAPSULE ORAL at 09:19

## 2019-12-27 RX ADMIN — ASPIRIN AND EXTENDED-RELEASE DIPYRIDAMOLE SCH CAP: 25; 200 CAPSULE ORAL at 22:57

## 2019-12-27 RX ADMIN — MOMETASONE FUROATE AND FORMOTEROL FUMARATE DIHYDRATE SCH PUFF: 100; 5 AEROSOL RESPIRATORY (INHALATION) at 19:18

## 2019-12-27 RX ADMIN — MOMETASONE FUROATE AND FORMOTEROL FUMARATE DIHYDRATE SCH PUFF: 100; 5 AEROSOL RESPIRATORY (INHALATION) at 07:40

## 2019-12-27 NOTE — PRG
DATE OF SERVICE:  12/27/2019



SUBJECTIVE:  Ms. Leach is an 85-year-old black female, who was seen for labile

hypertension as well as for acute kidney injury/chronic renal failure.  She has also

an underlying obstructive sleep apnea, ? of COPD, and recently developed paroxysmal

atrial fibrillation.  Cardiology consult has been done.  In addition, BP medications

have been adjusted for the last several days.  No new complaints today.  She does

make mention of shortness of breath on exertion.  A cardiac echo was recently done,

which showed a normal ejection fraction. 



OBJECTIVE:  VITAL SIGNS:  Blood pressure is noted at 142/64, heart rate 85,

respiratory rate 20, temperature 98.6, and pulse ox is ranging from 88% to 95% on 2

L. 

GENERAL:  The patient is awake, comfortable, supine, not in overt distress. 

SKIN:  Adequate turgor. 

HEENT:  She has slightly pale conjunctivae.  Anicteric sclerae. 

NECK:  No neck mass.  No carotid bruits.  No JVD. 

CHEST:  No deformities. 

LUNGS:  Clear breath sounds. 

HEART:  Normal sinus rhythm.  No murmur.  No gallops.  No rubs. 

ABDOMEN:  Globular, soft, and nontender.  No masses. 

EXTREMITIES:  No edema.  No deformities.



MEDICATIONS:  Medications of December 27, 2019, reviewed.



LABORATORY DATA:  Laboratories of December 27, 2019; white count 8.5, hemoglobin

10.7.  Sodium 134, potassium 4.3, chloride 102, carbon dioxide 22, BUN 97,

creatinine 2.36, glucose 152, and calcium 8.2. 



ASSESSMENT AND PLAN:  

1. Anemia - start iron supplementation 325 mg p.o. b.i.d.  We will hold Epogen for

the moment. 

2. Labile hypertension, much improved with adjustment of BP medications.

3. Acute kidney injury/chronic renal failure.  Stable renal function.  No indication

for any dialytic intervention. 

4. Paroxysmal atrial fibrillation.  Cardiology consult.

5. Shortness of breath, multifactorial etiology.  According to the patient, she has

been workup for COPD and was told she does not have it.  However, clinically she

presents like someone with chronic obstructive pulmonary disease. 

6. Overall agree with current management.  Recheck basic metabolic and CBC in a.m.







Job ID:  [id number]

## 2019-12-27 NOTE — PDOC.HOSPP
- Subjective


Encounter Date: 12/27/19


Encounter Time: 11:57


Subjective: 


84 y/o female with morbid obesity, ISIS on CPAP, HTN, COPD, HLD, CKD and gout 

amongst others admitted with worsening cough and SOB associated with sputum 

production. Also was noted to be in afib on presentyation. Started on steroid, 

antibiotics, bronchodilators with improvement. Feeling better. No new problem. 





- Objective


Vital Signs & Weight: 


 Vital Signs (12 hours)











  Temp Pulse Resp BP Pulse Ox


 


 12/27/19 12:35  98 F  74  17  140/63  94 L


 


 12/27/19 10:56   90  15  


 


 12/27/19 09:18   87   


 


 12/27/19 09:16  98.6 F  87  20  160/72 H  96


 


 12/27/19 07:39   80  15  


 


 12/27/19 07:30      96


 


 12/27/19 04:00  98.6 F  85  20  142/64 H  88 L








 Weight











Weight                         252 lb














I&O: 


 











 12/26/19 12/27/19 12/28/19





 06:59 06:59 06:59


 


Intake Total 400  


 


Balance 400  











Result Diagrams: 


 12/27/19 04:53





 12/27/19 04:53





Hospitalist ROS





- Medication


Medications: 


Active Medications











Generic Name Dose Route Start Last Admin





  Trade Name Freq  PRN Reason Stop Dose Admin


 


Acetaminophen  650 mg  12/21/19 16:05  12/24/19 18:39





  Tylenol  PO   650 mg





  Q4H PRN   Administration





  Headache/Fever/Mild Pain (1-3)   





     





     





     


 


Albuterol/Ipratropium  3 ml  12/21/19 22:30  12/27/19 10:56





  Duoneb  EZPAP   3 ml





  [id number] CORRINE   Administration





     





     





     





     


 


Allopurinol  300 mg  12/22/19 09:00  12/27/19 09:18





  Zyloprim  PO   300 mg





  DAILY CORRINE   Administration





     





     





     





     


 


Atorvastatin Calcium  10 mg  12/21/19 21:00  12/26/19 20:29





  Lipitor  PO   10 mg





  HS CORRINE   Administration





     





     





     





     


 


Benzonatate  200 mg  12/23/19 12:34  12/24/19 18:39





  Tessalon  PO   200 mg





  TIDPRN PRN   Administration





  Cough   





     





     





     


 


Carvedilol  25 mg  12/21/19 21:00  12/27/19 09:18





  Coreg  PO   25 mg





  BID CORRINE   Administration





     





     





     





     


 


Clonidine  0.3 mg  12/26/19 09:00  12/26/19 12:16





  Catapres-Tts-3  TD   0.3 mg





  Q7DAYS CORRINE   Administration





     





     





     





     


 


Dipyridamole/Aspirin  1 cap  12/21/19 21:00  12/27/19 09:19





  Aggrenox  PO   1 cap





  BID CORRINE   Administration





     





     





     





     


 


Duloxetine HCl  60 mg  12/22/19 09:00  12/27/19 09:18





  Cymbalta  PO   60 mg





  DAILY CORRINE   Administration





     





     





     





     


 


Famotidine  20 mg  12/21/19 21:00  12/26/19 20:29





  Pepcid  PO   20 mg





  QPM CORRINE   Administration





     





     





     





     


 


Gabapentin  300 mg  12/21/19 21:00  12/26/19 20:29





  Neurontin  PO   300 mg





  HS CORRINE   Administration





     





     





     





     


 


Guaifenesin  600 mg  12/23/19 21:00  12/27/19 09:18





  Mucinex  PO   600 mg





  BID CORRINE   Administration





     





     





     





     


 


Hydralazine HCl  100 mg  12/24/19 15:00  12/27/19 09:18





  Apresoline  PO   100 mg





  TID CORRINE   Administration





     





     





     





     


 


Isosorbide Mononitrate  120 mg  12/26/19 21:00  12/27/19 09:18





  Imdur  PO   120 mg





  BID CORRINE   Administration





     





     





     





     


 


Mometasone Furoate/Formoterol Fumar  2 puff  12/22/19 06:30  12/27/19 07:40





  Dulera 100 Mcg/5 Mcg Inhaler  INH   2 puff





  BID-RT CORRINE   Administration





     





     





     





     


 


Prednisone  40 mg  12/26/19 08:00  12/27/19 09:17





  Prednisone  PO   40 mg





  QAM-WM CORRINE   Administration





     





     





     





     














- Exam


General Appearance: awake alert


General - other findings: morbidly obese


Eye: anicteric sclera


ENT: normocephalic atraumatic, moist mucosa


Neck: symmetric, no JVD


Heart: RRR


Respiratory: no wheezes, no ronchi, no tachypnea


Respiratory - other findings: fair air entry with some transmitted sound 

especially right base


Gastrointestinal: soft, non-tender, non-distended, normal bowel sounds


Extremities: no cyanosis


Extremities - other findings: trace leg edema


Neurological: cranial nerve grossly intact, no focal deficits


Psychiatric: A&O x 3





Hosp A/P


(1) RSV (respiratory syncytial virus infection)


Code(s): B97.4 - RESPIRATORY SYNCYTIAL VIRUS CAUSING DISEASES CLASSD ELSWHR   

Status: Acute   





(2) COPD with exacerbation


Code(s): J44.1 - CHRONIC OBSTRUCTIVE PULMONARY DISEASE W (ACUTE) EXACERBATION   

Status: Acute   





(3) Hypertensive urgency, malignant


Code(s): I16.0 - HYPERTENSIVE URGENCY   Status: Resolved   





(4) Acute kidney injury


Code(s): N17.9 - ACUTE KIDNEY FAILURE, UNSPECIFIED   Status: Resolved   





(5) Paroxysmal atrial fibrillation


Code(s): I48.0 - PAROXYSMAL ATRIAL FIBRILLATION   Status: Acute   





(6) Elevated troponin


Code(s): R79.89 - OTHER SPECIFIED ABNORMAL FINDINGS OF BLOOD CHEMISTRY   Status

: Acute   





(7) Gout


Code(s): M10.9 - GOUT, UNSPECIFIED   Status: Acute   





(8) Morbid obesity


Code(s): E66.01 - MORBID (SEVERE) OBESITY DUE TO EXCESS CALORIES   Status: 

Acute   





(9) ISIS on CPAP


Code(s): G47.33 - OBSTRUCTIVE SLEEP APNEA (ADULT) (PEDIATRIC); Z99.89 - 

DEPENDENCE ON OTHER ENABLING MACHINES AND DEVICES   Status: Acute   





(10) Proteinuria


Code(s): R80.9 - PROTEINURIA, UNSPECIFIED   Status: Acute   





(11) CKD (chronic kidney disease) stage 3, GFR 30-59 ml/min


Code(s): N18.3 - CHRONIC KIDNEY DISEASE, STAGE 3 (MODERATE)   Status: Chronic   





(12) Hyponatremia


Code(s): E87.1 - HYPO-OSMOLALITY AND HYPONATREMIA   Status: Resolved   





(13) Diastolic dysfunction


Code(s): I51.89 - OTHER ILL-DEFINED HEART DISEASES   Status: Acute   





(14) Moderate mitral regurgitation


Code(s): I34.0 - NONRHEUMATIC MITRAL (VALVE) INSUFFICIENCY   Status: Acute   





- Plan


Continue current antihypertensives isosobide mononitrate to 120 bid, clonidine 

to 0.3 patch and others.


Await cardiology input.


Monitor renal function given worsening azotemia


Continue bronchodilators, steroid and antibiotics.


Follow CBC and renal function in the am.

## 2019-12-28 LAB
ANION GAP SERPL CALC-SCNC: 16 MMOL/L (ref 10–20)
BACTERIA UR QL AUTO: (no result) HPF
BASOPHILS # BLD AUTO: 0 THOU/UL (ref 0–0.2)
BASOPHILS NFR BLD AUTO: 0.3 % (ref 0–1)
BUN SERPL-MCNC: 112 MG/DL (ref 9.8–20.1)
CALCIUM SERPL-MCNC: 8.2 MG/DL (ref 7.8–10.44)
CHLORIDE SERPL-SCNC: 102 MMOL/L (ref 98–107)
CO2 SERPL-SCNC: 20 MMOL/L (ref 23–31)
CREAT CL PREDICTED SERPL C-G-VRATE: 28 ML/MIN (ref 70–130)
EOSINOPHIL # BLD AUTO: 0 THOU/UL (ref 0–0.7)
EOSINOPHIL NFR BLD AUTO: 0.1 % (ref 0–10)
GLUCOSE SERPL-MCNC: 205 MG/DL (ref 83–110)
HGB BLD-MCNC: 10.2 G/DL (ref 12–16)
LEUKOCYTE ESTERASE UR QL STRIP.AUTO: 500 LEU/UL
LYMPHOCYTES # BLD: 0.7 THOU/UL (ref 1.2–3.4)
LYMPHOCYTES NFR BLD AUTO: 7.1 % (ref 21–51)
MCH RBC QN AUTO: 35 PG (ref 27–31)
MCV RBC AUTO: 101 FL (ref 78–98)
MONOCYTES # BLD AUTO: 1.2 THOU/UL (ref 0.11–0.59)
MONOCYTES NFR BLD AUTO: 13 % (ref 0–10)
NEUTROPHILS # BLD AUTO: 7.4 THOU/UL (ref 1.4–6.5)
NEUTROPHILS NFR BLD AUTO: 79.5 % (ref 42–75)
PLATELET # BLD AUTO: 184 THOU/UL (ref 130–400)
POTASSIUM SERPL-SCNC: 4.6 MMOL/L (ref 3.5–5.1)
PROT UR STRIP.AUTO-MCNC: 100 MG/DL
RBC # BLD AUTO: 2.91 MILL/UL (ref 4.2–5.4)
RBC UR QL AUTO: (no result) HPF (ref 0–3)
SODIUM SERPL-SCNC: 133 MMOL/L (ref 136–145)
SODIUM UR-SCNC: (no result) MMOL/L
UUN UR-MCNC: 710 MG/DL
WBC # BLD AUTO: 9.2 THOU/UL (ref 4.8–10.8)
YEAST # UR AUTO: (no result) HPF

## 2019-12-28 RX ADMIN — MOMETASONE FUROATE AND FORMOTEROL FUMARATE DIHYDRATE SCH PUFF: 100; 5 AEROSOL RESPIRATORY (INHALATION) at 08:45

## 2019-12-28 RX ADMIN — MOMETASONE FUROATE AND FORMOTEROL FUMARATE DIHYDRATE SCH PUFF: 100; 5 AEROSOL RESPIRATORY (INHALATION) at 19:07

## 2019-12-28 RX ADMIN — ASPIRIN AND EXTENDED-RELEASE DIPYRIDAMOLE SCH CAP: 25; 200 CAPSULE ORAL at 09:40

## 2019-12-28 RX ADMIN — ASPIRIN AND EXTENDED-RELEASE DIPYRIDAMOLE SCH CAP: 25; 200 CAPSULE ORAL at 21:48

## 2019-12-28 NOTE — PDOC.HOSPP
- Subjective


Encounter Date: 12/28/19


Encounter Time: 09:25


Subjective: 


84 y/o female with morbid obesity, ISIS on CPAP, HTN, COPD, HLD, CKD and gout 

amongst others admitted with worsening cough and SOB associated with sputum 

production. Also was noted to be in afib on presentyation. Started on steroid, 

antibiotics, bronchodilators with improvement. Complaining of difficulty 

swallaowing which predated admission. Feeling better otherwise. 





- Objective


Vital Signs & Weight: 


 Vital Signs (12 hours)











  Temp Pulse Resp BP Pulse Ox


 


 12/28/19 11:47   72  16  


 


 12/28/19 09:40   76   


 


 12/28/19 08:35  97.8 F  77  20  173/70 H  94 L


 


 12/28/19 08:30   76  18  


 


 12/28/19 03:00  98 F  78  18  153/66 H  95


 


 12/28/19 02:45      97








 Weight











Weight                         252 lb














I&O: 


 











 12/27/19 12/28/19 12/29/19





 06:59 06:59 06:59


 


Intake Total  940 


 


Output Total  1040 


 


Balance  -100 











Result Diagrams: 


 12/28/19 04:22





 12/28/19 04:22





Hospitalist ROS





- Medication


Medications: 


Active Medications











Generic Name Dose Route Start Last Admin





  Trade Name Freq  PRN Reason Stop Dose Admin


 


Acetaminophen  650 mg  12/21/19 16:05  12/24/19 18:39





  Tylenol  PO   650 mg





  Q4H PRN   Administration





  Headache/Fever/Mild Pain (1-3)   





     





     





     


 


Albuterol/Ipratropium  3 ml  12/21/19 22:30  12/28/19 11:47





  Duoneb  EZPAP   3 ml





  [id number] CORRINE   Administration





     





     





     





     


 


Atorvastatin Calcium  10 mg  12/21/19 21:00  12/27/19 22:44





  Lipitor  PO   10 mg





  HS CORRINE   Administration





     





     





     





     


 


Benzonatate  200 mg  12/23/19 12:34  12/24/19 18:39





  Tessalon  PO   200 mg





  TIDPRN PRN   Administration





  Cough   





     





     





     


 


Carvedilol  25 mg  12/21/19 21:00  12/28/19 09:41





  Coreg  PO   25 mg





  BID CORRINE   Administration





     





     





     





     


 


Clonidine  0.3 mg  12/26/19 09:00  12/26/19 12:16





  Catapres-Tts-3  TD   0.3 mg





  Q7DAYS CORRINE   Administration





     





     





     





     


 


Dipyridamole/Aspirin  1 cap  12/21/19 21:00  12/28/19 09:40





  Aggrenox  PO   1 cap





  BID CORRINE   Administration





     





     





     





     


 


Duloxetine HCl  60 mg  12/22/19 09:00  12/28/19 09:40





  Cymbalta  PO   60 mg





  DAILY CORRINE   Administration





     





     





     





     


 


Famotidine  20 mg  12/21/19 21:00  12/27/19 22:44





  Pepcid  PO   20 mg





  QPM CORRINE   Administration





     





     





     





     


 


Gabapentin  300 mg  12/21/19 21:00  12/27/19 22:45





  Neurontin  PO   300 mg





  HS CORRINE   Administration





     





     





     





     


 


Guaifenesin  600 mg  12/23/19 21:00  12/28/19 09:40





  Mucinex  PO   600 mg





  BID CORRINE   Administration





     





     





     





     


 


Hydralazine HCl  50 mg  12/27/19 21:00  12/28/19 09:40





  Apresoline  PO   50 mg





  TID CORRINE   Administration





     





     





     





     


 


Sodium Chloride  1,000 mls @ 100 mls/hr  12/28/19 09:15  12/28/19 09:42





  Normal Saline 0.9%  IV   1,000 mls





  .Q10H CORRINE   Administration





     





     





     





     


 


Isosorbide Mononitrate  120 mg  12/26/19 21:00  12/28/19 09:40





  Imdur  PO   120 mg





  BID CORRINE   Administration





     





     





     





     


 


Mometasone Furoate/Formoterol Fumar  2 puff  12/22/19 06:30  12/28/19 08:45





  Dulera 100 Mcg/5 Mcg Inhaler  INH   2 puff





  BID-RT CORRINE   Administration





     





     





     





     


 


Prednisone  40 mg  12/26/19 08:00  12/28/19 09:41





  Prednisone  PO   40 mg





  QAM-WM CORRINE   Administration





     





     





     





     














- Exam


General Appearance: awake alert


General - other findings: obese


Eye: anicteric sclera


ENT: normocephalic atraumatic


Neck: symmetric, no JVD


Heart: RRR


Respiratory: no wheezes, no ronchi, normal chest expansion, no tachypnea


Gastrointestinal: soft, non-tender, non-distended, normal bowel sounds


Extremities: no cyanosis, no edema


Neurological: cranial nerve grossly intact, no new deficit


Psychiatric: A&O x 3





Hosp A/P


(1) Acute kidney injury


Code(s): N17.9 - ACUTE KIDNEY FAILURE, UNSPECIFIED   Status: Resolved   





(2) RSV (respiratory syncytial virus infection)


Code(s): B97.4 - RESPIRATORY SYNCYTIAL VIRUS CAUSING DISEASES CLASSD ELSWHR   

Status: Acute   





(3) COPD with exacerbation


Code(s): J44.1 - CHRONIC OBSTRUCTIVE PULMONARY DISEASE W (ACUTE) EXACERBATION   

Status: Acute   





(4) Hypertensive urgency, malignant


Code(s): I16.0 - HYPERTENSIVE URGENCY   Status: Resolved   





(5) Paroxysmal atrial fibrillation


Code(s): I48.0 - PAROXYSMAL ATRIAL FIBRILLATION   Status: Acute   





(6) Elevated troponin


Code(s): R79.89 - OTHER SPECIFIED ABNORMAL FINDINGS OF BLOOD CHEMISTRY   Status

: Acute   





(7) Gout


Code(s): M10.9 - GOUT, UNSPECIFIED   Status: Acute   





(8) Morbid obesity


Code(s): E66.01 - MORBID (SEVERE) OBESITY DUE TO EXCESS CALORIES   Status: 

Acute   





(9) ISIS on CPAP


Code(s): G47.33 - OBSTRUCTIVE SLEEP APNEA (ADULT) (PEDIATRIC); Z99.89 - 

DEPENDENCE ON OTHER ENABLING MACHINES AND DEVICES   Status: Acute   





(10) Proteinuria


Code(s): R80.9 - PROTEINURIA, UNSPECIFIED   Status: Acute   





(11) CKD (chronic kidney disease) stage 3, GFR 30-59 ml/min


Code(s): N18.3 - CHRONIC KIDNEY DISEASE, STAGE 3 (MODERATE)   Status: Chronic   





(12) Hyponatremia


Code(s): E87.1 - HYPO-OSMOLALITY AND HYPONATREMIA   Status: Resolved   





(13) Diastolic dysfunction


Code(s): I51.89 - OTHER ILL-DEFINED HEART DISEASES   Status: Acute   





(14) Moderate mitral regurgitation


Code(s): I34.0 - NONRHEUMATIC MITRAL (VALVE) INSUFFICIENCY   Status: Acute   





(15) Dysphagia


Code(s): R13.10 - DYSPHAGIA, UNSPECIFIED   Status: Acute   





- Plan


Continue isosobide mononitrate to 120 bid, clonidine to 0.3 patch. Hydralazine 

decreased to 50 tid.


Get urinbe electrolytes


Started on IVF by nephrology


Monitor renal function given worsening azotemia


Continue bronchodilators, steroid and antibiotics.


Follow CBC and renal function in the am.


Consult speech for swallowing evaluation.

## 2019-12-28 NOTE — PRG
DATE OF SERVICE:  12/28/2019



SUBJECTIVE:  Ms. Leach is an 85-year-old black female, followed up by the Renal

Service for her chronic renal failure.  Renal function has worsened in the last few

days.  This morning, she is complaining of difficulty swallowing.  She describes

this as her dysphagia is progressive.  This started prior to the hospitalization,

and this morning, she is having hard time swallowing solid foods. 



The patient voices no complaints of chest pain or shortness of breath.



OBJECTIVE:  VITAL SIGNS:  Blood pressure 153/66, heart rate 78, respiratory rate 18,

temperature 98, and pulse ox 95%. 

GENERAL:  Awake, alert, comfortable, not in overt distress. 

SKIN:  Adequate turgor. 

HEENT:  Pinkish conjunctivae.  Anicteric sclerae.  No neck mass.  No carotid bruits.

 No JVD. 

CHEST:  No deformities. 

LUNGS:  Clear breath sounds.  No wheezing.  No crackles. 

HEART:  Normal sinus rhythm.  No murmur.  No gallops.  No rubs. 

ABDOMEN:  Globular, soft, nontender.  No masses. 

EXTREMITIES:  No edema.  No deformities.



MEDICATIONS:  Of December 28, 2019, were reviewed.



LABORATORY DATA:  Laboratories of December 28, 2019, showed the following:  White

count 9.2, hemoglobin 10.2.  Sodium 133, potassium 4.6, chloride 102, carbon dioxide

is 20, , creatinine 2.68, glucose 205, calcium 8.2. 



ASSESSMENT AND PLAN:  

1. Anemia, borderline.  We will continue to observe.  Continue iron supplementation.

2. Dysphagia - progressive in nature.  Consider GI consult.

3. Hypertension, much improved with adjustment of BP medications.

4. Acute/chronic renal failure, worsening renal dysfunction.  I suspect a prerenal

component.  Normal saline 100 mL/h has been started. 

5. Gout - due to the worsening renal dysfunction, I decreased allopurinol from 300

to 150 mg tablet once a day.  Recheck basic metabolics and CBC in a.m. 







Job ID:  [id number]

## 2019-12-29 LAB
ANION GAP SERPL CALC-SCNC: 15 MMOL/L (ref 10–20)
BUN SERPL-MCNC: 112 MG/DL (ref 9.8–20.1)
CALCIUM SERPL-MCNC: 8.2 MG/DL (ref 7.8–10.44)
CHLORIDE SERPL-SCNC: 102 MMOL/L (ref 98–107)
CO2 SERPL-SCNC: 22 MMOL/L (ref 23–31)
CREAT CL PREDICTED SERPL C-G-VRATE: 29 ML/MIN (ref 70–130)
GLUCOSE SERPL-MCNC: 142 MG/DL (ref 83–110)
HGB BLD-MCNC: 10.1 G/DL (ref 12–16)
MCH RBC QN AUTO: 33.5 PG (ref 27–31)
MCV RBC AUTO: 102 FL (ref 78–98)
MDIFF COMPLETE?: YES
PLATELET # BLD AUTO: 187 THOU/UL (ref 130–400)
POTASSIUM SERPL-SCNC: 4.7 MMOL/L (ref 3.5–5.1)
RBC # BLD AUTO: 3.02 MILL/UL (ref 4.2–5.4)
SODIUM SERPL-SCNC: 134 MMOL/L (ref 136–145)
TARGETS BLD QL SMEAR: (no result) (100X)
WBC # BLD AUTO: 13.5 THOU/UL (ref 4.8–10.8)

## 2019-12-29 RX ADMIN — ASPIRIN AND EXTENDED-RELEASE DIPYRIDAMOLE SCH CAP: 25; 200 CAPSULE ORAL at 11:08

## 2019-12-29 RX ADMIN — ASPIRIN AND EXTENDED-RELEASE DIPYRIDAMOLE SCH CAP: 25; 200 CAPSULE ORAL at 20:13

## 2019-12-29 RX ADMIN — MOMETASONE FUROATE AND FORMOTEROL FUMARATE DIHYDRATE SCH: 100; 5 AEROSOL RESPIRATORY (INHALATION) at 08:49

## 2019-12-29 RX ADMIN — MOMETASONE FUROATE AND FORMOTEROL FUMARATE DIHYDRATE SCH PUFF: 100; 5 AEROSOL RESPIRATORY (INHALATION) at 18:51

## 2019-12-29 NOTE — PDOC.HOSPP
- Subjective


Encounter Date: 12/29/19


Encounter Time: 10:46


Subjective: 


86 y/o female with morbid obesity, ISIS on CPAP, HTN, COPD, HLD, CKD and gout 

amongst others admitted with worsening cough and SOB associated with sputum 

production. Also was noted to be in afib on presentation. Started on steroid, 

antibiotics, bronchodilators with improvement. Complaining of difficulty 

swallowing of solids which predated admission. Planned EGD was cancelled due to 

increased risk. No new problem. 





- Objective


Vital Signs & Weight: 


 Vital Signs (12 hours)











  Temp Pulse Resp BP Pulse Ox


 


 12/29/19 14:23   80  15  


 


 12/29/19 11:40  96.5 F L  75  23 H  184/91 H  95


 


 12/29/19 11:07   80   


 


 12/29/19 10:17   80  15  


 


 12/29/19 08:20  98.8 F  81  20  172/74 H  95


 


 12/29/19 04:00  97.9 F  77  20  137/64  94 L


 


 12/29/19 03:15      96








 Weight











Weight                         252 lb














I&O: 


 











 12/28/19 12/29/19 12/30/19





 06:59 06:59 06:59


 


Intake Total 940 1240 


 


Output Total 1040  


 


Balance -100 1240 











Result Diagrams: 


 12/29/19 04:12





 12/29/19 04:12





Hospitalist ROS





- Medication


Medications: 


Active Medications











Generic Name Dose Route Start Last Admin





  Trade Name Freq  PRN Reason Stop Dose Admin


 


Acetaminophen  650 mg  12/21/19 16:05  12/29/19 00:15





  Tylenol  PO   650 mg





  Q4H PRN   Administration





  Headache/Fever/Mild Pain (1-3)   





     





     





     


 


Albuterol/Ipratropium  3 ml  12/21/19 22:30  12/29/19 14:23





  Duoneb  EZPAP   3 ml





  [id number] CORRINE   Administration





     





     





     





     


 


Allopurinol  150 mg  12/29/19 09:00  12/29/19 11:08





  Zyloprim  PO   150 mg





  DAILY CORRINE   Administration





     





     





     





     


 


Atorvastatin Calcium  10 mg  12/21/19 21:00  12/28/19 21:48





  Lipitor  PO   10 mg





  HS CORRINE   Administration





     





     





     





     


 


Benzonatate  200 mg  12/23/19 12:34  12/24/19 18:39





  Tessalon  PO   200 mg





  TIDPRN PRN   Administration





  Cough   





     





     





     


 


Carvedilol  25 mg  12/21/19 21:00  12/29/19 05:28





  Coreg  PO   25 mg





  BID CORRINE   Administration





     





     





     





     


 


Clonidine  0.3 mg  12/26/19 09:00  12/26/19 12:16





  Catapres-Tts-3  TD   0.3 mg





  Q7DAYS CORRINE   Administration





     





     





     





     


 


Dipyridamole/Aspirin  1 cap  12/21/19 21:00  12/29/19 11:08





  Aggrenox  PO   1 cap





  BID CORRINE   Administration





     





     





     





     


 


Duloxetine HCl  60 mg  12/22/19 09:00  12/29/19 11:07





  Cymbalta  PO   60 mg





  DAILY CORRINE   Administration





     





     





     





     


 


Famotidine  20 mg  12/21/19 21:00  12/28/19 21:48





  Pepcid  PO   20 mg





  QPM CORRINE   Administration





     





     





     





     


 


Gabapentin  300 mg  12/21/19 21:00  12/28/19 21:48





  Neurontin  PO   300 mg





  HS CORRINE   Administration





     





     





     





     


 


Guaifenesin  600 mg  12/23/19 21:00  12/29/19 11:07





  Mucinex  PO   600 mg





  BID CORRINE   Administration





     





     





     





     


 


Hydralazine HCl  50 mg  12/27/19 21:00  12/29/19 11:07





  Apresoline  PO   50 mg





  TID CORRINE   Administration





     





     





     





     


 


Sodium Chloride  1,000 mls @ 100 mls/hr  12/28/19 09:15  12/29/19 05:28





  Normal Saline 0.9%  IV   1,000 mls





  .Q10H CORRINE   Administration





     





     





     





     


 


Isosorbide Mononitrate  120 mg  12/26/19 21:00  12/29/19 11:07





  Imdur  PO   120 mg





  BID CORRINE   Administration





     





     





     





     


 


Mometasone Furoate/Formoterol Fumar  2 puff  12/22/19 06:30  12/29/19 08:49





  Dulera 100 Mcg/5 Mcg Inhaler  INH   Not Given





  BID-RT CORRINE   





     





     





     





     


 


Prednisone  40 mg  12/26/19 08:00  12/29/19 11:08





  Prednisone  PO   40 mg





  QAM-WM CORRINE   Administration





     





     





     





     














- Exam


General Appearance: awake alert


General - other findings: morbidly obese


Eye: anicteric sclera


ENT: normocephalic atraumatic


Neck: no JVD


Heart: RRR, murmur present


Respiratory: no wheezes, no ronchi, normal chest expansion, no tachypnea


Respiratory - other findings: fair air entry with transmitted sound


Gastrointestinal: soft, non-tender, non-distended, normal bowel sounds


Extremities: 1+ LE edema


Neurological: cranial nerve grossly intact, no focal deficits


Psychiatric: A&O x 3





Hosp A/P


(1) Acute kidney injury


Code(s): N17.9 - ACUTE KIDNEY FAILURE, UNSPECIFIED   Status: Resolved   





(2) RSV (respiratory syncytial virus infection)


Code(s): B97.4 - RESPIRATORY SYNCYTIAL VIRUS CAUSING DISEASES CLASSD ELSWHR   

Status: Acute   





(3) COPD with exacerbation


Code(s): J44.1 - CHRONIC OBSTRUCTIVE PULMONARY DISEASE W (ACUTE) EXACERBATION   

Status: Acute   





(4) Hypertensive urgency, malignant


Code(s): I16.0 - HYPERTENSIVE URGENCY   Status: Resolved   





(5) Paroxysmal atrial fibrillation


Code(s): I48.0 - PAROXYSMAL ATRIAL FIBRILLATION   Status: Acute   





(6) Elevated troponin


Code(s): R79.89 - OTHER SPECIFIED ABNORMAL FINDINGS OF BLOOD CHEMISTRY   Status

: Acute   





(7) Gout


Code(s): M10.9 - GOUT, UNSPECIFIED   Status: Acute   





(8) Morbid obesity


Code(s): E66.01 - MORBID (SEVERE) OBESITY DUE TO EXCESS CALORIES   Status: 

Acute   





(9) ISIS on CPAP


Code(s): G47.33 - OBSTRUCTIVE SLEEP APNEA (ADULT) (PEDIATRIC); Z99.89 - 

DEPENDENCE ON OTHER ENABLING MACHINES AND DEVICES   Status: Acute   





(10) Proteinuria


Code(s): R80.9 - PROTEINURIA, UNSPECIFIED   Status: Acute   





(11) CKD (chronic kidney disease) stage 3, GFR 30-59 ml/min


Code(s): N18.3 - CHRONIC KIDNEY DISEASE, STAGE 3 (MODERATE)   Status: Chronic   





(12) Hyponatremia


Code(s): E87.1 - HYPO-OSMOLALITY AND HYPONATREMIA   Status: Resolved   





(13) Diastolic dysfunction


Code(s): I51.89 - OTHER ILL-DEFINED HEART DISEASES   Status: Acute   





(14) Moderate mitral regurgitation


Code(s): I34.0 - NONRHEUMATIC MITRAL (VALVE) INSUFFICIENCY   Status: Acute   





(15) Dysphagia


Code(s): R13.10 - DYSPHAGIA, UNSPECIFIED   Status: Acute   





- Plan


gentle IVF therapy as per Nephrology. Will monitor closely for fluid overload. 

urine electrolytes and FeNA is consistent with prerenal etiology of thong


Increase hydralazine to 75 tid. Continue isosobide mononitrate to 120 bid, 

clonidine to 0.3 patch. 


Monitor renal function given worsening azotemia


Continue bronchodilators, steroid and antibiotics.


Follow CBC and renal function in the am.


Follow renal function.

## 2019-12-29 NOTE — PRG
DATE OF SERVICE:  12/29/2019



SUBJECTIVE:  This is an 85-year-old  female, seen me because of

dysphagia.  The patient has dysphagia of recent onset.  The dysphagia is mostly to

solid food.  Plan was for EGD today, but unfortunately the Anesthesia Service did

not feel comfortable beginning the EGD because of her low PO2.  The patient has no

complaints.  Does not have any difficulty breathing or chest pain. 



PHYSICAL EXAMINATION:

GENERAL:  She is obese, appears comfortable. 

VITAL SIGNS:  Afebrile.  Pulse is 75, blood pressure 184/91. 

CARDIOVASCULAR SYSTEM:  First and second heart sounds are normal. 

LUNGS:  Actually clear to auscultation. 

ABDOMEN:  Soft.  Abdomen is nontender.



CLINICAL IMPRESSION:  Dysphagia, awaiting EGD, but unfortunately this cannot be done

because of some low O2 saturation.  We will plan for EGD in the near future. 







Job ID:  [id number]

## 2019-12-29 NOTE — PRG
DATE OF SERVICE:  12/29/2019



SUBJECTIVE:  Ms. Leach is an 85-year-old black female, followed by the Renal

Service for her acute kidney injury/chronic renal failure.  She has underlying

hypertensive renovascular disease.  The patient was complaining of progressive

dysphagia.  She was to undergo an upper GI endoscopy, but was placed on hold due to

hypoxemia.  She has been evaluated by Pulmonary.  She may have underlying

COPD/exacerbation.  CT scan of the chest has been done.  In addition, her cardiac

echo showed a normal EF. 



The patient voices no other complaints today.



OBJECTIVE:  VITAL SIGNS:  Blood pressure is 172/74, heart rate 81, respiratory rate

20, temperature 98.8, and pulse ox 95%. 

GENERAL:  Noted to be awake, alert, and comfortable, not in distress. 

SKIN:  Adequate turgor. 

HEENT:  Pinkish conjunctivae.  Anicteric sclerae. 

NECK:  No neck mass.  No carotid bruits.  No JVD. 

CHEST:  No deformities. 

LUNGS:  Decreased breath sounds. 

HEART:  Normal sinus rhythm.  No murmur.  No gallops.  No rubs. 

ABDOMEN:  Soft and nontender.  No masses. 

EXTREMITIES:  No edema.  No deformities.



MEDICATIONS:  Medications of December 29, 2019, were reviewed.



LABORATORY DATA:  Laboratories of December 29, 2019; white count 13.5, hemoglobin

10.1.  Sodium 134, potassium 4.7, chloride 102, carbon dioxide 22, ,

creatinine 2.53, glucose 142, and calcium 8.2. 



ASSESSMENT AND PLAN:  

1. Hypoxemia-secondary to a chronic obstructive pulmonary disease exacerbation.

Continue supportive care. 

2. Dysphagia-currently upper gastrointestinal endoscopy on hold until the patient's

pulmonary status is much more stable. 

3. Acute kidney injury/chronic renal failure.  Continue supportive care.  Currently,

on normal saline at 100 mL/h.  There is some stabilization of the renal function.

Creatinine noted at 2.5 and yesterday this was 2.68.  Continue gentle volume

repletion.  Please note that the cardiac echo showed a normal EF and a CT scan done

several days ago showed no overt pulmonary edema there. 

4. Hypertension, much improved with adjustment of her BP medications.

5. Agree with current management.  Recheck basic metabolic in a.m.







Job ID:  [id number]

## 2019-12-30 LAB
ANION GAP SERPL CALC-SCNC: 12 MMOL/L (ref 10–20)
BUN SERPL-MCNC: 106 MG/DL (ref 9.8–20.1)
CALCIUM SERPL-MCNC: 8.5 MG/DL (ref 7.8–10.44)
CHLORIDE SERPL-SCNC: 103 MMOL/L (ref 98–107)
CO2 SERPL-SCNC: 26 MMOL/L (ref 23–31)
CREAT CL PREDICTED SERPL C-G-VRATE: 34 ML/MIN (ref 70–130)
GLUCOSE SERPL-MCNC: 205 MG/DL (ref 83–110)
HGB BLD-MCNC: 10.1 G/DL (ref 12–16)
MCH RBC QN AUTO: 33.7 PG (ref 27–31)
MCV RBC AUTO: 102 FL (ref 78–98)
MDIFF COMPLETE?: YES
PLATELET # BLD AUTO: 176 THOU/UL (ref 130–400)
POTASSIUM SERPL-SCNC: 4.8 MMOL/L (ref 3.5–5.1)
RBC # BLD AUTO: 3 MILL/UL (ref 4.2–5.4)
SODIUM SERPL-SCNC: 136 MMOL/L (ref 136–145)
WBC # BLD AUTO: 11.8 THOU/UL (ref 4.8–10.8)

## 2019-12-30 RX ADMIN — MOMETASONE FUROATE AND FORMOTEROL FUMARATE DIHYDRATE SCH PUFF: 100; 5 AEROSOL RESPIRATORY (INHALATION) at 06:44

## 2019-12-30 RX ADMIN — ASPIRIN AND EXTENDED-RELEASE DIPYRIDAMOLE SCH CAP: 25; 200 CAPSULE ORAL at 21:16

## 2019-12-30 RX ADMIN — ASPIRIN AND EXTENDED-RELEASE DIPYRIDAMOLE SCH CAP: 25; 200 CAPSULE ORAL at 08:29

## 2019-12-30 RX ADMIN — MOMETASONE FUROATE AND FORMOTEROL FUMARATE DIHYDRATE SCH PUFF: 100; 5 AEROSOL RESPIRATORY (INHALATION) at 18:52

## 2019-12-30 NOTE — PDOC.HOSPP
- Subjective


Encounter Date: 12/30/19


Encounter Time: 09:19


Subjective: 


86 y/o female with morbid obesity, ISIS on CPAP, HTN, COPD, HLD, CKD and gout 

amongst others admitted with worsening cough and SOB associated with sputum 

production. Also was noted to be in afib on presentation. Started on steroid, 

antibiotics, bronchodilators with improvement. Cough is begining to be 

productive. EGD for evaluation of dysphagia was cancelled due to increased risk/

hypoxia. No new problem. Feeling better. Oral intake is still suboptimal.





- Objective


Vital Signs & Weight: 


 Vital Signs (12 hours)











  Temp Pulse Resp BP Pulse Ox


 


 12/30/19 14:10   91  16   100


 


 12/30/19 11:24  97.8 F  84  17  125/60  95


 


 12/30/19 10:38   89  14   100


 


 12/30/19 08:20  98.5 F  91  20  170/72 H  91 L


 


 12/30/19 06:41   85  14   100








 Weight











Admit Weight                   243 lb


 


Weight                         252 lb














I&O: 


 











 12/29/19 12/30/19 12/31/19





 06:59 06:59 06:59


 


Intake Total 1240  


 


Balance 1240  











Result Diagrams: 


 12/30/19 04:08





 12/30/19 04:08





Hospitalist ROS





- Medication


Medications: 


Active Medications











Generic Name Dose Route Start Last Admin





  Trade Name Freq  PRN Reason Stop Dose Admin


 


Acetaminophen  650 mg  12/21/19 16:05  12/30/19 14:41





  Tylenol  PO   650 mg





  Q4H PRN   Administration





  Headache/Fever/Mild Pain (1-3)   





     





     





     


 


Albuterol/Ipratropium  3 ml  12/21/19 22:30  12/30/19 14:10





  Duoneb  EZPAP   3 ml





  [id number]-BI CORRINE   Administration





     





     





     





     


 


Allopurinol  150 mg  12/29/19 09:00  12/30/19 08:30





  Zyloprim  PO   150 mg





  DAILY CORRINE   Administration





     





     





     





     


 


Atorvastatin Calcium  10 mg  12/21/19 21:00  12/29/19 20:13





  Lipitor  PO   10 mg





  HS CORRINE   Administration





     





     





     





     


 


Benzonatate  200 mg  12/23/19 12:34  12/24/19 18:39





  Tessalon  PO   200 mg





  TIDPRN PRN   Administration





  Cough   





     





     





     


 


Carvedilol  25 mg  12/21/19 21:00  12/30/19 08:30





  Coreg  PO   25 mg





  BID CORRINE   Administration





     





     





     





     


 


Clonidine  0.3 mg  12/26/19 09:00  12/26/19 12:16





  Catapres-Tts-3  TD   0.3 mg





  Q7DAYS CORRINE   Administration





     





     





     





     


 


Dipyridamole/Aspirin  1 cap  12/21/19 21:00  12/30/19 08:29





  Aggrenox  PO   1 cap





  BID CORRINE   Administration





     





     





     





     


 


Duloxetine HCl  60 mg  12/22/19 09:00  12/30/19 08:29





  Cymbalta  PO   60 mg





  DAILY CORRINE   Administration





     





     





     





     


 


Famotidine  20 mg  12/21/19 21:00  12/29/19 20:13





  Pepcid  PO   20 mg





  QPM CORRINE   Administration





     





     





     





     


 


Gabapentin  300 mg  12/21/19 21:00  12/29/19 20:13





  Neurontin  PO   300 mg





  HS CORRINE   Administration





     





     





     





     


 


Guaifenesin  600 mg  12/23/19 21:00  12/30/19 08:31





  Mucinex  PO   600 mg





  BID CORRINE   Administration





     





     





     





     


 


Hydralazine HCl  75 mg  12/29/19 15:00  12/30/19 14:40





  Apresoline  PO   75 mg





  TID CORRINE   Administration





     





     





     





     


 


Isosorbide Mononitrate  120 mg  12/26/19 21:00  12/30/19 08:31





  Imdur  PO   120 mg





  BID CORRINE   Administration





     





     





     





     


 


Mometasone Furoate/Formoterol Fumar  2 puff  12/22/19 06:30  12/30/19 06:44





  Dulera 100 Mcg/5 Mcg Inhaler  INH   2 puff





  BID-RT CORRINE   Administration





     





     





     





     


 


Prednisone  40 mg  12/26/19 08:00  12/30/19 08:30





  Prednisone  PO   40 mg





  QAM-WM CORRINE   Administration





     





     





     





     














- Exam


General Appearance: awake alert


General - other findings: obese


Eye: anicteric sclera


ENT: normocephalic atraumatic, moist mucosa


Neck: symmetric, no JVD


Heart: RRR


Respiratory - other findings: fair air entry with crackles and transmitted 

sound. No rhonchi or distress


Gastrointestinal: soft, non-tender, non-distended, normal bowel sounds


Extremities - other findings: trace to mild bilateral leg edema


Neurological: cranial nerve grossly intact, no focal deficits


Psychiatric: A&O x 3





Hosp A/P


(1) Acute kidney injury


Code(s): N17.9 - ACUTE KIDNEY FAILURE, UNSPECIFIED   Status: Resolved   





(2) RSV (respiratory syncytial virus infection)


Code(s): B97.4 - RESPIRATORY SYNCYTIAL VIRUS CAUSING DISEASES CLASSD ELSWHR   

Status: Acute   





(3) COPD with exacerbation


Code(s): J44.1 - CHRONIC OBSTRUCTIVE PULMONARY DISEASE W (ACUTE) EXACERBATION   

Status: Acute   





(4) Hypertensive urgency, malignant


Code(s): I16.0 - HYPERTENSIVE URGENCY   Status: Resolved   





(5) Paroxysmal atrial fibrillation


Code(s): I48.0 - PAROXYSMAL ATRIAL FIBRILLATION   Status: Acute   





(6) Elevated troponin


Code(s): R79.89 - OTHER SPECIFIED ABNORMAL FINDINGS OF BLOOD CHEMISTRY   Status

: Acute   





(7) Gout


Code(s): M10.9 - GOUT, UNSPECIFIED   Status: Acute   





(8) Morbid obesity


Code(s): E66.01 - MORBID (SEVERE) OBESITY DUE TO EXCESS CALORIES   Status: 

Acute   





(9) ISIS on CPAP


Code(s): G47.33 - OBSTRUCTIVE SLEEP APNEA (ADULT) (PEDIATRIC); Z99.89 - 

DEPENDENCE ON OTHER ENABLING MACHINES AND DEVICES   Status: Acute   





(10) Proteinuria


Code(s): R80.9 - PROTEINURIA, UNSPECIFIED   Status: Acute   





(11) CKD (chronic kidney disease) stage 3, GFR 30-59 ml/min


Code(s): N18.3 - CHRONIC KIDNEY DISEASE, STAGE 3 (MODERATE)   Status: Chronic   





(12) Hyponatremia


Code(s): E87.1 - HYPO-OSMOLALITY AND HYPONATREMIA   Status: Resolved   





(13) Diastolic dysfunction


Code(s): I51.89 - OTHER ILL-DEFINED HEART DISEASES   Status: Acute   





(14) Moderate mitral regurgitation


Code(s): I34.0 - NONRHEUMATIC MITRAL (VALVE) INSUFFICIENCY   Status: Acute   





(15) Dysphagia


Code(s): R13.10 - DYSPHAGIA, UNSPECIFIED   Status: Acute   





- Plan


Continue IVF therapy as per Nephrology. Creat and BUN are trending down. 

Azotemia is still significant with BUN of 106.


Continue antihypertensives and adjust to get adequate control.. 


Monitor renal function


Continue bronchodilators, steroid and antibiotics.


Follow CBC and renal function in the am.


PT/OT to continue

## 2019-12-30 NOTE — PRG
DATE OF SERVICE:  12/30/2019



SUBJECTIVE:  Ms. Leach is an 85-year-old black female, followed up by the Renal

Service for acute kidney injury on top of her chronic renal failure.  Her upper GI

endoscopy was placed on hold due to hypoxemia.  The patient has a planned upper GI

endoscopy, which will be rescheduled by Dr. Almanzar.  She is feeling better this

morning.  She also was started on IV fluid due to the worsening renal dysfunction

and I felt that worsening renal dysfunction was from a prerenal etiology due to the

decreased p.o. intake of this patient.  She has been complaining of progressive

dysphagia.  No other complaints today.  No chest pain. 



OBJECTIVE:  VITAL SIGNS:  Blood pressure 179/77-before BP medications, heart rate

85, respiratory rate 14, O2 saturation 100% on 2 L. 

GENERAL:  Awake, alert, comfortable, obese, not in distress. 

SKIN:  Adequate turgor. 

HEENT:  Pinkish conjunctivae.  Anicteric sclerae. 

NECK:  No neck mass.  No carotid bruits.  No JVD. 

CHEST:  No deformities. 

LUNGS:  Clear breath sounds. 

HEART:  Normal sinus rhythm.  No murmur.  No gallops.  No rubs. 

ABDOMEN:  Globular.  Soft, nontender.  No masses. 

EXTREMITIES:  No edema.  No deformities.



MEDICATIONS:  Medications of December 30, 2019, were reviewed.



LABORATORY DATA:  Laboratories of December 30, 2019; white count 11.8, hemoglobin

10.1.  Sodium 136, potassium 4.8, chloride 103, carbon dioxide 26, ,

creatinine 2.17, glucose 205, calcium 8.5.  . 



ASSESSMENT AND PLAN:  

1. Acute kidney injury on top of her chronic renal failure-superimposed prerenal

azotemia.  Slowly improving renal function with gentle volume repletion.  IV fluid

was decreased from 100 to 75 mL yesterday.  She seems to be tolerating this. 

2. 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  Nearing

baseline creatinine.  No indication for any emergent dialytic intervention. 

3. Chronic obstructive pulmonary disease exacerbation.  Supportive care.

4. Borderline anemia.  We will continue to observe.

5. Dysphagia-Gastrointestinal following for future upper gastrointestinal endoscopy

when the patient is more stable. 

6. Labile hypertension.  Currently, blood pressure medications have been adjusted.

Improving over time. 

7. Recheck basic metabolic and CBC in a.m.







Job ID:  [id number]

## 2019-12-31 LAB
ALBUMIN SERPL BCG-MCNC: 2.7 G/DL (ref 3.4–4.8)
ANION GAP SERPL CALC-SCNC: 11 MMOL/L (ref 10–20)
BASOPHILS # BLD AUTO: 0 THOU/UL (ref 0–0.2)
BASOPHILS NFR BLD AUTO: 0.1 % (ref 0–1)
BUN SERPL-MCNC: 102 MG/DL (ref 9.8–20.1)
BUN/CREAT SERPL: 48.11
CALCIUM SERPL-MCNC: 9 MG/DL (ref 7.8–10.44)
CHLORIDE SERPL-SCNC: 107 MMOL/L (ref 98–107)
CO2 SERPL-SCNC: 25 MMOL/L (ref 23–31)
CREAT CL PREDICTED SERPL C-G-VRATE: 35 ML/MIN (ref 70–130)
EOSINOPHIL # BLD AUTO: 0 THOU/UL (ref 0–0.7)
EOSINOPHIL NFR BLD AUTO: 0.3 % (ref 0–10)
GLUCOSE SERPL-MCNC: 151 MG/DL (ref 83–110)
HGB BLD-MCNC: 10.9 G/DL (ref 12–16)
LYMPHOCYTES # BLD: 1 THOU/UL (ref 1.2–3.4)
LYMPHOCYTES NFR BLD AUTO: 8.2 % (ref 21–51)
MCH RBC QN AUTO: 33 PG (ref 27–31)
MCV RBC AUTO: 105 FL (ref 78–98)
MONOCYTES # BLD AUTO: 1 THOU/UL (ref 0.11–0.59)
MONOCYTES NFR BLD AUTO: 8.4 % (ref 0–10)
NEUTROPHILS # BLD AUTO: 9.9 THOU/UL (ref 1.4–6.5)
NEUTROPHILS NFR BLD AUTO: 83 % (ref 42–75)
PLATELET # BLD AUTO: 170 THOU/UL (ref 130–400)
POTASSIUM SERPL-SCNC: 5.4 MMOL/L (ref 3.5–5.1)
RBC # BLD AUTO: 3.31 MILL/UL (ref 4.2–5.4)
SODIUM SERPL-SCNC: 138 MMOL/L (ref 136–145)
WBC # BLD AUTO: 11.9 THOU/UL (ref 4.8–10.8)

## 2019-12-31 RX ADMIN — MOMETASONE FUROATE AND FORMOTEROL FUMARATE DIHYDRATE SCH PUFF: 100; 5 AEROSOL RESPIRATORY (INHALATION) at 07:27

## 2019-12-31 RX ADMIN — ASPIRIN AND EXTENDED-RELEASE DIPYRIDAMOLE SCH CAP: 25; 200 CAPSULE ORAL at 09:44

## 2019-12-31 RX ADMIN — MOMETASONE FUROATE AND FORMOTEROL FUMARATE DIHYDRATE SCH PUFF: 100; 5 AEROSOL RESPIRATORY (INHALATION) at 19:30

## 2019-12-31 RX ADMIN — ASPIRIN AND EXTENDED-RELEASE DIPYRIDAMOLE SCH CAP: 25; 200 CAPSULE ORAL at 21:21

## 2019-12-31 NOTE — PDOC.HOSPP
- Subjective


Encounter Date: 12/31/19


Encounter Time: 08:52


Subjective: 


86 y/o female with morbid obesity, ISIS on CPAP, HTN, COPD, HLD, CKD and gout 

amongst others admitted with worsening cough and SOB associated with sputum 

production. Also was noted to be in afib on presentation. Started on steroid, 

antibiotics, bronchodilators with improvement. Further eval revealed RSV 

infection. Cough is begining to be productive. EGD for evaluation of dysphagia 

was cancelled due to increased risk/hypoxia. No new problem. Feeling better. 

Oral intake is still suboptimal. Remained afebrile





- Objective


Vital Signs & Weight: 


 Vital Signs (12 hours)











  Temp Pulse Resp BP BP Pulse Ox


 


 12/31/19 11:24  97.5 F L  80  22 H  160/72 H   97


 


 12/31/19 10:24   87  16    96


 


 12/31/19 09:45   82    


 


 12/31/19 08:00  97.2 F L  80  18   159/69 H  97


 


 12/31/19 07:24   82  16    98


 


 12/31/19 04:00  98.6 F  77  20  180/72 H   96


 


 12/31/19 02:30   85  16    96








 Weight











Admit Weight                   243 lb


 


Weight                         252 lb














I&O: 


 











 12/30/19 12/31/19 01/01/20





 06:59 06:59 06:59


 


Intake Total  2720 


 


Balance  2720 











Result Diagrams: 


 12/31/19 04:20





 12/31/19 04:20


Additional Labs: 


 Accuchecks











  12/30/19





  16:57


 


POC Glucose  245 H














Hospitalist ROS





- Medication


Medications: 


Active Medications











Generic Name Dose Route Start Last Admin





  Trade Name Freq  PRN Reason Stop Dose Admin


 


Acetaminophen  650 mg  12/21/19 16:05  12/30/19 14:41





  Tylenol  PO   650 mg





  Q4H PRN   Administration





  Headache/Fever/Mild Pain (1-3)   





     





     





     


 


Albuterol/Ipratropium  3 ml  12/21/19 22:30  12/31/19 10:24





  Duoneb  EZPAP   3 ml





  [id number] CORRINE   Administration





     





     





     





     


 


Allopurinol  150 mg  12/29/19 09:00  12/31/19 09:45





  Zyloprim  PO   150 mg





  DAILY CORRINE   Administration





     





     





     





     


 


Atorvastatin Calcium  10 mg  12/21/19 21:00  12/30/19 21:16





  Lipitor  PO   10 mg





  HS CORRINE   Administration





     





     





     





     


 


Benzonatate  200 mg  12/23/19 12:34  12/24/19 18:39





  Tessalon  PO   200 mg





  TIDPRN PRN   Administration





  Cough   





     





     





     


 


Carvedilol  25 mg  12/21/19 21:00  12/31/19 09:44





  Coreg  PO   25 mg





  BID CORRINE   Administration





     





     





     





     


 


Clonidine  0.3 mg  12/26/19 09:00  12/26/19 12:16





  Catapres-Tts-3  TD   0.3 mg





  Q7DAYS CORRINE   Administration





     





     





     





     


 


Dipyridamole/Aspirin  1 cap  12/21/19 21:00  12/31/19 09:44





  Aggrenox  PO   1 cap





  BID CORRINE   Administration





     





     





     





     


 


Duloxetine HCl  60 mg  12/22/19 09:00  12/31/19 09:44





  Cymbalta  PO   60 mg





  DAILY CORRINE   Administration





     





     





     





     


 


Famotidine  20 mg  12/21/19 21:00  12/30/19 21:16





  Pepcid  PO   20 mg





  QPM CORRINE   Administration





     





     





     





     


 


Gabapentin  300 mg  12/21/19 21:00  12/30/19 21:16





  Neurontin  PO   300 mg





  HS CORRINE   Administration





     





     





     





     


 


Guaifenesin  600 mg  12/23/19 21:00  12/31/19 09:45





  Mucinex  PO   600 mg





  BID CORRINE   Administration





     





     





     





     


 


Hydralazine HCl  75 mg  12/29/19 15:00  12/31/19 09:45





  Apresoline  PO   75 mg





  TID CORRINE   Administration





     





     





     





     


 


Isosorbide Mononitrate  120 mg  12/26/19 21:00  12/31/19 09:45





  Imdur  PO   120 mg





  BID CORRINE   Administration





     





     





     





     


 


Mometasone Furoate/Formoterol Fumar  2 puff  12/22/19 06:30  12/31/19 07:27





  Dulera 100 Mcg/5 Mcg Inhaler  INH   2 puff





  BID-RT CORRINE   Administration





     





     





     





     


 


Prednisone  40 mg  12/26/19 08:00  12/31/19 09:44





  Prednisone  PO   40 mg





  QAM-WM CORRINE   Administration





     





     





     





     














- Exam


General Appearance: awake alert


General - other findings: obese


Eye: anicteric sclera


ENT: normocephalic atraumatic, moist mucosa


Neck: symmetric, no JVD


Heart: RRR


Respiratory - other findings: fair air entry bilaterally with few transmitted 

sound and rhonchi


Gastrointestinal: soft, non-tender, non-distended, normal bowel sounds


Extremities - other findings: trace leg edema


Neurological: cranial nerve grossly intact, no focal deficits





Hosp A/P


(1) Acute kidney injury


Code(s): N17.9 - ACUTE KIDNEY FAILURE, UNSPECIFIED   Status: Resolved   





(2) RSV (respiratory syncytial virus infection)


Code(s): B97.4 - RESPIRATORY SYNCYTIAL VIRUS CAUSING DISEASES CLASSD ELSWHR   

Status: Acute   





(3) COPD with exacerbation


Code(s): J44.1 - CHRONIC OBSTRUCTIVE PULMONARY DISEASE W (ACUTE) EXACERBATION   

Status: Acute   





(4) Hypertensive urgency, malignant


Code(s): I16.0 - HYPERTENSIVE URGENCY   Status: Resolved   





(5) Paroxysmal atrial fibrillation


Code(s): I48.0 - PAROXYSMAL ATRIAL FIBRILLATION   Status: Acute   





(6) Elevated troponin


Code(s): R79.89 - OTHER SPECIFIED ABNORMAL FINDINGS OF BLOOD CHEMISTRY   Status

: Acute   





(7) Gout


Code(s): M10.9 - GOUT, UNSPECIFIED   Status: Acute   





(8) Morbid obesity


Code(s): E66.01 - MORBID (SEVERE) OBESITY DUE TO EXCESS CALORIES   Status: 

Acute   





(9) ISIS on CPAP


Code(s): G47.33 - OBSTRUCTIVE SLEEP APNEA (ADULT) (PEDIATRIC); Z99.89 - 

DEPENDENCE ON OTHER ENABLING MACHINES AND DEVICES   Status: Acute   





(10) Proteinuria


Code(s): R80.9 - PROTEINURIA, UNSPECIFIED   Status: Acute   





(11) CKD (chronic kidney disease) stage 3, GFR 30-59 ml/min


Code(s): N18.3 - CHRONIC KIDNEY DISEASE, STAGE 3 (MODERATE)   Status: Chronic   





(12) Hyponatremia


Code(s): E87.1 - HYPO-OSMOLALITY AND HYPONATREMIA   Status: Resolved   





(13) Diastolic dysfunction


Code(s): I51.89 - OTHER ILL-DEFINED HEART DISEASES   Status: Acute   





(14) Moderate mitral regurgitation


Code(s): I34.0 - NONRHEUMATIC MITRAL (VALVE) INSUFFICIENCY   Status: Acute   





(15) Dysphagia


Code(s): R13.10 - DYSPHAGIA, UNSPECIFIED   Status: Acute   





- Plan


Off IVF therapy as per Nephrology. Creat and BUN are trending down. Azotemia is 

still significant with BUN of 102.


Continue antihypertensives and adjust to get adequate control.. 


Monitor renal function


Continue bronchodilators, steroid and antibiotics.


Start incentive spirometry


Follow CBC and renal function in the am.


PT/OT to continue


For discharge once cleared by Nephrology

## 2019-12-31 NOTE — PRG
DATE OF SERVICE:  12/31/2019



SUBJECTIVE:  Ms. Leach is an 85-year-old black female with known history of chronic

renal failure, and had an acute kidney injury that was prerenal due to decreased

p.o. intake with this patient.  She was given IV volume repletion with improvement

of renal function.  Her p.o. intake slightly improved.  For that reason, her IV

fluid has been discontinued.  She also had a COPD exacerbation with a possible viral

infection.  An upper GI endoscopy was placed on hold due to hypoxemia.  No other

complaints today.  No chest pain or any worsening shortness of breath. 



OBJECTIVE:  VITAL SIGNS:  Blood pressure is 180/72, heart rate 77, respiratory rate

20, temperature 98.6, and pulse ox 96% on 2 L. 

GENERAL:  Awake, alert, and comfortable, not in distress. 

SKIN:  Adequate turgor. 

HEENT:  She has a pinkish conjunctivae.  Anicteric sclerae. 

NECK:  No neck mass.  No carotid bruits.  No JVD. 

CHEST:  No deformities. 

LUNGS:  Clear breath sounds. 

HEART:  Normal sinus rhythm.  No murmurs.  No gallops.  No rubs. 

ABDOMEN:  Soft and nontender.  No masses. 

EXTREMITIES:  No edema.  No deformities.



MEDICATIONS:  Medications of December 31, 2019, was reviewed.



LABORATORY DATA:  Laboratories of December 31, 2019; sodium 138, potassium 5.4,

chloride 107, carbon dioxide 25, , creatinine 2.12, GFR 27 mL/minute, glucose

151, calcium 9.0, phosphorus 2.8, and albumin 2.7. 



ASSESSMENT AND PLAN:  

1. Acute kidney injury - superimposed prerenal azotemia, improving renal function.

Creatinine noted 2.12.  IV fluid is off.  I encouraged the patient to increase her

p.o. intake. 

2. Chronic renal failure secondary to a possible renovascular disease/hypertensive

nephropathy.  Continue supportive care.  No indication for any dialytic

intervention. 

3. Shortness of breath - multifactorial etiology - most likely from a chronic

obstructive pulmonary disease exacerbation with respiratory viral infection.

Pulmonary is following. 

4. Labile hypertension.  Continue current BP medications.  We will recheck basic

metabolic and CBC in a.m. 







Job ID:  [id number]

## 2020-01-01 LAB
ANION GAP SERPL CALC-SCNC: 12 MMOL/L (ref 10–20)
BUN SERPL-MCNC: 98 MG/DL (ref 9.8–20.1)
CALCIUM SERPL-MCNC: 9.3 MG/DL (ref 7.8–10.44)
CHLORIDE SERPL-SCNC: 108 MMOL/L (ref 98–107)
CO2 SERPL-SCNC: 25 MMOL/L (ref 23–31)
CREAT CL PREDICTED SERPL C-G-VRATE: 38 ML/MIN (ref 70–130)
GLUCOSE SERPL-MCNC: 182 MG/DL (ref 83–110)
HGB BLD-MCNC: 10.4 G/DL (ref 12–16)
MCH RBC QN AUTO: 33.3 PG (ref 27–31)
MCV RBC AUTO: 104 FL (ref 78–98)
MDIFF COMPLETE?: YES
PLATELET # BLD AUTO: 196 THOU/UL (ref 130–400)
POTASSIUM SERPL-SCNC: 5.4 MMOL/L (ref 3.5–5.1)
RBC # BLD AUTO: 3.13 MILL/UL (ref 4.2–5.4)
SODIUM SERPL-SCNC: 140 MMOL/L (ref 136–145)
WBC # BLD AUTO: 17.1 THOU/UL (ref 4.8–10.8)

## 2020-01-01 RX ADMIN — ASPIRIN AND EXTENDED-RELEASE DIPYRIDAMOLE SCH CAP: 25; 200 CAPSULE ORAL at 21:55

## 2020-01-01 RX ADMIN — ASPIRIN AND EXTENDED-RELEASE DIPYRIDAMOLE SCH CAP: 25; 200 CAPSULE ORAL at 08:51

## 2020-01-01 RX ADMIN — MOMETASONE FUROATE AND FORMOTEROL FUMARATE DIHYDRATE SCH PUFF: 100; 5 AEROSOL RESPIRATORY (INHALATION) at 06:57

## 2020-01-01 RX ADMIN — MOMETASONE FUROATE AND FORMOTEROL FUMARATE DIHYDRATE SCH PUFF: 100; 5 AEROSOL RESPIRATORY (INHALATION) at 19:15

## 2020-01-01 NOTE — PRG
DATE OF SERVICE:  01/01/2020



SERVICE:  Renal Medicine.



SUBJECTIVE:  Ms. Leach is an 85-year-old black female with known history of chronic

renal failure and seen for superimposed acute kidney injury that is

hemodynamically-mediated renal dysfunction.  She has been also having episodes of

COPD exacerbation/respiratory viral infection.  This morning, she is complaining of

pain on the right parotid area.  Examination shows she has an enlarged parotid

gland.  She most likely has a parotitis. 



OBJECTIVE:  VITAL SIGNS:  Blood pressure is noted at 152/69, heart rate 85,

respiratory rate 20, temperature 97.5, and pulse ox 92%. 

GENERAL:  The patient is awake, alert, comfortable, in mild pain, not in

cardiorespiratory distress. 

SKIN:  Adequate turgor. 

HEENT:  She has pinkish conjunctivae.  Anicteric sclerae.  No neck mass.  No carotid

bruits.  No JVD. 

CHEST:  No deformities. 

LUNGS:  Clear breath sounds. 

HEART:  Normal sinus rhythm.  No murmurs.  No gallops.  No rubs. 

ABDOMEN:  Globular, soft, and nontender. 

EXTREMITIES:  No edema.



MEDICATIONS:  Medications of January 1, 2020, were reviewed.



LABORATORY DATA:  Laboratories of January 1, 2020:  White count 17.1, hemoglobin

10.4.  Sodium 140, potassium 5.4, chloride 108, carbon dioxide 25, BUN 98,

creatinine 1.94, glucose 182, calcium 9.3, and albumin 2.7. 



ASSESSMENT AND PLAN:  

1. Acute kidney injury - superimposed hemodynamically-mediated renal dysfunction,

stabilizing renal function. 

2. Chronic renal failure - this is secondary to a possible renovascular disease.

Renal function has improved over time.  She is currently at stage 4 chronic renal

failure.  Continue supportive care. 

3. Anemia - borderline.  Continue to observe.

4. Labile hypertension, stable.  Continuing current blood pressure medications.

5. Right parotitis.  Continue supportive care.  Possible initiation of antibiotics.

We will hold imaging for the moment. 

6. Agree with current management.







Job ID:  [id number]

## 2020-01-01 NOTE — PDOC.HOSPP
- Subjective


Subjective: 





Seen and examined on medical unit with telemetry. Patient tells me she is 

having right-sided swelling and points to her parotid gland. Parotid gland is 

mildly and large to and she tells me that she has been pushing on and massaging 

all night. I recommended that she not manipulate her parotid gland in this is 

likely worsening condition. Patient likely to acute viral syndrome, it is 

unlikely that malignancy has an large so rapidly for this patient. If neck mass 

persists over the upcoming weeks the next step would be a CT angiography of the 

head and neck with contrast, however unfortunately patient's renal function 

will not allow that. I discussed the case with Dr. Gonzales who also agrees that 

this is likely a reactive type process and should be monitored. All questions 

answered in detail. Patient happy with plan of care.





- Objective


Vital Signs & Weight: 


 Vital Signs (12 hours)











  Temp Pulse Pulse Pulse Resp BP BP


 


 01/01/20 14:16   88    16  


 


 01/01/20 11:56  98.9 F  89    20  


 


 01/01/20 10:12   80    16  


 


 01/01/20 09:37    93  86   193/80 H  167/70 H


 


 01/01/20 08:40  97.5 F L  88    22 H  


 


 01/01/20 06:57   84    16  














  BP Pulse Ox Pulse Ox Pulse Ox


 


 01/01/20 14:16    


 


 01/01/20 11:56  137/60  94 L  


 


 01/01/20 10:12    


 


 01/01/20 09:37    95  93 L


 


 01/01/20 08:40  167/92 H  94 L  


 


 01/01/20 06:57    








 Weight











Admit Weight                   243 lb


 


Weight                         249 lb 9.6 oz














I&O: 


 











 12/31/19 01/01/20 01/02/20





 06:59 06:59 06:59


 


Intake Total 2720 860 


 


Output Total  350 


 


Balance 2720 510 











Result Diagrams: 


 01/01/20 04:41





 01/01/20 04:41


Radiology Reviewed by me: Yes





Hospitalist ROS





- Review of Systems


All other systems reviewed; all pertinent +/- noted in HPI/Subj





- Medication


Medications: 


Active Medications











Generic Name Dose Route Start Last Admin





  Trade Name Freq  PRN Reason Stop Dose Admin


 


Acetaminophen  650 mg  12/21/19 16:05  01/01/20 08:53





  Tylenol  PO   650 mg





  Q4H PRN   Administration





  Headache/Fever/Mild Pain (1-3)   





     





     





     


 


Albuterol/Ipratropium  3 ml  12/21/19 22:30  01/01/20 14:16





  Duoneb  EZPAP   3 ml





  H1QK-SB CORRINE   Administration





     





     





     





     


 


Allopurinol  150 mg  12/29/19 09:00  01/01/20 08:51





  Zyloprim  PO   150 mg





  DAILY CORRINE   Administration





     





     





     





     


 


Atorvastatin Calcium  10 mg  12/21/19 21:00  12/31/19 21:22





  Lipitor  PO   10 mg





  HS CORRINE   Administration





     





     





     





     


 


Benzonatate  200 mg  12/23/19 12:34  12/24/19 18:39





  Tessalon  PO   200 mg





  TIDPRN PRN   Administration





  Cough   





     





     





     


 


Carvedilol  25 mg  12/21/19 21:00  01/01/20 08:51





  Coreg  PO   25 mg





  BID CORRINE   Administration





     





     





     





     


 


Clonidine  0.3 mg  12/26/19 09:00  12/26/19 12:16





  Catapres-Tts-3  TD   0.3 mg





  Q7DAYS CORRINE   Administration





     





     





     





     


 


Dipyridamole/Aspirin  1 cap  12/21/19 21:00  01/01/20 08:51





  Aggrenox  PO   1 cap





  BID CORRNIE   Administration





     





     





     





     


 


Duloxetine HCl  60 mg  12/22/19 09:00  01/01/20 08:51





  Cymbalta  PO   60 mg





  DAILY CORRINE   Administration





     





     





     





     


 


Famotidine  20 mg  12/21/19 21:00  12/31/19 21:21





  Pepcid  PO   20 mg





  QPM CORRINE   Administration





     





     





     





     


 


Gabapentin  300 mg  12/21/19 21:00  12/31/19 21:21





  Neurontin  PO   300 mg





  HS CORRINE   Administration





     





     





     





     


 


Guaifenesin  600 mg  12/23/19 21:00  01/01/20 08:53





  Mucinex  PO   600 mg





  BID CORRINE   Administration





     





     





     





     


 


Hydralazine HCl  100 mg  01/01/20 09:00  01/01/20 15:47





  Apresoline  PO   100 mg





  TID CORRINE   Administration





     





     





     





     


 


Isosorbide Mononitrate  120 mg  12/26/19 21:00  01/01/20 08:53





  Imdur  PO   120 mg





  BID CORRINE   Administration





     





     





     





     


 


Mometasone Furoate/Formoterol Fumar  2 puff  12/22/19 06:30  01/01/20 06:57





  Dulera 100 Mcg/5 Mcg Inhaler  INH   2 puff





  BID-RT CORRINE   Administration





     





     





     





     


 


Prednisone  40 mg  12/26/19 08:00  01/01/20 08:50





  Prednisone  PO   40 mg





  QAM-WM CORRINE   Administration





     





     





     





     














- Exam


General Appearance: NAD, awake alert


Eye: anicteric sclera


ENT: normocephalic atraumatic, moist mucosa


Neck: supple, symmetric, no lymphadenopathy


Heart: no murmur, no gallops, no rubs


Respiratory: no ronchi, normal chest expansion, no tachypnea, wheezes (few 

scattered)


Gastrointestinal: soft, non-tender, non-distended, no guarding, no rigidity


Extremities: 1+ LE edema


Skin: no lesions, no rashes


Neurological: cranial nerve grossly intact, no focal deficits


Musculoskeletal: generalized weakness


Psychiatric: normal affect, normal behavior, A&O x 3





Hosp A/P


(1) COPD with exacerbation


Code(s): J44.1 - CHRONIC OBSTRUCTIVE PULMONARY DISEASE W (ACUTE) EXACERBATION   

Status: Acute   





(2) Diastolic dysfunction


Code(s): I51.89 - OTHER ILL-DEFINED HEART DISEASES   Status: Acute   





(3) Elevated troponin


Code(s): R79.89 - OTHER SPECIFIED ABNORMAL FINDINGS OF BLOOD CHEMISTRY   Status

: Acute   





(4) Gout


Code(s): M10.9 - GOUT, UNSPECIFIED   Status: Acute   





(5) Moderate mitral regurgitation


Code(s): I34.0 - NONRHEUMATIC MITRAL (VALVE) INSUFFICIENCY   Status: Acute   





(6) Morbid obesity


Code(s): E66.01 - MORBID (SEVERE) OBESITY DUE TO EXCESS CALORIES   Status: 

Acute   





(7) ISIS on CPAP


Code(s): G47.33 - OBSTRUCTIVE SLEEP APNEA (ADULT) (PEDIATRIC); Z99.89 - 

DEPENDENCE ON OTHER ENABLING MACHINES AND DEVICES   Status: Acute   





(8) Paroxysmal atrial fibrillation


Code(s): I48.0 - PAROXYSMAL ATRIAL FIBRILLATION   Status: Acute   





(9) Proteinuria


Code(s): R80.9 - PROTEINURIA, UNSPECIFIED   Status: Acute   





(10) RSV (respiratory syncytial virus infection)


Code(s): B97.4 - RESPIRATORY SYNCYTIAL VIRUS CAUSING DISEASES CLASSD ELSWHR   

Status: Acute   





(11) Lower extremity edema


Code(s): R60.0 - LOCALIZED EDEMA   Status: Acute   





(12) Physical deconditioning


Code(s): R53.81 - OTHER MALAISE   Status: Acute   





(13) Pre-syncope


Status: Acute   





(14) CKD (chronic kidney disease) stage 3, GFR 30-59 ml/min


Code(s): N18.3 - CHRONIC KIDNEY DISEASE, STAGE 3 (MODERATE)   Status: Chronic   





(15) Dyslipidemia


Code(s): E78.5 - HYPERLIPIDEMIA, UNSPECIFIED   Status: Chronic   





(16) Falls


Code(s): W19.XXXA - UNSPECIFIED FALL, INITIAL ENCOUNTER   Status: Chronic   


Qualifiers: 


   Encounter type: subsequent encounter   Qualified Code(s): W19.XXXD - 

Unspecified fall, subsequent encounter   





(17) HTN (hypertension)


Code(s): I10 - ESSENTIAL (PRIMARY) HYPERTENSION   Status: Chronic   


Qualifiers: 


   Hypertension type: essential hypertension   Qualified Code(s): I10 - 

Essential (primary) hypertension   





(18) Hypertension


Code(s): I10 - ESSENTIAL (PRIMARY) HYPERTENSION   Status: Chronic   





(19) Obesity


Code(s): E66.9 - OBESITY, UNSPECIFIED   Status: Chronic   


Qualifiers: 


   Obesity classification: adult class 3 (BMI >= 40)   Body mass index: BMI 40.0

-44.9 





(20) Acute kidney injury


Code(s): N17.9 - ACUTE KIDNEY FAILURE, UNSPECIFIED   Status: Resolved   





(21) Constipation


Code(s): K59.00 - CONSTIPATION, UNSPECIFIED   Status: Resolved   





(22) Hypertensive urgency, malignant


Code(s): I16.0 - HYPERTENSIVE URGENCY   Status: Resolved   





(23) Metabolic encephalopathy


Code(s): G93.41 - METABOLIC ENCEPHALOPATHY   Status: Resolved   





- Plan





Plan:


medical unit with telemetry


nephrology consultation, recommendations appreciated


patient with acute viral syndrome with RSV, she is currently on steroids and 

has leukocytosis


acute kidney injury on chronic kidney disease, improving with maximal medical 

therapy per nephrologist


patient with enlarged salivary gland on the right side of the neck acutely, 

this is likely secondary to acute viral process


symptomatic therapy for acute viral processes swelling to the neck


if swelling to the neck persists over the upcoming weeks to months and elective 

CT angiography of the head and neck would be the next step however it is very 

unlikely that a malignancy arose so rapidly on the right side of the patient's 

neck


blood pressure not controlled, adjust medications as appropriate by nephrology


blood pressure control


blood sugar control


Replace electrolytes as needed


G.I. prophylaxis


DVT prophylaxis

## 2020-01-02 LAB
ALBUMIN SERPL BCG-MCNC: 2.6 G/DL (ref 3.4–4.8)
ALP SERPL-CCNC: 71 U/L (ref 40–110)
ALT SERPL W P-5'-P-CCNC: 10 U/L (ref 8–55)
ANALYZER IN CARDIO: (no result)
ANALYZER IN CARDIO: (no result)
ANION GAP SERPL CALC-SCNC: 13 MMOL/L (ref 10–20)
ANION GAP SERPL CALC-SCNC: 14 MMOL/L (ref 10–20)
AST SERPL-CCNC: 16 U/L (ref 5–34)
BASE EXCESS STD BLDA CALC-SCNC: -0.7 MEQ/L
BASE EXCESS STD BLDA CALC-SCNC: 0.6 MEQ/L
BILIRUB SERPL-MCNC: 0.9 MG/DL (ref 0.2–1.2)
BUN SERPL-MCNC: 88 MG/DL (ref 9.8–20.1)
BUN SERPL-MCNC: 90 MG/DL (ref 9.8–20.1)
CA-I BLDA-SCNC: 1.28 MMOL/L (ref 1.12–1.3)
CA-I BLDA-SCNC: 1.3 MMOL/L (ref 1.12–1.3)
CALCIUM SERPL-MCNC: 9 MG/DL (ref 7.8–10.44)
CALCIUM SERPL-MCNC: 9.5 MG/DL (ref 7.8–10.44)
CHLORIDE SERPL-SCNC: 107 MMOL/L (ref 98–107)
CHLORIDE SERPL-SCNC: 108 MMOL/L (ref 98–107)
CO2 SERPL-SCNC: 21 MMOL/L (ref 23–31)
CO2 SERPL-SCNC: 22 MMOL/L (ref 23–31)
CREAT CL PREDICTED SERPL C-G-VRATE: 33 ML/MIN (ref 70–130)
CREAT CL PREDICTED SERPL C-G-VRATE: 35 ML/MIN (ref 70–130)
GLOBULIN SER CALC-MCNC: 2.8 G/DL (ref 2.4–3.5)
GLUCOSE SERPL-MCNC: 177 MG/DL (ref 83–110)
GLUCOSE SERPL-MCNC: 182 MG/DL (ref 83–110)
HCO3 BLDA-SCNC: 24.1 MEQ/L (ref 22–28)
HCO3 BLDA-SCNC: 25 MEQ/L (ref 22–28)
HCT VFR BLDA CALC: 33 % (ref 36–47)
HCT VFR BLDA CALC: 33 % (ref 36–47)
HGB BLD-MCNC: 11 G/DL (ref 12–16)
HGB BLD-MCNC: 11.2 G/DL (ref 12–16)
HGB BLDA-MCNC: 11.3 G/DL (ref 12–16)
HGB BLDA-MCNC: 11.3 G/DL (ref 12–16)
MCH RBC QN AUTO: 32.4 PG (ref 27–31)
MCH RBC QN AUTO: 33.6 PG (ref 27–31)
MCV RBC AUTO: 102 FL (ref 78–98)
MCV RBC AUTO: 103 FL (ref 78–98)
MDIFF COMPLETE?: YES
MDIFF COMPLETE?: YES
O2 A-A PPRESDIFF RESPIRATORY: 190.82 MM[HG] (ref 0–20)
PCO2 BLDA: 39.2 MMHG (ref 35–45)
PCO2 BLDA: 40.3 MMHG (ref 35–45)
PH BLDA: 7.4 [PH] (ref 7.35–7.45)
PH BLDA: 7.42 [PH] (ref 7.35–7.45)
PLATELET # BLD AUTO: 188 THOU/UL (ref 130–400)
PLATELET # BLD AUTO: 203 THOU/UL (ref 130–400)
PO2 BLDA: 115.3 MMHG (ref 60–?)
PO2 BLDA: 58.7 MMHG (ref 60–?)
POLYCHROMASIA BLD QL SMEAR: (no result) (100X)
POTASSIUM BLD-SCNC: 5.32 MMOL/L (ref 3.7–5.3)
POTASSIUM BLD-SCNC: 5.69 MMOL/L (ref 3.7–5.3)
POTASSIUM SERPL-SCNC: 5.6 MMOL/L (ref 3.5–5.1)
POTASSIUM SERPL-SCNC: 6.2 MMOL/L (ref 3.5–5.1)
RBC # BLD AUTO: 3.35 MILL/UL (ref 4.2–5.4)
RBC # BLD AUTO: 3.39 MILL/UL (ref 4.2–5.4)
SCHISTOCYTES BLD QL SMEAR: (no result) (100X)
SCHISTOCYTES BLD QL SMEAR: (no result) (100X)
SODIUM SERPL-SCNC: 136 MMOL/L (ref 136–145)
SODIUM SERPL-SCNC: 137 MMOL/L (ref 136–145)
SPECIMEN DRAWN FROM PATIENT: (no result)
TARGETS BLD QL SMEAR: (no result) (100X)
TARGETS BLD QL SMEAR: (no result) (100X)
TROPONIN I SERPL DL<=0.01 NG/ML-MCNC: 0.3 NG/ML (ref ?–0.03)
WBC # BLD AUTO: 41.5 THOU/UL (ref 4.8–10.8)
WBC # BLD AUTO: 50.3 THOU/UL (ref 4.8–10.8)

## 2020-01-02 PROCEDURE — 0BH17EZ INSERTION OF ENDOTRACHEAL AIRWAY INTO TRACHEA, VIA NATURAL OR ARTIFICIAL OPENING: ICD-10-PCS | Performed by: INTERNAL MEDICINE

## 2020-01-02 PROCEDURE — 05H633Z INSERTION OF INFUSION DEVICE INTO LEFT SUBCLAVIAN VEIN, PERCUTANEOUS APPROACH: ICD-10-PCS | Performed by: INTERNAL MEDICINE

## 2020-01-02 PROCEDURE — 5A1955Z RESPIRATORY VENTILATION, GREATER THAN 96 CONSECUTIVE HOURS: ICD-10-PCS | Performed by: INTERNAL MEDICINE

## 2020-01-02 RX ADMIN — ASPIRIN AND EXTENDED-RELEASE DIPYRIDAMOLE SCH: 25; 200 CAPSULE ORAL at 10:30

## 2020-01-02 RX ADMIN — MOMETASONE FUROATE AND FORMOTEROL FUMARATE DIHYDRATE SCH PUFF: 100; 5 AEROSOL RESPIRATORY (INHALATION) at 18:30

## 2020-01-02 RX ADMIN — CLONIDINE SCH: 0.3 PATCH TRANSDERMAL at 10:30

## 2020-01-02 RX ADMIN — FENTANYL CITRATE SCH MLS: 50 INJECTION, SOLUTION INTRAMUSCULAR; INTRAVENOUS at 21:41

## 2020-01-02 RX ADMIN — CLINDAMYCIN PHOSPHATE SCH MLS: 600 INJECTION, SOLUTION INTRAVENOUS at 14:42

## 2020-01-02 RX ADMIN — CLINDAMYCIN PHOSPHATE SCH MLS: 600 INJECTION, SOLUTION INTRAVENOUS at 22:21

## 2020-01-02 RX ADMIN — ASPIRIN AND EXTENDED-RELEASE DIPYRIDAMOLE SCH CAP: 25; 200 CAPSULE ORAL at 21:32

## 2020-01-02 RX ADMIN — MOMETASONE FUROATE AND FORMOTEROL FUMARATE DIHYDRATE SCH PUFF: 100; 5 AEROSOL RESPIRATORY (INHALATION) at 06:40

## 2020-01-02 NOTE — OP
DATE OF PROCEDURE:  01/02/2020



PROCEDURE PERFORMED:  Endotracheal intubation.



PREOPERATIVE DIAGNOSIS:  Compromised airway from facial swelling.



POSTOPERATIVE DIAGNOSIS:  Successful endotracheal intubation.



DESCRIPTION OF PROCEDURE:  This was done as an awake intubation.  The patient

received Hurricaine spray to the back of her throat.  Using a 2.2 Ambu bronchoscope,

I intubated the patient orally with a 7.5 endotracheal tube.  There were some

difficulty accomplishing this because the patient's oropharynx was full of pus,

which had to be extracted at a time.  However, the intubation was accomplished on

the first attempt.  The orville was visualized.  There were copious secretions seen

in both airways, which were lavaged with normal saline and aspirated once the

patient has been placed on mechanical ventilation.  The specimens were sent for

culture.  The patient tolerated that procedure well. 







Job ID:  [id number]

## 2020-01-02 NOTE — PDOC.HOSPP
- Subjective


Subjective: 





Seen and examined. Patient had a very eventful morning. I saw her early this 

a.m. and she was talking in full sentences, moving all extremity's. No focal 

neurologic deficits and a explicitly tested the left side and there were no 

deficits. Roughly an hour later my was paid by nursing staff stating the 

patient has become less aware and is no longer moving the left arm. Code green 

called. Decision is made to put patient in the intensive care unit.





Patient went for CT scan of the brain which was negative for acute intracranial 

process however there is large right-sided enlargement of the parotid gland. 

ENT consultation requested and antibiotics were initiated.





Discuss case with ENT physician who expressed purulent drainage and cultured 

it. ENT physician recommending escalation of antibiotics with coverage for MRSA

, specifically with vancomycin until culture data shows that methicillin 

sensitive Staphylococcus aureus or another organism is present. 





Patient is breathing comfortably on low-flow nasal cannula and oxygenating, 

though she did not take her oral medications from this morning and now her 

blood pressure is controlled. NG tube to be placed for continuity of oral 

medications. IV titratable drip started for blood pressure control.





Will consult palliative care for goals of care.





- Objective


Vital Signs & Weight: 


 Vital Signs (12 hours)











  Temp Pulse Resp BP Pulse Ox


 


 01/02/20 11:27   90  18   99


 


 01/02/20 11:00  98.9 F    


 


 01/02/20 10:00      94 L


 


 01/02/20 09:01  98.7 F  94  17  135/60  96


 


 01/02/20 06:39   90  18   96


 


 01/02/20 05:30  97.1 F L  99  22 H  179/79 H  97


 


 01/02/20 02:22   95  16   96








 Weight











Admit Weight                   243 lb


 


Weight                         247 lb 9.6 oz














I&O: 


 











 01/01/20 01/02/20 01/03/20





 06:59 06:59 06:59


 


Intake Total 860 2240 


 


Output Total 350 600 215


 


Balance 510 1640 -215











Result Diagrams: 


 01/02/20 09:50





 01/02/20 09:50


Additional Labs: 


 Accuchecks











  01/02/20





  09:47


 


POC Glucose  181 H











Radiology Reviewed by me: Yes





Hospitalist ROS





- Review of Systems


All other systems reviewed; all pertinent +/- noted in HPI/Subj





- Medication


Medications: 


Active Medications











Generic Name Dose Route Start Last Admin





  Trade Name Freq  PRN Reason Stop Dose Admin


 


Acetaminophen  650 mg  12/21/19 16:05  01/01/20 08:53





  Tylenol  PO   650 mg





  Q4H PRN   Administration





  Headache/Fever/Mild Pain (1-3)   





     





     





     


 


Albuterol/Ipratropium  3 ml  12/21/19 22:30  01/02/20 11:27





  Duoneb  EZPAP   3 ml





  A1HH-NC CORRINE   Administration





     





     





     





     


 


Allopurinol  150 mg  12/29/19 09:00  01/02/20 10:30





  Zyloprim  PO   Not Given





  DAILY [facility]   





     





     





     





     


 


Atorvastatin Calcium  10 mg  12/21/19 21:00  01/01/20 21:54





  Lipitor  PO   10 mg





  HS CORRINE   Administration





     





     





     





     


 


Benzonatate  200 mg  12/23/19 12:34  01/01/20 22:19





  Tessalon  PO   200 mg





  TIDPRN PRN   Administration





  Cough   





     





     





     


 


Carvedilol  25 mg  12/21/19 21:00  01/02/20 10:30





  Coreg  PO   Not Given





  BID [facility]   





     





     





     





     


 


Clonidine  0.3 mg  12/26/19 09:00  01/02/20 10:30





  Catapres-Tts-3  TD   Not Given





  Q7DAYS [facility]   





     





     





     





     


 


Dipyridamole/Aspirin  1 cap  12/21/19 21:00  01/02/20 10:30





  Aggrenox  PO   Not Given





  BID [facility]   





     





     





     





     


 


Duloxetine HCl  60 mg  12/22/19 09:00  01/02/20 10:30





  Cymbalta  PO   Not Given





  DAILY [facility]   





     





     





     





     


 


Famotidine  20 mg  12/21/19 21:00  01/01/20 21:54





  Pepcid  PO   20 mg





  QPM CORRINE   Administration





     





     





     





     


 


Gabapentin  300 mg  12/21/19 21:00  01/01/20 21:54





  Neurontin  PO   300 mg





  HS [facility]   Administration





     





     





     





     


 


Guaifenesin  600 mg  12/23/19 21:00  01/02/20 10:30





  Mucinex  PO   Not Given





  BID [facility]   





     





     





     





     


 


Hydralazine HCl  100 mg  01/01/20 21:00  01/02/20 10:31





  Apresoline  PO   Not Given





  TID [facility]   





     





     





     





     


 


Isosorbide Mononitrate  120 mg  12/26/19 21:00  01/02/20 10:31





  Imdur  PO   Not Given





  BID [facility]   





     





     





     





     


 


Methylprednisolone Sodium Succinate  20 mg  01/02/20 12:00  01/02/20 11:35





  Solu-Medrol  IVP   20 mg





  Q6HR CORRINE   Administration





     





     





     





     


 


Mometasone Furoate/Formoterol Fumar  2 puff  12/22/19 06:30  01/02/20 06:40





  Dulera 100 Mcg/5 Mcg Inhaler  INH   2 puff





  BID-RT CORRINE   Administration





     





     





     





     














- Exam


General Appearance: ill appearing


Eye: PERRL, anicteric sclera


ENT: normocephalic atraumatic, moist mucosa


Neck: supple, symmetric, no lymphadenopathy


Heart: no murmur, no gallops, no rubs


Respiratory: CTAB, no wheezes, no rales, no ronchi, normal chest expansion


Gastrointestinal: soft, non-tender, no guarding, no rigidity


Extremities: 2+ LE edema


Skin: no lesions, no rashes


Neurological: cranial nerve grossly intact, no focal deficits (Not moving the 

left when sleepy)


Musculoskeletal: generalized weakness


Psychiatric: oriented to person, somnolent.  negative: oriented to place, 

oriented to time





Hosp A/P


(1) COPD with exacerbation


Code(s): J44.1 - CHRONIC OBSTRUCTIVE PULMONARY DISEASE W (ACUTE) EXACERBATION   

Status: Acute   





(2) Diastolic dysfunction


Code(s): I51.89 - OTHER ILL-DEFINED HEART DISEASES   Status: Acute   





(3) Elevated troponin


Code(s): R79.89 - OTHER SPECIFIED ABNORMAL FINDINGS OF BLOOD CHEMISTRY   Status

: Acute   





(4) Gout


Code(s): M10.9 - GOUT, UNSPECIFIED   Status: Acute   





(5) Moderate mitral regurgitation


Code(s): I34.0 - NONRHEUMATIC MITRAL (VALVE) INSUFFICIENCY   Status: Acute   





(6) Morbid obesity


Code(s): E66.01 - MORBID (SEVERE) OBESITY DUE TO EXCESS CALORIES   Status: 

Acute   





(7) ISIS on CPAP


Code(s): G47.33 - OBSTRUCTIVE SLEEP APNEA (ADULT) (PEDIATRIC); Z99.89 - 

DEPENDENCE ON OTHER ENABLING MACHINES AND DEVICES   Status: Acute   





(8) Paroxysmal atrial fibrillation


Code(s): I48.0 - PAROXYSMAL ATRIAL FIBRILLATION   Status: Acute   





(9) Proteinuria


Code(s): R80.9 - PROTEINURIA, UNSPECIFIED   Status: Acute   





(10) RSV (respiratory syncytial virus infection)


Code(s): B97.4 - RESPIRATORY SYNCYTIAL VIRUS CAUSING DISEASES CLASSD ELSWHR   

Status: Acute   





(11) Lower extremity edema


Code(s): R60.0 - LOCALIZED EDEMA   Status: Acute   





(12) Physical deconditioning


Code(s): R53.81 - OTHER MALAISE   Status: Acute   





(13) Pre-syncope


Status: Acute   





(14) CKD (chronic kidney disease) stage 3, GFR 30-59 ml/min


Code(s): N18.3 - CHRONIC KIDNEY DISEASE, STAGE 3 (MODERATE)   Status: Chronic   





(15) Dyslipidemia


Code(s): E78.5 - HYPERLIPIDEMIA, UNSPECIFIED   Status: Chronic   





(16) Falls


Code(s): W19.XXXA - UNSPECIFIED FALL, INITIAL ENCOUNTER   Status: Chronic   


Qualifiers: 


   Encounter type: subsequent encounter   Qualified Code(s): W19.XXXD - 

Unspecified fall, subsequent encounter   





(17) HTN (hypertension)


Code(s): I10 - ESSENTIAL (PRIMARY) HYPERTENSION   Status: Chronic   


Qualifiers: 


   Hypertension type: essential hypertension   Qualified Code(s): I10 - 

Essential (primary) hypertension   





(18) Hypertension


Code(s): I10 - ESSENTIAL (PRIMARY) HYPERTENSION   Status: Chronic   





(19) Obesity


Code(s): E66.9 - OBESITY, UNSPECIFIED   Status: Chronic   


Qualifiers: 


   Obesity classification: adult class 3 (BMI >= 40)   Body mass index: BMI 40.0

-44.9 





(20) Acute kidney injury


Code(s): N17.9 - ACUTE KIDNEY FAILURE, UNSPECIFIED   Status: Resolved   





(21) Constipation


Code(s): K59.00 - CONSTIPATION, UNSPECIFIED   Status: Resolved   





(22) Hypertensive urgency, malignant


Code(s): I16.0 - HYPERTENSIVE URGENCY   Status: Resolved   





(23) Metabolic encephalopathy


Code(s): G93.41 - METABOLIC ENCEPHALOPATHY   Status: Resolved   





- Plan





Plan:


Upgrade to ICU


ENT consultation, recommendations appreciated


Pulm/ CC consultation, recommendations appreciated


nephrology consultation, recommendations appreciated


Palliative care consultation, recommendations appreciated


Escalate ABX with Vancomycin with coverage for MRSA


Clindamycin for anaerobes and gram neg coverage


De escalate to culture and sensitivity as able


Right sided neck swelling diagnosed as Parotitis, with expressed purulent 

discharge, culture sent


RSV


acute kidney injury on chronic kidney disease, improving with maximal medical 

therapy per nephrologist


blood pressure not controlled, adjust medications as appropriate by nephrology


blood pressure control


blood sugar control


Replace electrolytes as needed


G.I. prophylaxis


DVT prophylaxis





Dispostion: Goal of care to be addressed with family. Long term prognosis 

guarded

## 2020-01-02 NOTE — RAD
XR Chest 1 View



HISTORY: Shortness of breath



COMPARISON: 12/21/2019



FINDINGS: The heart size is at upper limits of normal. The aorta is tortuous. The lungs are well expa
nded without focal areas of consolidation, pneumothorax or pleural effusions.



IMPRESSION: No radiographic evidence of acute cardiopulmonary process.



Reported By: Jorge Fuentes 

Electronically Signed:  1/2/2020 10:43 AM

## 2020-01-02 NOTE — CT
CT Brain WO Con



HISTORY: Left-sided weakness.



COMPARISON: 11/10/2018 exam.



FINDINGS: The ventricular and cisternal system is within normal limits. There are no signs of intrace
rebral hemorrhage or extra-axial fluid collections. The mastoid air cells are clear. There is

extensive sinus disease with sphenoid ethmoid and maxillary sinus disease seen on the right with air-
fluid levels.



There is soft tissue swelling which appears to represent a right sided scalp hematoma with edema chacko
ge or blood extending into the neck and face region.



IMPRESSION: No acute intracranial abnormalities.



Sinus disease with air-fluid levels within the right maxillary and sphenoid air cells.



Soft tissue swelling the right scalp and extending inferiorly over the right face and neck region pro
bably related to hematoma.



Findings telephoned to Dr. Vera at 1020 hours.



Reported By: Dutch Denis 

Electronically Signed:  1/2/2020 10:26 AM

## 2020-01-02 NOTE — PRG
DATE OF SERVICE:  01/02/2020



I was called back to see the patient because she lacked IV access.  Nurses had tried

getting peripheral and midlines without success. 



It had been 4 to 5 hours since I last saw her, and when I came into the room, I was

immediately shocked by how much the right-sided facial swelling had increased and

also extended now over to the left side.  The patient was surprisingly alert and

could communicate with me.  I told her we needed to put a central line in, which was

accomplished.  See separate operative note. 



After IV access was obtained, I felt it necessary to intubate the patient because of

potential airway compromise.  I discussed this beforehand with the patient's

daughter, Daphne, who agreed to the procedure. 



After she was intubated, I performed bronchoscopy to extract extensive mucoid

secretions from the lung.  I then placed her on mechanical ventilation.  Discussed

the case with Dr. Ravi from ENT and used face time to communicate __________ the

patient's condition.  He thought this was probably bilateral parotitis.  The Gram

stain from the initial specimen obtained earlier today shows gram-positive cocci

cultures indicating this is probably a staph infection.  The patient is receiving

vancomycin, Flagyl, Cleocin, and cefepime.  Her prognosis is guarded.  The family

has been updated. 







Job ID:  [id number]

## 2020-01-02 NOTE — OP
DATE OF PROCEDURE:  01/02/2020



PROCEDURE PERFORMED:  Central line placement.



PREOPERATIVE DIAGNOSIS:  Poor IV access.



POSTOPERATIVE DIAGNOSIS:  Successful left subclavian central line placement.



ANESTHESIA:  1% lidocaine without epinephrine.



DESCRIPTION OF PROCEDURE:  Informed consent was obtained from the patient's

daughter, Daphne.  The patient was placed in the Trendelenburg position.  The left

clavicular area was scrubbed with chlorhexidine, draped sterilely.  On first

attempt, the subclavian vein was cannulated and triple-lumen catheter was placed via

modified Seldinger technique.  Three ports flushed of venous blood in the line was

sutured into position. 







Job ID:  [id number]

## 2020-01-02 NOTE — RAD
EXAM:

XR Chest 1 View Portable



PROVIDED CLINICAL HISTORY:

Respiratory insufficiency



COMPARISON:

1/2/2020



FINDINGS:

Interval placement of endotracheal tube, the tip of which projects proximal to the orville. Interval p
lacement of left subclavian central line, tip of which overlies the midline presumably in the left

brachiocephalic vein. Interval placement of enteric catheter, the distal aspects of which are not wel
l visualized due to overlying material but likely below the diaphragm. Patchy airspace disease at

the left lung base is noted. The supine nature of the examination is not sensitive for detection of p
leural fluid or pneumothorax.



IMPRESSION:



1. ET, central line and enteric catheter placement as above.

2. Left lower lung zone airspace disease.



Reported By: Neville Souza 

Electronically Signed:  1/2/2020 9:05 PM

## 2020-01-02 NOTE — PRG
DATE OF SERVICE:  01/02/2020



SUBJECTIVE:  Apparently, a Code Green was called earlier this morning on this

patient as she was lethargic at the time.  She has some right-sided facial swelling,

that is new.  Somebody told me that she might have fallen last night.  She had a

head CT done this morning, which did not show evidence of a stroke, but she does

have extensive right-sided facial swelling extending from her ear down to her neck.

Radiologist thinks this is probably hematoma. 



OBJECTIVE:  VITAL SIGNS:  Her temperature is 98.7, pulse 88, blood pressure 170/74,

and O2 sat 93%. 

HEENT:  Remarkable for the hematoma along the right side of her face.  Oropharynx is

clear. 

NECK:  No adenopathy or JVD. 

LUNGS:  Clear anteriorly. 

CARDIAC:  S1 and S2, regular. 

ABDOMEN:  Soft. 

EXTREMITIES:  She moves her right and left arms appropriately.  She seems less apt

to move her left leg.  Her right leg looks fine. 



LABORATORY DATA:  White blood cell count 50, hematocrit 34.5, and platelet count

203.  D-dimer is 4.1.  PH of 7.42, pCO2 of 39, and pO2 of 58.  Sodium 137, potassium

5.6, chloride 108, CO2 of 21, BUN 90, creatinine 2.2, and glucose 177.  Head CT was

reviewed.  Chest x-ray shows no mass, effusion or infiltrate. 



ASSESSMENT:  

1. Right-sided facial hematoma.  __________ hematoma, I am somewhat concerned that

she may have postconcussive syndrome. 

2. Elevated white blood cell count with elevated D-dimer, which I think is probably

from the fall and hematoma. 



PLAN:  

1. She has been placed on clindamycin by the primary team.  I will go ahead and

start her back on IV steroids since she was taking prednisone and may not be able to 

take it at this time.

2. She needs to be observed in the ICU.  We will put cool compresses on her face to

see if we can reduce the swelling. 

3. I spoke with the family at bedside.







Job ID:  [id number]

## 2020-01-02 NOTE — PRG
DATE OF SERVICE:  01/02/2020



SERVICE:  Renal Medicine.



SUBJECTIVE:  Ms. Leach is an 85-year-old black female, followed up by the Renal

Service for her acute kidney injury/chronic renal failure.  Her creatinine has been

fluctuating.  In the last few days, creatinine was noted to have worsened.  She was

given volume repletion with improvement of her creatinine.  In the interim, she has

developed right parotid swelling.  The feeling is that this may be viral in etiology

and for that reason, they are holding off any antibiotics for the moment.  No other

complaints today.  She does complain of right facial parotid swelling.  No chest

pain or shortness of breath. 



OBJECTIVE:  VITAL SIGNS:  Blood pressure 179/79, heart rate 99, respiratory rate 22,

temperature 97.1, and pulse ox 97%. 

GENERAL:  The patient is awake, alert, comfortable, not in overt distress. 

SKIN:  Adequate turgor. 

HEENT:  She has a pinkish conjunctivae.  Anicteric sclerae.  She has a right parotid

gland swelling. 

NECK:  No neck mass.  No carotid bruits.  No JVD. 

CHEST:  No deformities. 

LUNGS:  Clear breath sounds.  No wheezing.  No crackles. 

HEART:  Normal sinus rhythm.  No murmur.  No gallops.  No rubs. 

ABDOMEN:  Globular, soft, and nontender.  No masses. 

EXTREMITIES:  No edema.  No deformities.



MEDICATIONS:  Medications of January 2, 2020, was reviewed.



LABORATORY DATA:  Laboratories of January 2, 2020; white count 41.5, hemoglobin

11.2.  Sodium 136, potassium 6.2, chloride 107, carbon dioxide 22, BUN 88,

creatinine 2.09, glucose 182, and calcium 9.0. 



ASSESSMENT AND PLAN:  

1. Chronic renal failure/acute kidney injury.  Stable renal function.  Creatinine of

2.09, is slightly higher yesterday from 1.94.  She has currently near baseline GFR.

Continue supportive care.  Continue low-salt, low-protein diet.  No indication for

any dialytic intervention. 

2. Hyperkalemia.  We will give Kayexalate 30 g with lactulose 30 mL x1 dose.

3. Leukocytosis - this could be related from the prednisone dosing.  Please note,

prednisone has been discontinued. 

4. Parotid gland swelling - possibility of viral infection.  My bias if it remains

unresolved is to consider starting her on antibiotics since she does have

leukocytosis. 

5. Labile hypertension.  Continue supportive care.

6. Borderline anemia.  Continue to observe.  Recheck CBC and basic metabolic panel

in a.m. 







Job ID:  [id number]

## 2020-01-03 LAB
ANALYZER IN CARDIO: (no result)
ANION GAP SERPL CALC-SCNC: 15 MMOL/L (ref 10–20)
BASE EXCESS STD BLDA CALC-SCNC: -1.6 MEQ/L
BUN SERPL-MCNC: 91 MG/DL (ref 9.8–20.1)
CA-I BLDA-SCNC: 1.25 MMOL/L (ref 1.12–1.3)
CALCIUM SERPL-MCNC: 9.2 MG/DL (ref 7.8–10.44)
CHLORIDE SERPL-SCNC: 107 MMOL/L (ref 98–107)
CO2 SERPL-SCNC: 23 MMOL/L (ref 23–31)
COMM CRITICAL RESULTS DOC: (no result)
CREAT CL PREDICTED SERPL C-G-VRATE: 32 ML/MIN (ref 70–130)
GLUCOSE SERPL-MCNC: 131 MG/DL (ref 83–110)
HCO3 BLDA-SCNC: 23.4 MEQ/L (ref 22–28)
HCT VFR BLDA CALC: 28 % (ref 36–47)
HGB BLD-MCNC: 9.9 G/DL (ref 12–16)
HGB BLDA-MCNC: 9.4 G/DL (ref 12–16)
MCH RBC QN AUTO: 32.7 PG (ref 27–31)
MCV RBC AUTO: 102 FL (ref 78–98)
MDIFF COMPLETE?: YES
O2 A-A PPRESDIFF RESPIRATORY: 122.38 MM[HG] (ref 0–20)
PCO2 BLDA: 40.3 MMHG (ref 35–45)
PH BLDA: 7.38 [PH] (ref 7.35–7.45)
PLATELET # BLD AUTO: 145 THOU/UL (ref 130–400)
PO2 BLDA: 76.8 MMHG (ref 60–?)
POTASSIUM BLD-SCNC: 5.11 MMOL/L (ref 3.7–5.3)
POTASSIUM SERPL-SCNC: 5.4 MMOL/L (ref 3.5–5.1)
RBC # BLD AUTO: 3.03 MILL/UL (ref 4.2–5.4)
SODIUM SERPL-SCNC: 140 MMOL/L (ref 136–145)
SPECIMEN DRAWN FROM PATIENT: (no result)
WBC # BLD AUTO: 49.7 THOU/UL (ref 4.8–10.8)

## 2020-01-03 RX ADMIN — METRONIDAZOLE SCH MLS: 500 INJECTION, SOLUTION INTRAVENOUS at 00:13

## 2020-01-03 RX ADMIN — FAMOTIDINE SCH MG: 10 INJECTION, SOLUTION INTRAVENOUS at 09:30

## 2020-01-03 RX ADMIN — CLINDAMYCIN PHOSPHATE SCH MLS: 600 INJECTION, SOLUTION INTRAVENOUS at 05:55

## 2020-01-03 RX ADMIN — METRONIDAZOLE SCH MLS: 500 INJECTION, SOLUTION INTRAVENOUS at 06:07

## 2020-01-03 RX ADMIN — ASPIRIN AND EXTENDED-RELEASE DIPYRIDAMOLE SCH CAP: 25; 200 CAPSULE ORAL at 21:17

## 2020-01-03 RX ADMIN — ASPIRIN AND EXTENDED-RELEASE DIPYRIDAMOLE SCH CAP: 25; 200 CAPSULE ORAL at 09:24

## 2020-01-03 NOTE — PRG
DATE OF SERVICE:  01/03/2020



TIME SPENT:  35 minutes of critical care time.



SUBJECTIVE:  The patient remains intubated on mechanical ventilation.  She will wake

up and follow commands. 



OBJECTIVE:  VITAL SIGNS:  Her temperature is 98.5, pulse 84, blood pressure 153/60,

O2 saturation 97%, and respiratory rate 16.  She is requiring no vasopressors.  A

24-hour intake 1648, output 835. 

HEENT:  She has bilateral parotid swelling with extensive lymphedema.  She is

intubated orally. 

NECK:  No JVD. 

CHEST:  Clear to auscultation anteriorly. 

CARDIOVASCULAR:  S1 and S2 regular without audible murmur. 

ABDOMEN:  Soft, obese, nontender, and nondistended. 

EXTREMITIES:  No clubbing, cyanosis, or edema.



LABORATORY DATA:  White blood cell count 49.7, hemoglobin 9.9, hematocrit 30.8, and

platelet count 145.  PH of 7.38, pCO2 of 40, pO2 of 76 on SIMV rate 12, tidal volume

500, PEEP 5, pressure support 10, and FiO2 of 35%.  Sodium 140, potassium 5.4,

chloride 107, CO2 of 23, BUN 91, creatinine 2.2, and glucose 131. 



ASSESSMENT:  

1. Bilateral parotitis.

2. Acute respiratory failure requiring mechanical ventilation.

3. Sepsis.

4. Acute renal dysfunction.

5. Anemia.



PLAN:  

1. She is continuing on multiple antibiotics including vancomycin, Cleocin, Flagyl,

and cefepime.  I will ask Dr. Machado to consult on the case to see if he has any

input. 

2. Continue mechanical ventilation until facial swelling decreases.

3. Begin enteral tube feeds.

4. Reduce steroid dose.

5. I have updated family.







Job ID:  [id number]

## 2020-01-03 NOTE — RAD
SINGLE VIEW CHEST:

 

HISTORY:

Tube placement and line placement. Intubated patient with respiratory failure.

 

COMPARISON:

01/02/2020

 

FINDINGS:

A single view of the chest shows an enlarged cardiomediastinal silhouette. The lines and tubes are un
changed in position. There appear to be small bilateral pleural effusions. Degenerative changes are s
een in the spine.

 

IMPRESSION:

Stable examination.

 

POS: [facility]

## 2020-01-03 NOTE — PRG
DATE OF SERVICE:  12/30/2019



SUBJECTIVE:  This is a very pleasant 85-year-old female with chronic kidney 
disease,

bronchitis, gout, hypertension .  I had seen the patient over the weekend

for dysphagia.  A plan was for an EGD yesterday.  The patient actually taken 
down to

the [facility] yesterday.  However, she was found to have low O2 saturation and

poor air entry to both lungs.  Anesthesia Service did not feel comfortable 
doing the

procedure. 



OBJECTIVE:  GENERAL:  The patient appears very comfortable.  She is still 
coughing

.  She is not short winded. 

VITAL SIGNS:  Very stable.  Afebrile, pulse is 95, blood pressure 150/70. 

CARDIOVASCULAR:  First and second heart sounds are heard. 

LUNGS:  Clear to auscultation. 

ABDOMEN:  Soft. non tender, no masses 



LABORATORY DATA:  CBC; WBC 11,800, hemoglobin 10.1, hematocrit 30.4 .

Chemistry panel shows BUN dropping down to 106, creatinine is 2.17.  Lytes are

normal. 



IMPRESSION:  Dysphagia, etiology unclear.



PLAN:  EGD in the near future.







Job ID:  [id number]



[facility]

## 2020-01-03 NOTE — CON
DATE OF CONSULTATION:  01/03/2020



REASON FOR CONSULTATION:  Parotitis.



HISTORY OF PRESENT ILLNESS:  An 85-year-old lady, whom I had seen in 2017, with 
a

history of recurrent UTIs, essential tremor, hypertension, sleep apnea, and 
prior

CVA, who was admitted on December 23 because of lower extremity swelling and 
wounds

in lower extremities.  The initial impression was hypertension, bronchitis, 
atrial

fibrillation, CKD, hyponatremia, and gout.  Nephrology consultation was 
initiated.

Dr. Gonzales evaluated the patient.  The patient was continued on corticosteroids and

inhalers and on the 25th, Dr. Vera evaluated the patient.  Things went well 
for

the next few days, but then suddenly, she developed a swelling and inflammatory

changes in the right parotid gland and code green was called because the patient

became lethargic and CT of the head did not show any CVA.  Concern of a 
hematoma was

mentioned.  Bronchoscopy was done.  There was extensive mucoid secretions from 
the

lungs and she had swelling of the parotid glands, particularly on the right 
side and

currently, the patient was orotracheal intubated.  T-max was 99.2, blood 
pressure

95/42, pulse 62, respirations 12, and O2 saturation 97.  The skin shows the 
shallow

areas of ulceration in the ankles, some stasis changes.  She has a left 
subclavian

central line.  No diarrhea has been reported.  No seizure activity. 



PAST MEDICAL HISTORY:  Includes obesity, sleep apnea, prior CVA, atrial 
fibrillation

paroxysmal, UTIs, degenerative joint disease, hypertension, CKD stage 3,

cardiomyopathy, and hyperlipidemia. 



ALLERGIES:  IODINE AND REPORTED ALLERGY TO PRIMIDONE AND VARICELLA ZOSTER 
VACCINE.



SOCIAL HISTORY:  Never smoker.



CURRENT MEDICATIONS:  

1. Inhalers.

2. Coreg.

3. Cefepime.

4. Catapres.

5. Aggrenox.

6. Lovenox.

7. Fentanyl.

8. Normodyne.

9. Ativan.

10. Medrol.

11. Flagyl.

12. Nicardipine.

13. Vancomycin.



PHYSICAL EXAMINATION:

VITAL SIGNS:  T-max 99.2, blood pressure 95/42, respiratory rate 12, and O2

saturation 97. 

SKIN:  Shows those shallow areas in the right leg and left leg and central line 
in

subclavian location.  Hinds catheter.  ET tube and orogastric tube.  I's and O's

have been positive from 500 to 1600.  No lymphadenopathy. 

HEENT:  Ocular movements are not testable at this time.  Pupils are constricted.

Sclerae white. 

LUNGS:  Symmetric breath sounds. 

HEART:  S1 and S2.  Regular rate. 

ABDOMEN:  Soft and not distended.  The right parotid area seems to be enlarged 
or

swollen.  The left side is much softer and I do not think it is enlarged. 

EXTREMITIES:  The patient has 1+ edema in lower extremities.  Plantar responses 
are

indifferent.  Warm extremities.  Pulses are 1+ in dorsalis pedis.  I cannot test

movements at this moment. 



LABORATORY STUDIES:  White cell count is up to 49.7, hemoglobin 9.9, platelets

145,000, and 12% bands.  A pH of 7.38, pCO2 of 40, and pO2 of 76.  Sodium 140 
and

creatinine 2.25, her baseline is around 1.6, 1.5.  Urinalysis with 11 to 20 wbc'
s.

Cultures, we have 2 sets of blood cultures thus far no growth.  Respiratory 
culture

pending and secretion from the parotid gland with Staphylococcus aureus.

Respiratory virus PCR panel was positive on December 21 for respiratory 
syncytial

virus. 



ASSESSMENT:  

1. Obesity, sleep apnea, cardiomyopathy, hypertensive chronic kidney disease 
stage 3

to 4, and admission for the leg edema and leg ulcers. 

2. Swelling of the right parotid gland, decompensation of the swallowing 
function

with change in mental status requiring intubation, evidence of aspiration and

bronchitis. 



DISCUSSION:  The decompensation may have been iatrogenic related to drying up of

salivary gland secretions and a very common complication in older women, i.e.,

suppurative parotitis or sialadenitis.  The patient has associated toxic 
metabolic

encephalopathy, which can be multifactorial in addition to the patient's other

issues.  Staphylococcus aureus, the most common organism sometimes can be 
associated

with abscesses.  Most patients improve with medical therapy alone with

secretagogues and IV antimicrobial therapy.  Typically, the duration of therapy 
is

protracted until there is improvement of inflammatory process.  Imaging studies 
are

helpful to determine the presence of abscess.  In her case, contrast would not 
be

able to be administered.  Ultrasound of the parotid gland might provide with

information regarding the presence of a fluid collection within the gland.

Discontinue Flagyl.  Continue cefepime and vancomycin until final results of

cultures. 







Job ID:  [id number]



MTDD

## 2020-01-03 NOTE — PDOC.HOSPP
- Subjective


Subjective: 





Seen and examined. Patient intubated. Family at bedside, all questions answered 

in detail. Prognosis guarded.





- Objective


Vital Signs & Weight: 


 Vital Signs (12 hours)











  Temp Pulse Resp BP Pulse Ox


 


 01/03/20 10:07   62   95/42 L 


 


 01/03/20 10:05   58 L  12   97


 


 01/03/20 10:00    12  


 


 01/03/20 09:22   74   174/63 H 


 


 01/03/20 08:00  97.9 F   12  


 


 01/03/20 06:33   74   174/63 H 


 


 01/03/20 06:31   77  12   98


 


 01/03/20 06:00    12  


 


 01/03/20 05:00  98.5 F    


 


 01/03/20 04:00    12  


 


 01/03/20 03:17      98


 


 01/03/20 03:00  98.6 F    








 Weight











Admit Weight                   243 lb


 


Weight                         247 lb 9.6 oz














I&O: 


 











 01/02/20 01/03/20 01/04/20





 06:59 06:59 06:59


 


Intake Total 2240 1648 


 


Output Total 600 835 165


 


Balance 1640 813 -165











Result Diagrams: 


 01/03/20 03:00





 01/03/20 03:00


Radiology Reviewed by me: Yes





Hospitalist ROS





- Review of Systems


ROS unobtainable: due to endotracheal tube





- Medication


Medications: 


Active Medications











Generic Name Dose Route Start Last Admin





  Trade Name Freq  PRN Reason Stop Dose Admin


 


Acetaminophen  650 mg  12/21/19 16:05  01/01/20 08:53





  Tylenol  PO   650 mg





  Q4H PRN   Administration





  Headache/Fever/Mild Pain (1-3)   





     





     





     


 


Albuterol/Ipratropium  3 ml  12/21/19 22:30  01/03/20 10:05





  Duoneb  EZPAP   3 ml





  S7AU-LO CORRINE   Administration





     





     





     





     


 


Carvedilol  25 mg  12/21/19 21:00  01/03/20 09:24





  Coreg  PO   25 mg





  BID CORRINE   Administration





     





     





     





     


 


Clonidine  0.3 mg  12/26/19 09:00  01/02/20 10:30





  Catapres-Tts-3  TD   Not Given





  Q7DAYS CORRINE   





     





     





     





     


 


Dipyridamole/Aspirin  1 cap  12/21/19 21:00  01/03/20 09:24





  Aggrenox  PO   1 cap





  BID CORRINE   Administration





     





     





     





     


 


Enoxaparin Sodium  30 mg  01/03/20 09:00  01/03/20 09:42





  Lovenox  SC   30 mg





  0900 CORRINE   Administration





     





     





     





     


 


Famotidine  20 mg  01/03/20 09:00  01/03/20 09:30





  Pepcid  SLOW IVP   20 mg





  DAILY CORRINE   Administration





     





     





     





     


 


Hydralazine HCl  100 mg  01/01/20 21:00  01/03/20 09:22





  Apresoline  PO   100 mg





  TID CORRINE   Administration





     





     





     





     


 


Hydralazine HCl  10 mg  01/02/20 13:43  01/02/20 14:42





  Apresoline  SLOW IVP   10 mg





  Q4H PRN   Administration





  Hypertension SBP>180   





     





     





     


 


Cefepime HCl 2 gm/ Sodium  100 mls @ 200 mls/hr  01/02/20 22:30  01/02/20 23:22





  Chloride  IVPB  01/08/20 22:59  100 mls





  2230 CORRINE   Administration





     





     





     





     


 


Fentanyl Citrate 2,000 mcg/  100 mls @ 0 mls/hr  01/02/20 21:13  01/02/20 21:41





  Sodium Chloride  IV  02/01/20 21:13  100 mls





  INF CORRINE   Administration





     





     





  Protocol   





  Per Protocol   


 


Lactated Ringer's  1,000 mls @ 100 mls/hr  01/02/20 21:30  01/03/20 07:58





  Lactated Ringer's  IV   1,000 mls





  .Q10H CORRINE   Administration





     





     





     





     


 


Methylprednisolone Sodium Succinate  20 mg  01/03/20 09:00  01/03/20 09:30





  Solu-Medrol  IVP   20 mg





  BID CORRINE   Administration





     





     





     





     


 


Propofol  1,000 mg  01/02/20 21:13  01/03/20 01:57





  Diprivan  IV  02/01/20 21:13  1,000 mg





  INF PRN   Administration





  TO ACHIEVE GOAL RASS   





     





  Protocol   





     














- Exam


General Appearance: ill appearing


Eye: PERRL, anicteric sclera


ENT: normocephalic atraumatic, moist mucosa


Neck: supple, symmetric, no lymphadenopathy


Heart: no murmur, no gallops, no rubs


Respiratory: no rales, normal chest expansion, no tachypnea, rhonchi, wheezes


Gastrointestinal: soft, non-tender, no guarding, no rigidity


Extremities: no clubbing, no edema


Skin: no lesions, no rashes


Neurological: cranial nerve grossly intact, no focal deficits


Musculoskeletal: generalized weakness


Psychiatric - other findings: Sedated





Hosp A/P


(1) COPD with exacerbation


Code(s): J44.1 - CHRONIC OBSTRUCTIVE PULMONARY DISEASE W (ACUTE) EXACERBATION   

Status: Acute   





(2) Diastolic dysfunction


Code(s): I51.89 - OTHER ILL-DEFINED HEART DISEASES   Status: Acute   





(3) Elevated troponin


Code(s): R79.89 - OTHER SPECIFIED ABNORMAL FINDINGS OF BLOOD CHEMISTRY   Status

: Acute   





(4) Gout


Code(s): M10.9 - GOUT, UNSPECIFIED   Status: Acute   





(5) Moderate mitral regurgitation


Code(s): I34.0 - NONRHEUMATIC MITRAL (VALVE) INSUFFICIENCY   Status: Acute   





(6) Morbid obesity


Code(s): E66.01 - MORBID (SEVERE) OBESITY DUE TO EXCESS CALORIES   Status: 

Acute   





(7) ISIS on CPAP


Code(s): G47.33 - OBSTRUCTIVE SLEEP APNEA (ADULT) (PEDIATRIC); Z99.89 - 

DEPENDENCE ON OTHER ENABLING MACHINES AND DEVICES   Status: Acute   





(8) Paroxysmal atrial fibrillation


Code(s): I48.0 - PAROXYSMAL ATRIAL FIBRILLATION   Status: Acute   





(9) Proteinuria


Code(s): R80.9 - PROTEINURIA, UNSPECIFIED   Status: Acute   





(10) RSV (respiratory syncytial virus infection)


Code(s): B97.4 - RESPIRATORY SYNCYTIAL VIRUS CAUSING DISEASES CLASSD ELSWHR   

Status: Acute   





(11) Lower extremity edema


Code(s): R60.0 - LOCALIZED EDEMA   Status: Acute   





(12) Physical deconditioning


Code(s): R53.81 - OTHER MALAISE   Status: Acute   





(13) Pre-syncope


Status: Acute   





(14) CKD (chronic kidney disease) stage 3, GFR 30-59 ml/min


Code(s): N18.3 - CHRONIC KIDNEY DISEASE, STAGE 3 (MODERATE)   Status: Chronic   





(15) Dyslipidemia


Code(s): E78.5 - HYPERLIPIDEMIA, UNSPECIFIED   Status: Chronic   





(16) Falls


Code(s): W19.XXXA - UNSPECIFIED FALL, INITIAL ENCOUNTER   Status: Chronic   


Qualifiers: 


   Encounter type: subsequent encounter   Qualified Code(s): W19.XXXD - 

Unspecified fall, subsequent encounter   





(17) HTN (hypertension)


Code(s): I10 - ESSENTIAL (PRIMARY) HYPERTENSION   Status: Chronic   


Qualifiers: 


   Hypertension type: essential hypertension   Qualified Code(s): I10 - 

Essential (primary) hypertension   





(18) Hypertension


Code(s): I10 - ESSENTIAL (PRIMARY) HYPERTENSION   Status: Chronic   





(19) Obesity


Code(s): E66.9 - OBESITY, UNSPECIFIED   Status: Chronic   


Qualifiers: 


   Obesity classification: adult class 3 (BMI >= 40)   Body mass index: BMI 40.0

-44.9 





(20) Acute kidney injury


Code(s): N17.9 - ACUTE KIDNEY FAILURE, UNSPECIFIED   Status: Resolved   





(21) Constipation


Code(s): K59.00 - CONSTIPATION, UNSPECIFIED   Status: Resolved   





(22) Hypertensive urgency, malignant


Code(s): I16.0 - HYPERTENSIVE URGENCY   Status: Resolved   





(23) Metabolic encephalopathy


Code(s): G93.41 - METABOLIC ENCEPHALOPATHY   Status: Resolved   





- Plan





Plan:


ICU


Infectious disease consultation, recommendations appreciated


ENT consultation, recommendations appreciated


Pulm/ CC consultation, recommendations appreciated


nephrology consultation, recommendations appreciated


Palliative care consultation, recommendations appreciated


Vent weaning when able


Maintain MAP >65


ABX per ID


De escalate to culture and sensitivity as able


Right sided neck swelling diagnosed as Parotitis, with expressed purulent 

discharge, culture sent


RSV


acute kidney injury on chronic kidney disease, improving with maximal medical 

therapy per nephrologist


blood pressure not controlled, adjust medications as appropriate by nephrology


blood pressure control


blood sugar control


Replace electrolytes as needed


G.I. prophylaxis


DVT prophylaxis





Dispostion: Goals of care to be addressed with family. Long term prognosis 

guarded

## 2020-01-03 NOTE — PDOC.PALCO
Palliative Care Consult





- Consult Details


Requesting Physician: Dr Cueva


Reason for Consult: goals of care, family support


Family Members Present: bushra Patrick





- Pertinent HPI





Patient initially presented to the emergency room with shortness of breath on 12 /21 stating she had been short of breath for over a month with increase in 

cough.  ER evaluation identified hypertensive episode with atrial fib. Admitted 

for further evaluation and medical management. Decline occurred during course 

of stay and patient required intubation for protection of airway and found to 

have a right sided enlargement of the parotid gland, ENT drained and obtained 

cx abx initiated. 





- Pertinent PMH





COPD, HDL, HTN, Atrialfib, and history of TIA, CHF





- Social History


Smoking Status: Never smoker


Smoking: no tobacco exposure


Alcohol Use: rarely


Drug Use History: none


Living Situation: with family/parents





- Medications


MAR Reviewed: Yes





- Allergies


Allergies/Adverse Reactions: 


 Allergies











Allergy/AdvReac Type Severity Reaction Status Date / Time


 


iodine Allergy Severe Swollen Verified 12/21/19 16:51





   Lips  


 


primidone [From Mysoline] Allergy Severe  Verified 12/21/19 16:51


 


zoster vaccine live Allergy Severe Anaphylaxis Verified 12/21/19 16:51





[From ZOSTAVAX (PF)]     














- Subjective





Intubated, non responsive at time of assessment, [facility] at bedside. 





- ROS


Non Response: due to endotracheal tube, due to mental status





- Objective


Vital Signs: 


 Vital Signs - Most Recent











Temp Pulse Resp BP Pulse Ox


 


 98.0 F   74   12   177/71 H  96 


 


 01/03/20 12:00  01/03/20 14:20  01/03/20 14:15  01/03/20 14:20  01/03/20 14:15











Palliative Performance Scale: 20





- Physical Exam


Constitutional: encephalitic


HEENT: moist MMs, sclera anicteric


Respiratory: unlabored breathing


Cardiovascular: no significant murmur


Gastrointestinal: non-tender, positive bowel sounds


Genitourinary: ash catheter


Musculoskeletal: edema present


Neurology: no focal deficits


Skin: normal turgor





- Problem List


(1) Palliative care encounter


Code(s): Z51.5 - ENCOUNTER FOR PALLIATIVE CARE   Current Visit: Yes   Status: 

Acute   





(2) COPD with exacerbation


Code(s): J44.1 - CHRONIC OBSTRUCTIVE PULMONARY DISEASE W (ACUTE) EXACERBATION   

Current Visit: Yes   Status: Acute   





(3) Diastolic dysfunction


Code(s): I51.89 - OTHER ILL-DEFINED HEART DISEASES   Current Visit: Yes   Status

: Acute   





(4) Morbid obesity


Code(s): E66.01 - MORBID (SEVERE) OBESITY DUE TO EXCESS CALORIES   Current Visit

: Yes   Status: Acute   





(5) ISIS on CPAP


Code(s): G47.33 - OBSTRUCTIVE SLEEP APNEA (ADULT) (PEDIATRIC); Z99.89 - 

DEPENDENCE ON OTHER ENABLING MACHINES AND DEVICES   Current Visit: Yes   Status

: Acute   





(6) Physical deconditioning


Code(s): R53.81 - OTHER MALAISE   Current Visit: No   Status: Acute   





- Plan/Recommendations


Plan:


Met with patient bushra, attempting to meet with her mother who is the 

MPOA. Patient has 4 children. 





Initial discussion in relation to goals of care.

















[40] minutes spent on this encounter with >50% of the time in counseling and 

coordination of care.





Thank you for this very appropriate consult.

## 2020-01-03 NOTE — PRG
DATE OF SERVICE:  01/03/2020



SUBJECTIVE:  Ms. Leach is an 85-year-old black female, followed up by the Renal

Service for acute kidney injury on top of her chronic renal failure.  She was

diagnosed with bilateral parotitis recently.  Empiric IV antibiotics are being 
given

for this patient.  Yesterday, she went to respiratory distress.  For that

reason, she was intubated and placed in the ICU. 



OBJECTIVE:  VITAL SIGNS:  Blood pressure is noted at 95/42, heart rate 62,

respiratory rate 12, pulse ox 97%. 

GENERAL:  The patient is arousable, intubated on ventilator support. 

SKIN:  Adequate turgor. 

HEENT:  Pinkish conjunctivae.  Anicteric sclerae. 

NECK:  No neck mass.  No carotid bruits.  No JVD. 

CHEST:  No deformities. 

LUNGS:  Decreased breath sounds. 

HEART:  Normal sinus rhythm.  No murmur.  No gallops.  No rubs. 

ABDOMEN:  Globular, soft, and nontender.  No masses. 

EXTREMITIES:  No edema.  No deformities.



MEDICATIONS:  Medications of January 03, 2020, were reviewed.



LABORATORY DATA:  Laboratories of January 03, 2020, showed the following:  White

count 49.7, hemoglobin 9.9.  Sodium 140, potassium 5.4, chloride 107, carbon 
dioxide

23, BUN 91, creatinine 2.25, GFR 25 mL/minute, calcium 9.2. 



Troponin I of 0.303.



ASSESSMENT AND PLAN:  

1. Acute kidney injury/chronic renal failure, superimposed prerenal azotemia.  
Renal

function is relatively stable.  Currently, on IV fluid of normal saline at 100

mL/hour.  Continue current management.  No indication for any dialytic 
intervention. 

2. Bilateral parotitis/respiratory syncytial virus infection - the patient is on

empiric 

antibiotics.  ID is following.

3. Mild hyperkalemia.  We will simply observe this.

4. Acute respiratory failure - the patient is intubated on ventilator support.  
She

has underlying RSV infection of her lungs. 







Job ID:  [id number]



[facility]

## 2020-01-04 LAB
ANALYZER IN CARDIO: (no result)
ANION GAP SERPL CALC-SCNC: 12 MMOL/L (ref 10–20)
BASE EXCESS STD BLDA CALC-SCNC: -2.7 MEQ/L
BUN SERPL-MCNC: 97 MG/DL (ref 9.8–20.1)
CA-I BLDA-SCNC: 1.24 MMOL/L (ref 1.12–1.3)
CALCIUM SERPL-MCNC: 9.2 MG/DL (ref 7.8–10.44)
CHLORIDE SERPL-SCNC: 107 MMOL/L (ref 98–107)
CO2 SERPL-SCNC: 23 MMOL/L (ref 23–31)
CREAT CL PREDICTED SERPL C-G-VRATE: 30 ML/MIN (ref 70–130)
GLUCOSE SERPL-MCNC: 211 MG/DL (ref 83–110)
HCO3 BLDA-SCNC: 22.2 MEQ/L (ref 22–28)
HCT VFR BLDA CALC: 28 % (ref 36–47)
HGB BLDA-MCNC: 9.6 G/DL (ref 12–16)
PCO2 BLDA: 38.8 MMHG (ref 35–45)
PH BLDA: 7.38 [PH] (ref 7.35–7.45)
PO2 BLDA: 78.7 MMHG (ref 60–?)
POTASSIUM BLD-SCNC: 4.84 MMOL/L (ref 3.7–5.3)
POTASSIUM SERPL-SCNC: 5.1 MMOL/L (ref 3.5–5.1)
SODIUM SERPL-SCNC: 137 MMOL/L (ref 136–145)
SPECIMEN DRAWN FROM PATIENT: (no result)
VANCOMYCIN TROUGH SERPL-MCNC: 23.7 UG/ML

## 2020-01-04 RX ADMIN — ASPIRIN AND EXTENDED-RELEASE DIPYRIDAMOLE SCH CAP: 25; 200 CAPSULE ORAL at 10:04

## 2020-01-04 RX ADMIN — FAMOTIDINE SCH MG: 10 INJECTION, SOLUTION INTRAVENOUS at 10:04

## 2020-01-04 RX ADMIN — ALBUMIN HUMAN SCH GM: 250 SOLUTION INTRAVENOUS at 12:17

## 2020-01-04 RX ADMIN — FENTANYL CITRATE SCH MLS: 50 INJECTION, SOLUTION INTRAMUSCULAR; INTRAVENOUS at 02:12

## 2020-01-04 RX ADMIN — ASPIRIN AND EXTENDED-RELEASE DIPYRIDAMOLE SCH CAP: 25; 200 CAPSULE ORAL at 20:06

## 2020-01-04 RX ADMIN — ALBUMIN HUMAN SCH GM: 250 SOLUTION INTRAVENOUS at 23:06

## 2020-01-04 RX ADMIN — ALBUMIN HUMAN SCH GM: 250 SOLUTION INTRAVENOUS at 17:15

## 2020-01-04 NOTE — PRG
DATE OF SERVICE:  01/04/2020



SERVICE:  Pulmonary Medicine.



INTERVAL HISTORY:  The patient is doing fine from respiratory standpoint.  
Breathing

comfortably.  She is on mechanical ventilation.  It is quite uncomfortable to

express her glands.  She is sedated right now.  She cannot provide any 
additional

elements of the history.  Otherwise, there were no events. 



PHYSICAL EXAMINATION:

VITAL SIGNS:  Afebrile, pulse rate 71, blood pressure 146/54, respirations are 
19,

and saturation 98%, currently on 27% FiO2 and a PEEP of 5. 

GENERAL:  The patient is intubated today. 

HEENT:  Normocephalic and atraumatic.  Sclerae white.  Conjunctivae pink.  Oral

mucosa is moist without lesions. 

LUNGS:  Decent air entry.  No prolonged expiratory phase appreciated.  Rhonchi 
are

noted. 

HEART:  Normal rate, regular. 

ABDOMEN:  Soft, nontender, and nondistended.  Bowel sounds are positive. 

MUSCULOSKELETAL:  No cyanosis or clubbing.  There is 2+ pitting throughout. 

NEUROLOGIC:  Grossly nonfocal.



LABORATORY DATA:  WBC 49.7, hemoglobin 9.9, and platelets are 145,000.  PH of 
7.38,

pCO2 of 38, and PO2 of 78.  Creatinine 2.48, gently up-trending.  Basic 
metabolic

profile is otherwise unremarkable.  Troponin 0.3.  Urinalysis is unremarkable.

Respiratory culture is growing Staph aureus as well as Pseudomonas aeruginosa.  
The

Pseudomonas is pansensitive, the staph is sensitive to most drugs.  Respiratory

virus panel is positive for respiratory syncytial virus A. 



IMAGING DATA:  Chest x-ray demonstrates good placement of the endotracheal tube.

Enteric catheter courses below the level of the diaphragm.  Likely small 
bilateral

pleural effusions are present.  Cardiac silhouette is enlarged.  There is 
possible

infiltrate in the dependent regions. 



ASSESSMENT:  

1. Severe sepsis.

2. Parotitis, bilateral.

3. Healthcare associated pneumonia secondary to methicillin-resistant 
staphylococcus

aureus, and Pseudomonas. 

4. Acute kidney injury on chronic kidney disease 4.

5. Anemia.



DISCUSSION AND PLAN:  We will continue expressing the parotid glands multiple 
times

on a daily basis.  Antibiotics will be continued.  The fact that we are dealing 
with

bilateral inflammation of the parotid glands makes me increasingly suspicious 
of a

possible viral etiology with superimposed bacterial process on the right.  
Pulmonary 

Critical Care will continue to follow along. 



CRITICAL CARE TIME:  30 minutes.







Job ID:  [id number]



[facility]

## 2020-01-04 NOTE — PDOC.HOSPP
- Subjective


Subjective: 





Seen and examined. Intubated, sedated. Parotid gland being massaged by nursing 

staff and purulent discharge being excreted. No family available this a.m. at 

bedside. Discussed case with palliative care nurse practitioner who states that 

family meeting will be on Monday at 1 PM, I would be happy to attend.





- Objective


Vital Signs & Weight: 


 Vital Signs (12 hours)











  Temp Pulse Resp BP Pulse Ox


 


 01/04/20 15:16   76   


 


 01/04/20 15:15   79  12   96


 


 01/04/20 15:11   67   133/49 L 


 


 01/04/20 12:00    12  


 


 01/04/20 11:24   67   133/49 L 


 


 01/04/20 11:00  98.7 F    


 


 01/04/20 10:29   67   133/49 L 


 


 01/04/20 10:27   66  12   98


 


 01/04/20 10:00    12  


 


 01/04/20 08:00    12   96


 


 01/04/20 07:00  98.3 F    


 


 01/04/20 06:35   74   155/55 H 


 


 01/04/20 06:34   73  12   98


 


 01/04/20 06:00    15  


 


 01/04/20 05:00  98.4 F    








 Weight











Admit Weight                   243 lb


 


Weight                         254 lb 3.088 oz











 Most Recent Monitor Data











Heart Rate from ECG            69


 


NIBP                           150/54


 


NIBP BP-Mean                   86


 


Respiration from ECG           19


 


SpO2                           97














I&O: 


 











 01/03/20 01/04/20 01/05/20





 06:59 06:59 06:59


 


Intake Total 1648 3528.1 190


 


Output Total 835 565 190


 


Balance 813 2963.1 0











Result Diagrams: 


 01/03/20 03:00





 01/04/20 07:34


Radiology Reviewed by me: Yes





Hospitalist ROS





- Review of Systems


ROS unobtainable: due to endotracheal tube





- Medication


Medications: 


Active Medications











Generic Name Dose Route Start Last Admin





  Trade Name Freq  PRN Reason Stop Dose Admin


 


Acetaminophen  650 mg  12/21/19 16:05  01/01/20 08:53





  Tylenol  PO   650 mg





  Q4H PRN   Administration





  Headache/Fever/Mild Pain (1-3)   





     





     





     


 


Albumin Human  25 gm  01/04/20 12:00  01/04/20 12:17





  Albumin 25%  IVPB  01/05/20 06:01  25 gm





  Q6HR CORRINE   Administration





     





     





     





     


 


Albuterol/Ipratropium  3 ml  12/21/19 22:30  01/04/20 15:15





  Duoneb  EZPAP   3 ml





  [id number] CORRINE   Administration





     





     





     





     


 


Carvedilol  25 mg  12/21/19 21:00  01/04/20 10:04





  Coreg  PO   25 mg





  BID CORRINE   Administration





     





     





     





     


 


Clonidine  0.3 mg  12/26/19 09:00  01/02/20 10:30





  Catapres-Tts-3  TD   Not Given





  Q7DAYS CORRINE   





     





     





     





     


 


Dipyridamole/Aspirin  1 cap  12/21/19 21:00  01/04/20 10:04





  Aggrenox  PO   1 cap





  BID CORRINE   Administration





     





     





     





     


 


Enoxaparin Sodium  30 mg  01/03/20 09:00  01/04/20 10:02





  Lovenox  SC   30 mg





  0900 CORRINE   Administration





     





     





     





     


 


Famotidine  20 mg  01/03/20 09:00  01/04/20 10:04





  Pepcid  SLOW IVP   20 mg





  DAILY CORRINE   Administration





     





     





     





     


 


Hydralazine HCl  10 mg  01/02/20 13:43  01/02/20 14:42





  Apresoline  SLOW IVP   10 mg





  Q4H PRN   Administration





  Hypertension SBP>180   





     





     





     


 


Hydralazine HCl  100 mg  01/04/20 15:00  01/04/20 15:11





  Apresoline  PO   100 mg





  TID CORRINE   Administration





     





     





     





     


 


Cefepime HCl 2 gm/ Sodium  100 mls @ 200 mls/hr  01/02/20 22:30  01/03/20 22:16





  Chloride  IVPB  01/08/20 22:59  100 mls





  2230 CORRINE   Administration





     





     





     





     


 


Fentanyl Citrate 2,000 mcg/  100 mls @ 0 mls/hr  01/02/20 21:13  01/04/20 02:12





  Sodium Chloride  IV  02/01/20 21:13  100 mls





  INF CORRINE   Administration





     





     





  Protocol   





  Per Protocol   


 


Vancomycin HCl 500 mg/ Sodium  100 mls @ 100 mls/hr  01/03/20 22:00  01/03/20 21

:18





  Chloride  IVPB   100 mls





  2200 CORRINE   Administration





     





     





     





     


 


Methylprednisolone Sodium Succinate  20 mg  01/03/20 09:00  01/04/20 10:04





  Solu-Medrol  IVP   20 mg





  BID CORRINE   Administration





     





     





     





     


 


Propofol  1,000 mg  01/02/20 21:13  01/04/20 10:02





  Diprivan  IV  02/01/20 21:13  1,000 mg





  INF PRN   Administration





  TO ACHIEVE GOAL RASS   





     





  Protocol   





     














- Exam


General Appearance: NAD, awake alert


Eye: PERRL, anicteric sclera


ENT: normocephalic atraumatic, moist mucosa


Neck: supple, symmetric, no lymphadenopathy


Heart: no murmur, no gallops, no rubs


Respiratory: no rales, normal chest expansion, no tachypnea, rhonchi (few course

), wheezes (few scattered)


Gastrointestinal: soft, non-tender, no guarding, no rigidity


Extremities: 1+ LE edema


Skin: no lesions, no rashes


Neurological: cranial nerve grossly intact, no focal deficits


Musculoskeletal: generalized weakness





Hosp A/P


(1) COPD with exacerbation


Code(s): J44.1 - CHRONIC OBSTRUCTIVE PULMONARY DISEASE W (ACUTE) EXACERBATION   

Status: Acute   





(2) Diastolic dysfunction


Code(s): I51.89 - OTHER ILL-DEFINED HEART DISEASES   Status: Acute   





(3) Elevated troponin


Code(s): R79.89 - OTHER SPECIFIED ABNORMAL FINDINGS OF BLOOD CHEMISTRY   Status

: Acute   





(4) Gout


Code(s): M10.9 - GOUT, UNSPECIFIED   Status: Acute   





(5) Moderate mitral regurgitation


Code(s): I34.0 - NONRHEUMATIC MITRAL (VALVE) INSUFFICIENCY   Status: Acute   





(6) Morbid obesity


Code(s): E66.01 - MORBID (SEVERE) OBESITY DUE TO EXCESS CALORIES   Status: 

Acute   





(7) ISIS on CPAP


Code(s): G47.33 - OBSTRUCTIVE SLEEP APNEA (ADULT) (PEDIATRIC); Z99.89 - 

DEPENDENCE ON OTHER ENABLING MACHINES AND DEVICES   Status: Acute   





(8) Paroxysmal atrial fibrillation


Code(s): I48.0 - PAROXYSMAL ATRIAL FIBRILLATION   Status: Acute   





(9) Proteinuria


Code(s): R80.9 - PROTEINURIA, UNSPECIFIED   Status: Acute   





(10) RSV (respiratory syncytial virus infection)


Code(s): B97.4 - RESPIRATORY SYNCYTIAL VIRUS CAUSING DISEASES CLASSD ELSWHR   

Status: Acute   





(11) Lower extremity edema


Code(s): R60.0 - LOCALIZED EDEMA   Status: Acute   





(12) Physical deconditioning


Code(s): R53.81 - OTHER MALAISE   Status: Acute   





(13) Pre-syncope


Status: Acute   





(14) CKD (chronic kidney disease) stage 3, GFR 30-59 ml/min


Code(s): N18.3 - CHRONIC KIDNEY DISEASE, STAGE 3 (MODERATE)   Status: Chronic   





(15) Dyslipidemia


Code(s): E78.5 - HYPERLIPIDEMIA, UNSPECIFIED   Status: Chronic   





(16) Falls


Code(s): W19.XXXA - UNSPECIFIED FALL, INITIAL ENCOUNTER   Status: Chronic   


Qualifiers: 


   Encounter type: subsequent encounter   Qualified Code(s): W19.XXXD - 

Unspecified fall, subsequent encounter   





(17) HTN (hypertension)


Code(s): I10 - ESSENTIAL (PRIMARY) HYPERTENSION   Status: Chronic   


Qualifiers: 


   Hypertension type: essential hypertension   Qualified Code(s): I10 - 

Essential (primary) hypertension   





(18) Hypertension


Code(s): I10 - ESSENTIAL (PRIMARY) HYPERTENSION   Status: Chronic   





(19) Obesity


Code(s): E66.9 - OBESITY, UNSPECIFIED   Status: Chronic   


Qualifiers: 


   Obesity classification: adult class 3 (BMI >= 40)   Body mass index: BMI 40.0

-44.9 





(20) Acute kidney injury


Code(s): N17.9 - ACUTE KIDNEY FAILURE, UNSPECIFIED   Status: Resolved   





(21) Constipation


Code(s): K59.00 - CONSTIPATION, UNSPECIFIED   Status: Resolved   





(22) Hypertensive urgency, malignant


Code(s): I16.0 - HYPERTENSIVE URGENCY   Status: Resolved   





(23) Metabolic encephalopathy


Code(s): G93.41 - METABOLIC ENCEPHALOPATHY   Status: Resolved   





- Plan





Plan:


ICU


Pulm/ CC consultation, recommendations appreciated


Infectious disease consultation, recommendations appreciated


ENT consultation, recommendations appreciated


nephrology consultation, recommendations appreciated


Palliative care consultation, recommendations appreciated


Vent weaning when able


Maintain MAP >65


ABX per ID


De escalate to culture and sensitivity as able


Bilateral neck swelling diagnosed as Parotitis, with expressed purulent 

discharge, culture sent and noted


RSV


acute kidney injury on chronic kidney disease


blood pressure not controlled, adjust medications as appropriate by nephrology


blood pressure control


blood sugar control


Replace electrolytes as needed


G.I. prophylaxis


DVT prophylaxis





Dispostion: Goals of care to be addressed with family. Long term prognosis 

guarded

## 2020-01-04 NOTE — RAD
PORTABLE CHEST ONE VIEW:

 

01/04/2020

 

4:41 a.m.

 

HISTORY:

Respiratory failure. Pneumonia.

 

FINDINGS:

Line and tube placements are unchanged in position. The heart is enlarged. Bilateral pleural effusion
s and left lower lobe consolidation/atelectatic changes are again noted. No pneumothoraces are seen.

 

IMPRESSION:

Stable examination.

 

POS: [facility]

## 2020-01-04 NOTE — PRG
DATE OF SERVICE:  01/04/2020



SUBJECTIVE:  Ms. Leach is an 85-year-old black female, followed up by the Renal

Service for her chronic renal failure/acute kidney injury.  Creatinine in the past

has fluctuated and has been empirically treated with volume repletion with

improvement in renal function.  In the last few days, creatinine was slowly

worsening.  Her hospitalization has also been marred by her acute respiratory

failure.  She has also been noted to have leukocytosis/parotitis.  Empiric IV

antibiotics have been started with this patient. 



OBJECTIVE:  VITAL SIGNS:  Blood pressure is 147/54, heart rate 71, respiratory rate

17, and pulse ox 95%. 

GENERAL:  The patient is sedated, intubated on ventilator support. 

SKIN:  Adequate turgor. 

HEENT:  She has pinkish conjunctivae.  Anicteric sclerae. 

NECK:  No neck mass.  No carotid bruits.  No JVD. 

CHEST:  No deformities. 

LUNGS:  Decreased breath sounds. 

HEART:  Normal sinus rhythm.  No murmurs.  No gallops.  No rubs. 

ABDOMEN:  Globular, soft, nontender.  No masses. 

EXTREMITIES:  She has trace edema.  No deformities.



MEDICATIONS:  Of January 4, 2020, were reviewed.



LABORATORY DATA:  Laboratories of January 4, 2020:  Sodium 137, potassium 5.1,

chloride 107, carbon dioxide 23, BUN 97, creatinine 2.48, glucose 211, calcium 9.2. 



ASSESSMENT AND PLAN:  

1. Acute kidney injury/chronic renal failure, fluctuating creatinine.  Superimposed

prerenal azotemia still remains with this patient.  Continue to observe.  If renal

function will further worsen, consider initiating albumin infusion again. 

2. Leukocytosis/? pneumonia - on empiric IV antibiotics.

3. Parotitis.  Supportive care.  We will check for mumps titer with this patient.

4. Acute respiratory failure.  Continue supportive care.  Pulmonary is following.

5. Recheck basic metabolics and CBC in a.m.







Job ID:  [id number]

## 2020-01-05 LAB
ANION GAP SERPL CALC-SCNC: 15 MMOL/L (ref 10–20)
BUN SERPL-MCNC: 106 MG/DL (ref 9.8–20.1)
CALCIUM SERPL-MCNC: 9.4 MG/DL (ref 7.8–10.44)
CHLORIDE SERPL-SCNC: 107 MMOL/L (ref 98–107)
CO2 SERPL-SCNC: 22 MMOL/L (ref 23–31)
CREAT CL PREDICTED SERPL C-G-VRATE: 28 ML/MIN (ref 70–130)
GLUCOSE SERPL-MCNC: 197 MG/DL (ref 83–110)
HGB BLD-MCNC: 7.4 G/DL (ref 12–16)
HGB BLD-MCNC: 7.8 G/DL (ref 12–16)
MCH RBC QN AUTO: 32.6 PG (ref 27–31)
MCV RBC AUTO: 101 FL (ref 78–98)
MDIFF COMPLETE?: YES
PLATELET # BLD AUTO: 107 THOU/UL (ref 130–400)
POTASSIUM SERPL-SCNC: 5.1 MMOL/L (ref 3.5–5.1)
RBC # BLD AUTO: 2.38 MILL/UL (ref 4.2–5.4)
REF LAB NAME: (no result)
REF LAB TEST NAME: (no result)
SCHISTOCYTES BLD QL SMEAR: (no result) (100X)
SODIUM SERPL-SCNC: 139 MMOL/L (ref 136–145)
TARGETS BLD QL SMEAR: (no result) (100X)
WBC # BLD AUTO: 34 THOU/UL (ref 4.8–10.8)

## 2020-01-05 RX ADMIN — FAMOTIDINE SCH MG: 10 INJECTION, SOLUTION INTRAVENOUS at 09:08

## 2020-01-05 RX ADMIN — FENTANYL CITRATE SCH MLS: 50 INJECTION, SOLUTION INTRAMUSCULAR; INTRAVENOUS at 01:00

## 2020-01-05 RX ADMIN — ALBUMIN HUMAN SCH GM: 250 SOLUTION INTRAVENOUS at 17:04

## 2020-01-05 RX ADMIN — ASPIRIN AND EXTENDED-RELEASE DIPYRIDAMOLE SCH CAP: 25; 200 CAPSULE ORAL at 09:08

## 2020-01-05 RX ADMIN — ASPIRIN AND EXTENDED-RELEASE DIPYRIDAMOLE SCH CAP: 25; 200 CAPSULE ORAL at 20:15

## 2020-01-05 RX ADMIN — ALBUMIN HUMAN SCH GM: 250 SOLUTION INTRAVENOUS at 05:23

## 2020-01-05 NOTE — PRG
DATE OF SERVICE:  01/05/2020



SERVICE:  Renal Medicine.



SUBJECTIVE:  Ms. Leach is an 85-year-old black female, followed up for her chronic

renal failure.  Her renal function has been fluctuating in the last several days.  I

suspect a possible prerenal component.  We will continue albumin infusion.  I have

ordered 25 g IV q.6 for another 4 doses.  She also is being treated for possible

viral bilateral parotitis.  She is also empirically being covered with IV

antibiotics due to the recent leukocytosis.  Of interest, her white count has

dropped down to 34,000 from a peak of 50,000.  She is still intubated.  The plan is

to wean her off her ventilator tomorrow. 



OBJECTIVE:  VITAL SIGNS:  Blood pressure is 147/46, O2 saturation 94%, heart rate

68, and respiratory rate 18. 

GENERAL:  The patient is intubated on ventilator support.  Arousable. 

SKIN:  Adequate turgor. 

HEENT:  Slightly pale conjunctivae.  Anicteric sclerae. 

NECK:  No neck mass.  No carotid bruits.  No JVD. 

CHEST:  No deformities. 

LUNGS:  Decreased breath sounds. 

HEART:  Normal sinus rhythm.  No murmur.  No gallops.  No rubs. 

ABDOMEN:  Globular, soft, and nontender.  No masses. 

EXTREMITIES:  No edema.  No deformities.



MEDICATIONS:  Medications of January 5, 2020, was reviewed.



LABORATORY DATA:  Laboratories of January 5, 2020; sodium 139, potassium 5.1,

chloride 107, carbon dioxide 22, , creatinine 2.77, glucose 197, and calcium

9.4.  White count 34,000, hemoglobin 7.8. 



ASSESSMENT AND PLAN:  

1. Acute kidney injury on top of chronic renal failure, superimposed prerenal

azotemia.  Continue albumin infusion at 25 g IV q.6h.  No indication for any

dialytic intervention. 

2. Leukocytosis, clinically much improved on empiric IV antibiotics.

3. Bilateral parotitis considering for a viral etiology with this.  Continue

supportive care. 

4. Acute respiratory failure, for eventual extubation tomorrow.  Pulmonary is

following.  Overall, prognosis remains guarded with this patient. 







Job ID:  [id number]

## 2020-01-05 NOTE — PRG
DATE OF SERVICE:  01/05/2020



SUBJECTIVE:  Intubated.



OBJECTIVE:  VITAL SIGNS:  /50.  She has been afebrile.  Heart rate 72. 

HEENT:  Indurated right parotid.  The left one is quite soft.  Orotracheal

intubation. 

LUNGS:  Symmetric air entry. 

HEART:  S1 and S2.  Regular rate. 

ABDOMEN:  Soft. 

EXTREMITIES:  Left upper and lower extremity flaccidity.  A little bit of movement

in the left foot, but certainly much weaker than the right side.  The left side does

not require restraints as opposed to the right. 



LABORATORY DATA:  White cell count 34,000, hemoglobin 7.8, platelets 107,000, and

96% neutrophils.  Sodium 139 and creatinine 2.77.  Liver profile normal.  Two sets

of blood cultures from January 2, no growth at 48 hours. 



ASSESSMENT AND DISCUSSION:  Obesity, sleep apnea, cardiomyopathy, chronic kidney

disease stage 3 from hypertension, leg edema and ulcers, prior cerebrovascular

accident, swelling of the right parotid gland with induration and purulent exudate

consistent with suppurative parotitis.  The left gland, I do not think it is

involved by any inflammatory process, it is quite soft.  There is this flaccidity in

the left side.  She does have a history of prior cerebrovascular accident and

sometimes during acute illnesses, the patients with prior cerebrovascular accident

may develop decompensation of their motor function at least transiently, but may

require reimaging study depending on clinical progress.  Continue cefepime.

Discontinue vancomycin. 







Job ID:  [id number]

## 2020-01-05 NOTE — PRG
DATE OF SERVICE:  01/05/2020



SERVICE:  Pulmonary Medicine.



INTERVAL HISTORY:  The patient is doing really well from a respiratory standpoint.

Oxygen requirements are decreasing.  She is comfortable and has no specific

complaints.  Otherwise, the amount of swelling in the parotid gland seems to be

improving slightly.  Whenever we express them, she is exquisitely painful. 



PHYSICAL EXAMINATION:

VITAL SIGNS:  Afebrile, pulse 68, blood pressure 148/46 respirations 14, and

saturation 95%, currently on 26% FiO2 and a PEEP of 5. 

GENERAL:  The patient is awake, but somnolent on sedation. 

HEENT:  Normocephalic and atraumatic.  Sclerae white.  Conjunctivae pink.  Oral

mucosa is moist without lesions. 

LUNGS:  Wonderful air entry.  Dependent crackles are slight.  No prolonged

expiratory phase or wheezing is appreciated. 

HEART:  Normal rate.  Regular. 

ABDOMEN:  Soft, nontender, and nondistended.  Bowel sounds are positive. 

MUSCULOSKELETAL:  No cyanosis or clubbing.  There is diffuse 2+ pitting throughout. 

NEUROLOGIC:  Grossly nonfocal.



LABORATORY DATA:  WBC 34.0, hemoglobin 7.8, and platelets 107,000.  Creatinine 2.77

and gently uptrending and .  Basic metabolic profile is otherwise

unremarkable.  Urinalysis is unremarkable.  Staph aureus is growing in the

respiratory culture and bacterial culture of the mouth abscess.  Blood cultures x2

are negative to-date. 



ASSESSMENT:  

1. Severe sepsis.

2. Parotitis bilateral.

3. Healthcare-associated pneumonia secondary to methicillin-resistant Staphylococcus

aureus, and Pseudomonas. 

4. Acute kidney injury on chronic kidney disease 4.

5. Anemia.



DISCUSSION AND PLAN:  I am going to leave the patient intubated for an additional 24

hours.  She is requiring significant amounts of sedation each time her parotid

glands are expressed.  The swelling has significantly improved.  We will continue

her supportive care including antibiotics.  I am doubtful that she has a bilateral

bacterial parotid infection.  It is more likely to represent a virus.  Mumps titers

are currently pending.  They are send out labs and will take some time to come back.

 To my knowledge, respiratory syncytial virus is not associated with bilateral

parotid disease. 



CRITICAL CARE TIME:  30 minutes.







Job ID:  [id number]

## 2020-01-05 NOTE — PDOC.HOSPP
- Subjective


Subjective: 





Seen and examined. Remains in intensive care unit intubated and sedated. WBC 

count is down trending now with broad-spectrum antibiotics. Long-term prognosis 

guarded. No family available at bedside this a.m. Family meeting plan for 

tomorrow.





- Objective


Vital Signs & Weight: 


 Vital Signs (12 hours)











  Temp Pulse Resp BP Pulse Ox


 


 01/05/20 11:00  98.3 F    


 


 01/05/20 10:51   63   164/53 H 


 


 01/05/20 10:49   64  12   95


 


 01/05/20 10:00    12  


 


 01/05/20 09:08   71   147/46 H 


 


 01/05/20 08:00    12  


 


 01/05/20 07:54      94 L


 


 01/05/20 07:00  98.3 F    


 


 01/05/20 06:39   71   147/46 H 


 


 01/05/20 06:38   73  12   93 L


 


 01/05/20 06:00    12  


 


 01/05/20 04:09   71   163/46 H 


 


 01/05/20 04:00    12  


 


 01/05/20 03:00  98.5 F    


 


 01/05/20 02:16   71   157/52 H 


 


 01/05/20 02:00    12  








 Weight











Admit Weight                   243 lb


 


Weight                         261 lb 14.546 oz











 Most Recent Monitor Data











Heart Rate from ECG            64


 


NIBP                           133/38


 


NIBP BP-Mean                   69


 


Respiration from ECG           13


 


SpO2                           96














I&O: 


 











 01/04/20 01/05/20 01/06/20





 06:59 06:59 06:59


 


Intake Total 3528.1 2367.5 100


 


Output Total 565 770 185


 


Balance 2963.1 1597.5 -85











Result Diagrams: 


 01/05/20 05:36





 01/05/20 05:36


Radiology Reviewed by me: Yes





Hospitalist ROS





- Review of Systems


ROS unobtainable: due to endotracheal tube





- Medication


Medications: 


Active Medications











Generic Name Dose Route Start Last Admin





  Trade Name Freq  PRN Reason Stop Dose Admin


 


Acetaminophen  650 mg  12/21/19 16:05  01/01/20 08:53





  Tylenol  PO   650 mg





  Q4H PRN   Administration





  Headache/Fever/Mild Pain (1-3)   





     





     





     


 


Albuterol/Ipratropium  3 ml  12/21/19 22:30  01/05/20 10:49





  Duoneb  EZPAP   3 ml





  [id number] CORRINE   Administration





     





     





     





     


 


Carvedilol  25 mg  12/21/19 21:00  01/05/20 09:08





  Coreg  PO   25 mg





  BID CORRINE   Administration





     





     





     





     


 


Clonidine  0.3 mg  12/26/19 09:00  01/02/20 10:30





  Catapres-Tts-3  TD   Not Given





  Q7DAYS CORRINE   





     





     





     





     


 


Dipyridamole/Aspirin  1 cap  12/21/19 21:00  01/05/20 09:08





  Aggrenox  PO   1 cap





  BID CORRINE   Administration





     





     





     





     


 


Enoxaparin Sodium  30 mg  01/03/20 09:00  01/05/20 09:07





  Lovenox  SC   30 mg





  0900 CORRINE   Administration





     





     





     





     


 


Famotidine  20 mg  01/03/20 09:00  01/05/20 09:08





  Pepcid  SLOW IVP   20 mg





  DAILY CORRINE   Administration





     





     





     





     


 


Hydralazine HCl  10 mg  01/02/20 13:43  01/05/20 04:09





  Apresoline  SLOW IVP   10 mg





  Q4H PRN   Administration





  Hypertension SBP>180   





     





     





     


 


Hydralazine HCl  100 mg  01/04/20 15:00  01/05/20 09:08





  Apresoline  PO   100 mg





  TID CORRINE   Administration





     





     





     





     


 


Cefepime HCl 2 gm/ Sodium  100 mls @ 200 mls/hr  01/02/20 22:30  01/04/20 21:41





  Chloride  IVPB  01/08/20 22:59  100 mls





  2230 CORRINE   Administration





     





     





     





     


 


Fentanyl Citrate 2,000 mcg/  100 mls @ 0 mls/hr  01/02/20 21:13  01/05/20 01:00





  Sodium Chloride  IV  02/01/20 21:13  100 mls





  INF CORRINE   Administration





     





     





  Protocol   





  Per Protocol   


 


Vancomycin HCl 500 mg/ Sodium  100 mls @ 100 mls/hr  01/05/20 08:00  01/05/20 09

:30





  Chloride  IVPB   100 mls





  0800 CORRINE   Administration





     





     





     





     


 


Methylprednisolone Sodium Succinate  20 mg  01/03/20 09:00  01/05/20 09:07





  Solu-Medrol  IVP   20 mg





  BID CORRINE   Administration





     





     





     





     


 


Propofol  1,000 mg  01/02/20 21:13  01/05/20 09:08





  Diprivan  IV  02/01/20 21:13  1,000 mg





  INF PRN   Administration





  TO ACHIEVE GOAL RASS   





     





  Protocol   





     














- Exam


General Appearance: ill appearing


Eye: PERRL, anicteric sclera


ENT: normocephalic atraumatic, moist mucosa


Neck: supple, symmetric, no lymphadenopathy


Heart: no murmur, no gallops, no rubs


Respiratory: no rales, normal chest expansion, no tachypnea, rhonchi, wheezes


Gastrointestinal: soft, non-tender, no bruit, no guarding, no rigidity


Extremities: 2+ LE edema


Skin: no lesions, no rashes


Neurological: cranial nerve grossly intact, no focal deficits


Musculoskeletal: generalized weakness


Psychiatric: not oriented





Hosp A/P


(1) COPD with exacerbation


Code(s): J44.1 - CHRONIC OBSTRUCTIVE PULMONARY DISEASE W (ACUTE) EXACERBATION   

Status: Acute   





(2) Diastolic dysfunction


Code(s): I51.89 - OTHER ILL-DEFINED HEART DISEASES   Status: Acute   





(3) Elevated troponin


Code(s): R79.89 - OTHER SPECIFIED ABNORMAL FINDINGS OF BLOOD CHEMISTRY   Status

: Acute   





(4) Gout


Code(s): M10.9 - GOUT, UNSPECIFIED   Status: Acute   





(5) Moderate mitral regurgitation


Code(s): I34.0 - NONRHEUMATIC MITRAL (VALVE) INSUFFICIENCY   Status: Acute   





(6) Morbid obesity


Code(s): E66.01 - MORBID (SEVERE) OBESITY DUE TO EXCESS CALORIES   Status: 

Acute   





(7) ISIS on CPAP


Code(s): G47.33 - OBSTRUCTIVE SLEEP APNEA (ADULT) (PEDIATRIC); Z99.89 - 

DEPENDENCE ON OTHER ENABLING MACHINES AND DEVICES   Status: Acute   





(8) Paroxysmal atrial fibrillation


Code(s): I48.0 - PAROXYSMAL ATRIAL FIBRILLATION   Status: Acute   





(9) Proteinuria


Code(s): R80.9 - PROTEINURIA, UNSPECIFIED   Status: Acute   





(10) RSV (respiratory syncytial virus infection)


Code(s): B97.4 - RESPIRATORY SYNCYTIAL VIRUS CAUSING DISEASES CLASSD ELSWHR   

Status: Acute   





(11) Lower extremity edema


Code(s): R60.0 - LOCALIZED EDEMA   Status: Acute   





(12) Physical deconditioning


Code(s): R53.81 - OTHER MALAISE   Status: Acute   





(13) Pre-syncope


Status: Acute   





(14) CKD (chronic kidney disease) stage 3, GFR 30-59 ml/min


Code(s): N18.3 - CHRONIC KIDNEY DISEASE, STAGE 3 (MODERATE)   Status: Chronic   





(15) Dyslipidemia


Code(s): E78.5 - HYPERLIPIDEMIA, UNSPECIFIED   Status: Chronic   





(16) Falls


Code(s): W19.XXXA - UNSPECIFIED FALL, INITIAL ENCOUNTER   Status: Chronic   


Qualifiers: 


   Encounter type: subsequent encounter   Qualified Code(s): W19.XXXD - 

Unspecified fall, subsequent encounter   





(17) HTN (hypertension)


Code(s): I10 - ESSENTIAL (PRIMARY) HYPERTENSION   Status: Chronic   


Qualifiers: 


   Hypertension type: essential hypertension   Qualified Code(s): I10 - 

Essential (primary) hypertension   





(18) Hypertension


Code(s): I10 - ESSENTIAL (PRIMARY) HYPERTENSION   Status: Chronic   





(19) Obesity


Code(s): E66.9 - OBESITY, UNSPECIFIED   Status: Chronic   


Qualifiers: 


   Obesity classification: adult class 3 (BMI >= 40)   Body mass index: BMI 40.0

-44.9 





(20) Acute kidney injury


Code(s): N17.9 - ACUTE KIDNEY FAILURE, UNSPECIFIED   Status: Resolved   





(21) Constipation


Code(s): K59.00 - CONSTIPATION, UNSPECIFIED   Status: Resolved   





(22) Hypertensive urgency, malignant


Code(s): I16.0 - HYPERTENSIVE URGENCY   Status: Resolved   





(23) Metabolic encephalopathy


Code(s): G93.41 - METABOLIC ENCEPHALOPATHY   Status: Resolved   





- Plan





Plan:


ICU


Pulm/ CC consultation, recommendations appreciated


Infectious disease consultation, recommendations appreciated


ENT consultation, recommendations appreciated


nephrology consultation, recommendations appreciated


Palliative care consultation, recommendations appreciated


Vent weaning as able


Maintain MAP >65


ABX per ID


De escalate to culture and sensitivity as able


Bilateral neck swelling diagnosed as Parotitis, with expressed purulent 

discharge, culture sent and noted


RSV


acute kidney injury on chronic kidney disease


blood pressure not controlled, adjust medications as appropriate by nephrology


blood pressure control


blood sugar control


Replace electrolytes as needed


G.I. prophylaxis


DVT prophylaxis





Dispostion: Goals of care to be addressed with family. Long term prognosis 

guarded

## 2020-01-06 LAB
ANION GAP SERPL CALC-SCNC: 13 MMOL/L (ref 10–20)
BACTERIA UR QL AUTO: (no result) HPF
BUN SERPL-MCNC: 125 MG/DL (ref 9.8–20.1)
CALCIUM SERPL-MCNC: 9.2 MG/DL (ref 7.8–10.44)
CHLORIDE SERPL-SCNC: 108 MMOL/L (ref 98–107)
CO2 SERPL-SCNC: 22 MMOL/L (ref 23–31)
CREAT CL PREDICTED SERPL C-G-VRATE: 25 ML/MIN (ref 70–130)
GLUCOSE SERPL-MCNC: 233 MG/DL (ref 83–110)
HGB BLD-MCNC: 7.4 G/DL (ref 12–16)
MCH RBC QN AUTO: 34.1 PG (ref 27–31)
MCV RBC AUTO: 101 FL (ref 78–98)
MDIFF COMPLETE?: YES
PLATELET # BLD AUTO: 108 THOU/UL (ref 130–400)
POTASSIUM SERPL-SCNC: 5.2 MMOL/L (ref 3.5–5.1)
PROT UR STRIP.AUTO-MCNC: 100 MG/DL
RBC # BLD AUTO: 2.16 MILL/UL (ref 4.2–5.4)
RBC UR QL AUTO: (no result) HPF (ref 0–3)
SODIUM SERPL-SCNC: 138 MMOL/L (ref 136–145)
SODIUM UR-SCNC: (no result) MMOL/L
TARGETS BLD QL SMEAR: (no result) (100X)
WBC # BLD AUTO: 23.3 THOU/UL (ref 4.8–10.8)
WBC UR QL AUTO: (no result) HPF (ref 0–3)

## 2020-01-06 RX ADMIN — ASPIRIN AND EXTENDED-RELEASE DIPYRIDAMOLE SCH CAP: 25; 200 CAPSULE ORAL at 20:46

## 2020-01-06 RX ADMIN — FENTANYL CITRATE SCH MLS: 50 INJECTION, SOLUTION INTRAMUSCULAR; INTRAVENOUS at 15:46

## 2020-01-06 RX ADMIN — ALBUMIN HUMAN SCH GM: 250 SOLUTION INTRAVENOUS at 06:37

## 2020-01-06 RX ADMIN — ASPIRIN AND EXTENDED-RELEASE DIPYRIDAMOLE SCH CAP: 25; 200 CAPSULE ORAL at 09:54

## 2020-01-06 RX ADMIN — FAMOTIDINE SCH MG: 10 INJECTION, SOLUTION INTRAVENOUS at 09:54

## 2020-01-06 RX ADMIN — ALBUMIN HUMAN SCH GM: 250 SOLUTION INTRAVENOUS at 00:26

## 2020-01-06 RX ADMIN — ALBUMIN HUMAN SCH GM: 250 SOLUTION INTRAVENOUS at 12:47

## 2020-01-06 NOTE — PDOC.PALPN
Palliative Progress Note





- Objective


Vital Signs: 


 Vital Signs - Most Recent











Temp Pulse Resp BP Pulse Ox


 


 97.5 F L  81   22 H  159/49 H  96 


 


 01/06/20 07:00  01/06/20 10:06  01/06/20 10:05  01/06/20 10:06  01/06/20 10:05














- Physical Exam


Constitutional: encephalitic, ill appearing


HEENT: moist MMs, sclera anicteric


Deviation from normal: mechanical ventilation, diminished to bases, appear 

clear upper


Cardiovascular: RRR


Gastrointestinal: non-tender, positive bowel sounds, incontinent


Genitourinary: ash catheter


Musculoskeletal: edema present, muscle wasting


Deviation from normal: non focal


Skin: cap refill <2 seconds


Deviation from normal: non responsive





- Assessment


(1) Palliative care encounter


Code(s): Z51.5 - ENCOUNTER FOR PALLIATIVE CARE   Current Visit: Yes   Status: 

Acute   





(2) COPD with exacerbation


Code(s): J44.1 - CHRONIC OBSTRUCTIVE PULMONARY DISEASE W (ACUTE) EXACERBATION   

Current Visit: Yes   Status: Acute   





(3) Diastolic dysfunction


Code(s): I51.89 - OTHER ILL-DEFINED HEART DISEASES   Current Visit: Yes   Status

: Acute   





(4) Morbid obesity


Code(s): E66.01 - MORBID (SEVERE) OBESITY DUE TO EXCESS CALORIES   Current Visit

: Yes   Status: Acute   





(5) ISIS on CPAP


Code(s): G47.33 - OBSTRUCTIVE SLEEP APNEA (ADULT) (PEDIATRIC); Z99.89 - 

DEPENDENCE ON OTHER ENABLING MACHINES AND DEVICES   Current Visit: Yes   Status

: Acute   





(6) Physical deconditioning


Code(s): R53.81 - OTHER MALAISE   Current Visit: No   Status: Acute   





- Plan


Plan:


Family meeting with Patient , her son and two daughters and a 

grandchild. Dr Cueva reviewed current medical conditions, answering patient 

questions. 





Discussed goals of care:


 DNAR, no resuscitation measures 


 If patient requires dialysis family would at that time desire to transition to 

comfort measures


 Family at this time wish to continue with care, but if progress not made to a 

meaningful recovery will revisit comfort care as the  stated that he 

does not want his wife to     suffer. 


























[75] minutes spent on this encounter with >50% of the time in counseling and 

coordination of care.














- ROS


Non Response: due to endotracheal tube, due to mental status

## 2020-01-06 NOTE — PRG
DATE OF SERVICE:  01/06/2020



SUBJECTIVE:  She remains intubated on the vent, mildly sedated. 



She was intubated.  She has progressive respiratory failure, bilateral parotitis,

worsening renal failure, and sepsis. 



At this stage, x-ray was clear.



OBJECTIVE:  VITAL SIGNS:  Saturations are 97%, respirations 20, pulse 88, blood

pressure 130/80. 

GENERAL:  She is mildly sedated. 

CHEST:  No wheezing or crackles. 

CARDIAC:  Normal S1 and S2.  No gallops. 

ABDOMEN:  No mass.



IMPRESSION:  Respiratory failure, parotitis, morbid obesity, baseline probably

chronic obstructive pulmonary disease. 



PLAN:  We will hold off sedation.  She still got significant leukocytosis.  We will

wean when stable. 



To note, white count 23,000, H and H are 7 and 21.  BUN and creatinine are 125 and

3.12.  __________ appears to be prerenal. 



This is a one half hour critical care.  We will follow.







Job ID:  [id number]

## 2020-01-06 NOTE — CON
DATE OF CONSULTATION:  12/28/2019



REASON FOR CONSULTATION:  Dysphagia.



HISTORY OF PRESENT ILLNESS:  Ms. Myrtle Leach is a very pleasant 85-year-old

 female hospitalized approximately a week ago.  The patient was 
also

hospitalized because of dyspnea and coughing and  worsening of_ COPD.  On

admission, the patient has been seen by Dr. Jimmy Vera for Pulmonary 
evaluation.

His clinical impression was that she has COPD, possible asthma.  At the present

time, she is on steroids and  iv antibiotics..  The patient has been seen by 
Dr. Gonzales

because of kidney injury.  Apparently, she has had acute kidney injury 2 years 
ago.

She tells me  that her  kidneys are getting worse.  The patient tells me that 
she

has been having some difficulty swallowing solids.  The symptoms started

approximately  a week ago before she came to the hospital.  She denies any 
dysphagia

before that.  Dysphagia is to solid food.  When she eats solid food, it hangs 
up in

the upper esophagus and she has to cough and choke.  She has tried to do small 
bites of

food  , but she still had difficulty swallowing.  She is able to flush it down  
with drinking water.  She has no

difficulty with drinking liquids.  . The patient has noted chronic acid

reflux.   The patient has had no previous dysphagia.   No abdominal pain, or 
nausea. 



MEDICAL ILLNESSES:  COPD.

CHRONIC KIDNEY DISEASE..

hYPERTENSION.

Hyperlipidemia.

Obesity.



PAST SURGICAL HISTORY:  Hysterectomy, cholecystectomy, bilateral knee 
replacement,

right  shoulder surgery, colonoscopy. 



ALLERGIES:  NONE.



SOCIAL HISTORY:  The patient does not smoke.



MEDICATIONS:  List reviewed.

1. Albuterol.

2. Allopurinol.

3. Aspirin.

4. Atorvastatin..

6. Prednisone.

.



ALLERGIES:  SHE SAYS THAT SHE IS ALLERGIC TO IODINE AND ZOSTER VACCINE.



FAMILY HISTORY:  No family history of any cancer, chronic kidney disease, or 
heart

disease. 



REVIEW OF SYSTEMS:  Ten-point system reviewed. 

CONSTITUTIONAL:  No history of any fever.  No weight loss.  No history of

weight loss. 

HEAD:  No chronic headache. 

EYES:  No impaired vision. 

EARS:  No hearing loss.  No discharge or any pain. 

NOSE:  No nosebleed. 

THROAT:  No sore throat. 

LUNGS:  History of coughing and mild dyspnea.  No hemoptysis. 

CARDIOVASCULAR:  No chest pain. No palpitation. No orthopnea. 

GI: History of dysphagia of recent onset.  Dysphagia is mostly to solid foods.  
No

odynophagia.  History of chronic acid reflux. No rectal bleeding. 

:   No dysuria. 

MUSCULOSKELETAL: History of back pain  and arthralgia 

NEUROPSYCHIATRIC:  No depression or anciety.



PHYSICAL EXAMINATION:

GENERAL:  She is obese, appears comfortable.  She is awake, alert, and

communicative. 

VITAL SIGNS:  Pulse is  78. /76

HEENT:  Conjunctivae clear. 

NECK:  Supple.  No adenitis or thyromegaly. 

CARDIOVASCULAR:  First and second heart sounds heard. 

LUNGS: Have some scattered rhonchi. 

ABDOMEN:  Soft.  Abdomen is nontender.  Bowel sounds  heard. 

EXTREMITIES:  Reveal trace edema.



LABORATORY DATA:  CBC on 12/28/2019, WBC 9200, Hemoglobin 10.2, hematocrit  29.5
,

lymphocytes 7, monocytes 13.  Chemistry panel; sodium 133, potassium 4.6, 
chloride

102, bicarb  20, BUN is 112, creatinine is 2.62, glucose is 205, calcium 8.2. 



CLINICAL IMPRESSION:  

1. An 85-year-old  female with dyspnea and coughing and is on

antibiotics  AND IV_ steroids.  She is actually feeling better. 

2. Dysphagia of recent onset .

3. Chronic acid reflux .

4. Chronic kidney disease.  

5. Hypertension.



PLAN:  EGD and possible dilation.  she is agreeable.  I will make

further recommendation after EGD. 







Job ID:  [id number]



[facility]

## 2020-01-06 NOTE — PRG
DATE OF SERVICE:  01/06/2020



SERVICE:  Renal Medicine.



SUBJECTIVE:  Ms. Leach is an 85-year-old black female, followed up by the Renal

Service for her acute kidney injury on top of her chronic renal failure.  In the

last several days, renal function has been worsening.  She has had received albumin

infusion the last few days, but renal function continues to worsen.  There are

considerations that she might have developed an acute tubular necrosis.  We will

review a urine sediment and urine chemistry with this patient.  She also went to

acute respiratory failure.  For that reason, she was intubated and placed on

ventilator support.  She has an ongoing infectious process of unclear etiology,

although viral etiology is also being considered.  She is on empiric IV antibiotics. 



No acute events.



OBJECTIVE:  VITAL SIGNS:  Blood pressure 141/49, heart rate 66, respiratory rate 13,

O2 saturation 96%. 

GENERAL:  Noted to be sedated, intubated, not in overt distress. 

SKIN:  Adequate turgor. 

HEENT:  She has slightly pale conjunctivae.  Anicteric sclerae. 

NECK:  No neck mass.  No carotid bruits.  No JVD. 

CHEST:  No deformities. 

LUNGS:  Harsh breath sounds. 

HEART:  Normal sinus rhythm.  No murmur.  No gallops.  No rubs. 

ABDOMEN:  Globular, soft, nontender.  No masses. 

EXTREMITIES:  No edema.  No deformities.



MEDICATIONS:  Medications of January 6, 2020, were reviewed.



LABORATORY DATA:  Laboratories of January 6, 2020; white count 23, hemoglobin 7.4.

Sodium 138, potassium 5.2, chloride 108, carbon dioxide 22, , creatinine

3.12, glucose 233, and calcium 9.2. 



ASSESSMENT AND PLAN:  

1. Acute kidney injury/chronic renal failure, worsening renal dysfunction.  We will

consider giving this patient normal saline and will run 8425 mL/h.  The last chest

x-ray did not show overt congestive heart failure, but showed some bilateral pleural

effusions. 

2. No indication for any dialytic intervention.

3. Acute respiratory failure.  Continue supportive care.

4. Leukocytosis.  The patient currently on empiric IV antibiotics.

5. We will check urinalysis and urine chemistry with this patient today.







Job ID:  [id number]

## 2020-01-06 NOTE — PDOC.HOSPP
- Subjective


Subjective: 





Seen and examined. Clinically unchanged. Remains intubated, sedated.





Family meeting was had with myself, Palliative care nurse practitioner, and all 

family members who could attend including  and power of  [place]. 

Time was given for questions, many questions are asked and all were answered in 

detail. Goals of care were addressed. Patient's family states that she would 

not want to live artificially and on machines if there was no chance of coming 

off. They state that they would like to change her categorization status to do 

not resuscitate. I explained that if do not resuscitate status is changed and 

the patient were to have cardiopulmonary arrest, she would , patient's 

family express understanding. They state that they would not want her to suffer 

anymore. I brought up the topic of dialysis they state that they would not be 

interested in hemodialysis. They would like a few more days to see how she does 

before proceeding with comfort care measures. If the patient does not 

clinically improved and she gets to the point where she needs hemodialysis they 

would elect for comfort care measures. Will continue to follow and aid as able.





- Objective


Vital Signs & Weight: 


 Vital Signs (12 hours)











  Temp Pulse Resp BP Pulse Ox


 


 20 14:00    36 H  


 


 20 12:00    35 H  


 


 20 11:00  98 F    


 


 20 10:06   81   159/49 H 


 


 20 10:05   85  22 H   96


 


 20 10:00    21 H  


 


 20 09:54   64   152/48 H 


 


 20 08:00    12   97


 


 20 07:00  97.5 F L    


 


 20 06:31   64   152/48 H 


 


 20 06:30   60  12   94 L


 


 20 06:00    15  


 


 20 04:00  98.7 F   16  








 Weight











Admit Weight                   243 lb


 


Weight                         260 lb 5.855 oz











 Most Recent Monitor Data











Heart Rate from ECG            113


 


NIBP                           169/65


 


NIBP BP-Mean                   99


 


Respiration from ECG           47


 


SpO2                           93














I&O: 


 











 20





 06:59 06:59 06:59


 


Intake Total 2367.5 1983 100


 


Output Total 770 775 255


 


Balance 1597.5 1208 -155











Result Diagrams: 


 20 05:30





 20 05:30


Radiology Reviewed by me: Yes





Hospitalist ROS





- Review of Systems


All other systems reviewed; all pertinent +/- noted in HPI/Subj





- Medication


Medications: 


Active Medications











Generic Name Dose Route Start Last Admin





  Trade Name Freq  PRN Reason Stop Dose Admin


 


Acetaminophen  650 mg  19 16:05  20 08:53





  Tylenol  PO   650 mg





  Q4H PRN   Administration





  Headache/Fever/Mild Pain (1-3)   





     





     





     


 


Albuterol/Ipratropium  3 ml  19 22:30  20 10:05





  Duoneb  EZPAP   3 ml





  [id number] CORRINE   Administration





     





     





     





     


 


Carvedilol  25 mg  19 21:00  20 09:54





  Coreg  PO   25 mg





  BID CORRINE   Administration





     





     





     





     


 


Clonidine  0.3 mg  19 09:00  20 10:30





  Catapres-Tts-3  TD   Not Given





  Q7DAYS CORRINE   





     





     





     





     


 


Dipyridamole/Aspirin  1 cap  19 21:00  20 09:54





  Aggrenox  PO   1 cap





  BID CORRINE   Administration





     





     





     





     


 


Enoxaparin Sodium  30 mg  20 09:00  20 09:54





  Lovenox  SC   30 mg





  0900 CORRINE   Administration





     





     





     





     


 


Famotidine  20 mg  20 09:00  20 09:54





  Pepcid  SLOW IVP   20 mg





  DAILY CORRINE   Administration





     





     





     





     


 


Hydralazine HCl  10 mg  20 13:43  20 04:09





  Apresoline  SLOW IVP   10 mg





  Q4H PRN   Administration





  Hypertension SBP>180   





     





     





     


 


Hydralazine HCl  100 mg  20 15:00  20 09:54





  Apresoline  PO   100 mg





  TID CORRINE   Administration





     





     





     





     


 


Cefepime HCl 2 gm/ Sodium  100 mls @ 200 mls/hr  20 22:30  20 21:42





  Chloride  IVPB  20 22:59  100 mls





  2230 CORRINE   Administration





     





     





     





     


 


Fentanyl Citrate 2,000 mcg/  100 mls @ 0 mls/hr  20 21:13  20 01:00





  Sodium Chloride  IV  20 21:13  100 mls





  INF CORRINE   Administration





     





     





  Protocol   





  Per Protocol   


 


Sodium Chloride  1,000 mls @ 125 mls/hr  20 09:15  20 10:00





  Normal Saline 0.9%  IV   1,000 mls





  .Q8H CORRINE   Administration





     





     





     





     


 


Methylprednisolone Sodium Succinate  20 mg  20 09:00  20 10:02





  Solu-Medrol  IVP   20 mg





  BID CORRINE   Administration





     





     





     





     


 


Propofol  1,000 mg  20 21:13  20 04:03





  Diprivan  IV  20 21:13  1,000 mg





  INF PRN   Administration





  TO ACHIEVE GOAL RASS   





     





  Protocol   





     














- Exam


General Appearance: ill appearing


Eye: anicteric sclera


ENT: normocephalic atraumatic, moist mucosa


Neck: supple, symmetric, no lymphadenopathy


Heart: no murmur, no gallops, no rubs


Respiratory: no rales, normal chest expansion, no tachypnea, rhonchi, wheezes


Gastrointestinal: soft, non-tender, non-distended, no guarding, no rigidity


Extremities: 1+ LE edema


Skin: no lesions, no rashes


Neurological - other findings: Limited secondary to clinical condition


Musculoskeletal: generalized weakness


Psychiatric: not oriented





Hosp A/P


(1) COPD with exacerbation


Code(s): J44.1 - CHRONIC OBSTRUCTIVE PULMONARY DISEASE W (ACUTE) EXACERBATION   

Status: Acute   





(2) Diastolic dysfunction


Code(s): I51.89 - OTHER ILL-DEFINED HEART DISEASES   Status: Acute   





(3) Elevated troponin


Code(s): R79.89 - OTHER SPECIFIED ABNORMAL FINDINGS OF BLOOD CHEMISTRY   Status

: Acute   





(4) Gout


Code(s): M10.9 - GOUT, UNSPECIFIED   Status: Acute   





(5) Moderate mitral regurgitation


Code(s): I34.0 - NONRHEUMATIC MITRAL (VALVE) INSUFFICIENCY   Status: Acute   





(6) Morbid obesity


Code(s): E66.01 - MORBID (SEVERE) OBESITY DUE TO EXCESS CALORIES   Status: 

Acute   





(7) ISIS on CPAP


Code(s): G47.33 - OBSTRUCTIVE SLEEP APNEA (ADULT) (PEDIATRIC); Z99.89 - 

DEPENDENCE ON OTHER ENABLING MACHINES AND DEVICES   Status: Acute   





(8) Paroxysmal atrial fibrillation


Code(s): I48.0 - PAROXYSMAL ATRIAL FIBRILLATION   Status: Acute   





(9) Proteinuria


Code(s): R80.9 - PROTEINURIA, UNSPECIFIED   Status: Acute   





(10) RSV (respiratory syncytial virus infection)


Code(s): B97.4 - RESPIRATORY SYNCYTIAL VIRUS CAUSING DISEASES CLASSD ELSWHR   

Status: Acute   





(11) Lower extremity edema


Code(s): R60.0 - LOCALIZED EDEMA   Status: Acute   





(12) Physical deconditioning


Code(s): R53.81 - OTHER MALAISE   Status: Acute   





(13) Pre-syncope


Status: Acute   





(14) CKD (chronic kidney disease) stage 3, GFR 30-59 ml/min


Code(s): N18.3 - CHRONIC KIDNEY DISEASE, STAGE 3 (MODERATE)   Status: Chronic   





(15) Dyslipidemia


Code(s): E78.5 - HYPERLIPIDEMIA, UNSPECIFIED   Status: Chronic   





(16) Falls


Code(s): W19.XXXA - UNSPECIFIED FALL, INITIAL ENCOUNTER   Status: Chronic   


Qualifiers: 


   Encounter type: subsequent encounter   Qualified Code(s): W19.XXXD - 

Unspecified fall, subsequent encounter   





(17) HTN (hypertension)


Code(s): I10 - ESSENTIAL (PRIMARY) HYPERTENSION   Status: Chronic   


Qualifiers: 


   Hypertension type: essential hypertension   Qualified Code(s): I10 - 

Essential (primary) hypertension   





(18) Hypertension


Code(s): I10 - ESSENTIAL (PRIMARY) HYPERTENSION   Status: Chronic   





(19) Obesity


Code(s): E66.9 - OBESITY, UNSPECIFIED   Status: Chronic   


Qualifiers: 


   Obesity classification: adult class 3 (BMI >= 40)   Body mass index: BMI 40.0

-44.9 





(20) Acute kidney injury


Code(s): N17.9 - ACUTE KIDNEY FAILURE, UNSPECIFIED   Status: Resolved   





(21) Constipation


Code(s): K59.00 - CONSTIPATION, UNSPECIFIED   Status: Resolved   





(22) Hypertensive urgency, malignant


Code(s): I16.0 - HYPERTENSIVE URGENCY   Status: Resolved   





(23) Metabolic encephalopathy


Code(s): G93.41 - METABOLIC ENCEPHALOPATHY   Status: Resolved   





- Plan





Plan:


ICU


Pulm/ CC consultation, recommendations appreciated


Infectious disease consultation, recommendations appreciated


ENT consultation, recommendations appreciated


nephrology consultation, recommendations appreciated


Palliative care consultation, recommendations appreciated


Family meeting on 2020 - family would like to change status to DNR


Family not interested in HD, and if she continues to worsen and HD is required 

they would elect for comfort measures


Family wish to continue other medical therapy, no HD or resuscitation 


Vent weaning as able


Maintain MAP >65


ABX per ID


De escalate to culture and sensitivity as able


Bilateral neck swelling diagnosed as Parotitis, with expressed purulent 

discharge, culture sent and noted


RSV


acute kidney injury on chronic kidney disease - Worse


blood pressure not controlled, adjust medications as appropriate by nephrology


blood pressure control


blood sugar control


Replace electrolytes as needed


G.I. prophylaxis


DVT prophylaxis





Dispostion: Family meeting on 2020 - family have elected for DNR status. 

Prognosis guarded.

## 2020-01-07 ENCOUNTER — HOSPITAL ENCOUNTER (INPATIENT)
Dept: HOSPITAL 92 - T4-A | Age: 85
LOS: 2 days | DRG: 951 | End: 2020-01-09
Attending: INTERNAL MEDICINE | Admitting: FAMILY MEDICINE
Payer: COMMERCIAL

## 2020-01-07 VITALS — DIASTOLIC BLOOD PRESSURE: 69 MMHG | SYSTOLIC BLOOD PRESSURE: 193 MMHG

## 2020-01-07 VITALS — BODY MASS INDEX: 38 KG/M2

## 2020-01-07 VITALS — TEMPERATURE: 98.5 F

## 2020-01-07 DIAGNOSIS — I12.9: ICD-10-CM

## 2020-01-07 DIAGNOSIS — R65.21: ICD-10-CM

## 2020-01-07 DIAGNOSIS — N17.9: ICD-10-CM

## 2020-01-07 DIAGNOSIS — G47.33: ICD-10-CM

## 2020-01-07 DIAGNOSIS — J44.1: ICD-10-CM

## 2020-01-07 DIAGNOSIS — E78.5: ICD-10-CM

## 2020-01-07 DIAGNOSIS — I10: ICD-10-CM

## 2020-01-07 DIAGNOSIS — I16.0: ICD-10-CM

## 2020-01-07 DIAGNOSIS — Z51.5: Primary | ICD-10-CM

## 2020-01-07 DIAGNOSIS — E66.01: ICD-10-CM

## 2020-01-07 DIAGNOSIS — B97.4: ICD-10-CM

## 2020-01-07 DIAGNOSIS — J18.9: ICD-10-CM

## 2020-01-07 DIAGNOSIS — G92: ICD-10-CM

## 2020-01-07 DIAGNOSIS — I48.0: ICD-10-CM

## 2020-01-07 DIAGNOSIS — J96.00: ICD-10-CM

## 2020-01-07 DIAGNOSIS — Z66: ICD-10-CM

## 2020-01-07 DIAGNOSIS — K62.89: ICD-10-CM

## 2020-01-07 DIAGNOSIS — Z86.73: ICD-10-CM

## 2020-01-07 DIAGNOSIS — A41.9: ICD-10-CM

## 2020-01-07 DIAGNOSIS — M10.9: ICD-10-CM

## 2020-01-07 DIAGNOSIS — E87.1: ICD-10-CM

## 2020-01-07 DIAGNOSIS — I34.0: ICD-10-CM

## 2020-01-07 DIAGNOSIS — N18.9: ICD-10-CM

## 2020-01-07 LAB
ANION GAP SERPL CALC-SCNC: 14 MMOL/L (ref 10–20)
BUN SERPL-MCNC: 134 MG/DL (ref 9.8–20.1)
CALCIUM SERPL-MCNC: 8.6 MG/DL (ref 7.8–10.44)
CHLORIDE SERPL-SCNC: 111 MMOL/L (ref 98–107)
CO2 SERPL-SCNC: 21 MMOL/L (ref 23–31)
CREAT CL PREDICTED SERPL C-G-VRATE: 20 ML/MIN (ref 70–130)
GLUCOSE SERPL-MCNC: 222 MG/DL (ref 83–110)
HGB BLD-MCNC: 8 G/DL (ref 12–16)
MCH RBC QN AUTO: 33 PG (ref 27–31)
MCV RBC AUTO: 99.3 FL (ref 78–98)
MDIFF COMPLETE?: YES
PLATELET # BLD AUTO: 108 THOU/UL (ref 130–400)
POTASSIUM SERPL-SCNC: 5.1 MMOL/L (ref 3.5–5.1)
RBC # BLD AUTO: 2.43 MILL/UL (ref 4.2–5.4)
SODIUM SERPL-SCNC: 141 MMOL/L (ref 136–145)
TARGETS BLD QL SMEAR: (no result) (100X)
WBC # BLD AUTO: 22.5 THOU/UL (ref 4.8–10.8)

## 2020-01-07 RX ADMIN — ASPIRIN AND EXTENDED-RELEASE DIPYRIDAMOLE SCH CAP: 25; 200 CAPSULE ORAL at 08:36

## 2020-01-07 RX ADMIN — FAMOTIDINE SCH MG: 10 INJECTION, SOLUTION INTRAVENOUS at 08:37

## 2020-01-07 NOTE — PDOC.HOSPP
- Subjective


Subjective: 





Seen and examined. No clinical improvement. Labs continue to worsen. I discuss 

the case with the patient's , he is understanding of the poor prognosis. 

Time was given for questions, all questions answered in detail.





- Objective


Vital Signs & Weight: 


 Vital Signs (12 hours)











  Temp Pulse Resp BP Pulse Ox


 


 01/07/20 12:00  98.5 F     94 L


 


 01/07/20 10:20   88  25 H   95


 


 01/07/20 10:00    20  


 


 01/07/20 09:10   68   193/69 H 


 


 01/07/20 08:37   68   193/69 H 


 


 01/07/20 08:00  98.7 F   17   97


 


 01/07/20 06:28   68   188/65 H 


 


 01/07/20 06:27   72  19   97


 


 01/07/20 06:00    15  


 


 01/07/20 05:57   74   179/69 H 


 


 01/07/20 04:00  98.9 F   18  


 


 01/07/20 02:55   67   142/59 H 


 


 01/07/20 02:00    16  








 Weight











Admit Weight                   243 lb


 


Weight                         220 lb 10.923 oz











 Most Recent Monitor Data











Heart Rate from ECG            78


 


NIBP                           152/49


 


NIBP BP-Mean                   83


 


Respiration from ECG           39


 


SpO2                           92














I&O: 


 











 01/06/20 01/07/20 01/08/20





 06:59 06:59 06:59


 


Intake Total 1983 3690 169


 


Output Total 775 990 350


 


Balance 1208 2700 -181











Result Diagrams: 


 01/07/20 06:00





 01/07/20 06:00


Radiology Reviewed by me: Yes





Hospitalist ROS





- Review of Systems


All other systems reviewed; all pertinent +/- noted in HPI/Subj





- Medication


Medications: 


Active Medications











Generic Name Dose Route Start Last Admin





  Trade Name Freq  PRN Reason Stop Dose Admin


 


Acetaminophen  650 mg  12/21/19 16:05  01/01/20 08:53





  Tylenol  PO   650 mg





  Q4H PRN   Administration





  Headache/Fever/Mild Pain (1-3)   





     





     





     


 


Albuterol/Ipratropium  3 ml  12/21/19 22:30  01/07/20 06:27





  Duoneb  EZPAP   3 ml





  [id number] CORRINE   Administration





     





     





     





     


 


Carvedilol  25 mg  12/21/19 21:00  01/07/20 08:37





  Coreg  PO   25 mg





  BID CORRINE   Administration





     





     





     





     


 


Clonidine  0.3 mg  12/26/19 09:00  01/02/20 10:30





  Catapres-Tts-3  TD   Not Given





  Q7DAYS [facility]   





     





     





     





     


 


Dipyridamole/Aspirin  1 cap  12/21/19 21:00  01/07/20 08:36





  Aggrenox  PO   1 cap





  BID CORRINE   Administration





     





     





     





     


 


Enoxaparin Sodium  30 mg  01/03/20 09:00  01/07/20 08:37





  Lovenox  SC   30 mg





  0900 CORRINE   Administration





     





     





     





     


 


Famotidine  20 mg  01/03/20 09:00  01/07/20 08:37





  Pepcid  SLOW IVP   20 mg





  DAILY CORRINE   Administration





     





     





     





     


 


Hydralazine HCl  10 mg  01/02/20 13:43  01/07/20 09:10





  Apresoline  SLOW IVP   10 mg





  Q4H PRN   Administration





  Hypertension SBP>180   





     





     





     


 


Hydralazine HCl  100 mg  01/04/20 15:00  01/07/20 08:37





  Apresoline  PO   100 mg





  TID CORRINE   Administration





     





     





     





     


 


Nicardipine HCl 25 mg/ Sodium  260 mls @ 0 mls/hr  01/02/20 13:45  01/07/20 09:

30





  Chloride  IVPB   260 mls





  INF CORRINE   Administration





     





     





  Protocol   





  Titrate   


 


Cefepime HCl 2 gm/ Sodium  100 mls @ 200 mls/hr  01/02/20 22:30  01/06/20 22:34





  Chloride  IVPB  01/08/20 22:59  100 mls





  2230 CORRINE   Administration





     





     





     





     


 


Sodium Chloride  1,000 mls @ 125 mls/hr  01/06/20 09:15  01/07/20 11:06





  Normal Saline 0.9%  IV   Not Given





  .Q8H CORRINE   





     





     





     





     


 


Labetalol HCl  10 mg  01/02/20 13:43  01/07/20 05:57





  Normodyne  SLOW IVP   10 mg





  Q4H PRN   Administration





  SBP Greater Than 180   





     





     





     


 


Methylprednisolone Sodium Succinate  20 mg  01/03/20 09:00  01/07/20 08:37





  Solu-Medrol  IVP   20 mg





  BID CORRINE   Administration





     





     





     





     


 


Scopolamine  3 mg  01/07/20 12:00  01/07/20 12:56





  Transderm Scop  TD   3 mg





  Q3D CORRINE   Administration





     





     





     





     














- Exam


General Appearance: NAD


Eye: anicteric sclera


ENT: normocephalic atraumatic, moist mucosa


Neck: supple, symmetric, no lymphadenopathy


Heart: no murmur, no gallops, no rubs


Respiratory: no rales, no ronchi, wheezes


Gastrointestinal: soft, non-tender, non-distended, no guarding, no rigidity


Extremities: no edema


Skin: no lesions, no rashes


Neurological - other findings: Limited secondary to clinical condition


Musculoskeletal: generalized weakness


Psychiatric: not oriented





Hosp A/P


(1) COPD with exacerbation


Code(s): J44.1 - CHRONIC OBSTRUCTIVE PULMONARY DISEASE W (ACUTE) EXACERBATION   

Status: Acute   





(2) Diastolic dysfunction


Code(s): I51.89 - OTHER ILL-DEFINED HEART DISEASES   Status: Acute   





(3) Elevated troponin


Code(s): R79.89 - OTHER SPECIFIED ABNORMAL FINDINGS OF BLOOD CHEMISTRY   Status

: Acute   





(4) Gout


Code(s): M10.9 - GOUT, UNSPECIFIED   Status: Acute   





(5) Moderate mitral regurgitation


Code(s): I34.0 - NONRHEUMATIC MITRAL (VALVE) INSUFFICIENCY   Status: Acute   





(6) Morbid obesity


Code(s): E66.01 - MORBID (SEVERE) OBESITY DUE TO EXCESS CALORIES   Status: 

Acute   





(7) ISIS on CPAP


Code(s): G47.33 - OBSTRUCTIVE SLEEP APNEA (ADULT) (PEDIATRIC); Z99.89 - 

DEPENDENCE ON OTHER ENABLING MACHINES AND DEVICES   Status: Acute   





(8) Paroxysmal atrial fibrillation


Code(s): I48.0 - PAROXYSMAL ATRIAL FIBRILLATION   Status: Acute   





(9) Proteinuria


Code(s): R80.9 - PROTEINURIA, UNSPECIFIED   Status: Acute   





(10) RSV (respiratory syncytial virus infection)


Code(s): B97.4 - RESPIRATORY SYNCYTIAL VIRUS CAUSING DISEASES CLASSD ELSWHR   

Status: Acute   





(11) Lower extremity edema


Code(s): R60.0 - LOCALIZED EDEMA   Status: Acute   





(12) Physical deconditioning


Code(s): R53.81 - OTHER MALAISE   Status: Acute   





(13) Pre-syncope


Status: Acute   





(14) CKD (chronic kidney disease) stage 3, GFR 30-59 ml/min


Code(s): N18.3 - CHRONIC KIDNEY DISEASE, STAGE 3 (MODERATE)   Status: Chronic   





(15) Dyslipidemia


Code(s): E78.5 - HYPERLIPIDEMIA, UNSPECIFIED   Status: Chronic   





(16) Falls


Code(s): W19.XXXA - UNSPECIFIED FALL, INITIAL ENCOUNTER   Status: Chronic   


Qualifiers: 


   Encounter type: subsequent encounter   Qualified Code(s): W19.XXXD - 

Unspecified fall, subsequent encounter   





(17) HTN (hypertension)


Code(s): I10 - ESSENTIAL (PRIMARY) HYPERTENSION   Status: Chronic   


Qualifiers: 


   Hypertension type: essential hypertension   Qualified Code(s): I10 - 

Essential (primary) hypertension   





(18) Hypertension


Code(s): I10 - ESSENTIAL (PRIMARY) HYPERTENSION   Status: Chronic   





(19) Obesity


Code(s): E66.9 - OBESITY, UNSPECIFIED   Status: Chronic   


Qualifiers: 


   Obesity classification: adult class 3 (BMI >= 40)   Body mass index: BMI 40.0

-44.9 





(20) Acute kidney injury


Code(s): N17.9 - ACUTE KIDNEY FAILURE, UNSPECIFIED   Status: Resolved   





(21) Constipation


Code(s): K59.00 - CONSTIPATION, UNSPECIFIED   Status: Resolved   





(22) Hypertensive urgency, malignant


Code(s): I16.0 - HYPERTENSIVE URGENCY   Status: Resolved   





(23) Metabolic encephalopathy


Code(s): G93.41 - METABOLIC ENCEPHALOPATHY   Status: Resolved   





- Plan





Plan:


ICU


Pulm/ CC consultation, recommendations appreciated


Infectious disease consultation, recommendations appreciated


ENT consultation, recommendations appreciated


nephrology consultation, recommendations appreciated


Palliative care consultation, recommendations appreciated


Family meeting on 1/6/2020 - family would like to change status to DNR


Family not interested in HD, and if she continues to worsen and HD is required 

they would elect for comfort measures


Family wish to continue other medical therapy, no HD or resuscitation 


Vent weaning as able


Maintain MAP >65


ABX per ID


De escalate to culture and sensitivity as able


Bilateral neck swelling diagnosed as Parotitis, with expressed purulent 

discharge, culture sent and noted


RSV


acute kidney injury on chronic kidney disease - Worse


blood pressure not controlled, adjust medications as appropriate by nephrology


blood pressure control


blood sugar control


Replace electrolytes as needed


G.I. prophylaxis


DVT prophylaxis





Dispostion: Family meeting on 1/6/2020 - family have elected for DNR status. 

Prognosis guarded.

## 2020-01-07 NOTE — PRG
DATE OF SERVICE:  



This morning she is intubated in the vent, sedated.



PHYSICAL EXAMINATION:

VITAL SIGNS:  Pulse 75, sat 98%, respiratory rate 18, and blood pressure

_130\72_.  Is and O's have been consistently ahead. 

CHEST:  Decreased breath sounds with bilateral rhonchi. 

CARDIAC:  Normal S1, S2.  No gallops. 

ABDOMEN:  No masses.



LABORATORY DATA:  White count 22,000, hemoglobin, 8 hematocrit 24, and platelet

count is low 108.  BUN and creatinine 134, 3.3. 



ASSESSMENT AND PLAN:  

1. sputum growing Staphylococcus aureus. Bilateral parotid infection secondary

to Staphylococcus aureus. 

2. Respiratory failure.

3. Severe deconditioning.

4. Encephalopathy.

5. Do not resuscitate.

6. Renal failure. 

Family has made her do not resuscitate.  At this stage, she is not weanable.  I 
may

consider extubation and comfort care if family is agreeable. 



In the meantime continue steroids.  Continue broad-spectrum antibiotics. 



Supportive care.  We will follow. 



One-half hour of critical time.







Job ID:  [id number]



[facility]

## 2020-01-07 NOTE — RAD
XR Chest 1 View Portable



History: Ventilated patient



Comparison: Radiograph January 4, 2020



Findings: Left subclavian central venous catheter is similar as not definitively intravenous. Endotra
cheal tube tip just below the level of clavicles in good position. Enteric tube tip below diaphragm

although out of field of view.



There appear to be bilateral layering pleural effusions. Heart size is enlarged. Mild pulmonary venou
s congestion. No pneumothorax.



Impression: 

1. Endotracheal tube tip just below the level of the clavicles in good position.

2. Central venous catheter tip not definitively venous in position although could be due to patient's
 leftward rotation. Recommend correlation with blood gas.

3. Concern for layering bilateral pleural effusions.

4. Partial collapse left lower lobe.

5. Mild pulmonary venous congestion and cardiomegaly.



Reported By: Beck Castaneda 

Electronically Signed:  1/7/2020 9:10 AM

## 2020-01-07 NOTE — PDOC.PALPN
Palliative Progress Note





- Subjective





extubated, non responsive. 





- Objective


Vital Signs: 


 Vital Signs - Most Recent











Temp Pulse Resp BP Pulse Ox


 


 98.5 F   88   25 H  193/69 H  94 L


 


 01/07/20 12:00  01/07/20 10:20  01/07/20 10:20  01/07/20 09:10  01/07/20 12:00














- Physical Exam


Constitutional: encephalitic, mild distress


HEENT: moist MMs, sclera anicteric


Respiratory: diminished lung sound, labored respirations


Deviation from normal: bilateral adventicious lung sounds to upper lobes


Cardiovascular: RRR


Gastrointestinal: non-tender, incontinent


Genitourinary: ash catheter


Musculoskeletal: edema present, diffuse muscle atrophy


Skin: cap refill <2 seconds, no lesions, no rash





- Assessment


(1) Palliative care encounter


Code(s): Z51.5 - ENCOUNTER FOR PALLIATIVE CARE   Current Visit: Yes   Status: 

Acute   





(2) COPD with exacerbation


Code(s): J44.1 - CHRONIC OBSTRUCTIVE PULMONARY DISEASE W (ACUTE) EXACERBATION   

Current Visit: Yes   Status: Acute   





(3) Diastolic dysfunction


Code(s): I51.89 - OTHER ILL-DEFINED HEART DISEASES   Current Visit: Yes   Status

: Acute   





(4) Morbid obesity


Code(s): E66.01 - MORBID (SEVERE) OBESITY DUE TO EXCESS CALORIES   Current Visit

: Yes   Status: Acute   





(5) ISIS on CPAP


Code(s): G47.33 - OBSTRUCTIVE SLEEP APNEA (ADULT) (PEDIATRIC); Z99.89 - 

DEPENDENCE ON OTHER ENABLING MACHINES AND DEVICES   Current Visit: Yes   Status

: Acute   





(6) Physical deconditioning


Code(s): R53.81 - OTHER MALAISE   Current Visit: No   Status: Acute   





- Plan


Plan:


Secondary to renal function decline as well as continued decline family opted 

to compassionately extubate patient and seek hospice care to mitigate symptoms, 

providing comfort at end of life. 





*Family at bedside, awaiting Hospice. Called [facility] hospice and confirmed 

admission time of aprox 3:30


*Ordered Scopolamine patch for increase in oral secretions, Ativan and morphine 

for comfort measures


*Emotional support provided for family




















[50] minutes spent on this encounter with >50% of the time in counseling and 

coordination of care.














- ROS


Non Response: due to mental status

## 2020-01-08 NOTE — PDOC.HOSPP
- Subjective


Subjective: 





Seen and examined this a.m. Surrounded by her family and loved ones. Patient 

with rapid short shallow breaths with Cheyne-Hamilton pattern. Time was given for 

questions, all questions answered in detail.





- Objective


Vital Signs & Weight: 


 Vital Signs (12 hours)











  Temp Pulse Resp BP Pulse Ox


 


 20 08:00      98


 


 20 07:48  98.0 F  72  24 H  141/71 H  98








 Weight











Weight                         243 lb














I&O: 


 











 20





 06:59 06:59 06:59


 


Output Total  650 


 


Balance  -650 











Radiology Reviewed by me: Yes





Hospitalist ROS





- Review of Systems


ROS unobtainable: due to mental status





- Medication


Medications: 


Active Medications











Generic Name Dose Route Start Last Admin





  Trade Name Freq  PRN Reason Stop Dose Admin


 


Fentanyl  50 mcg  20 17:32  20 06:18





  Sublimaze  SLOW IVP   50 mcg





  Q1H PRN   Administration





  PAIN/SOB   





     





     





     


 


Lorazepam  2 mg  20 17:30  20 17:56





  Ativan  SLOW IVP   2 mg





  Q2H PRN   Administration





  FOR AGITATION OR RESTLESSNESS   





     





     





     














- Exam


General Appearance: ill appearing


General - other findings: Resting, does not appear to be in pain or distress 

whatsoever


Eye: anicteric sclera


ENT: normocephalic atraumatic, moist mucosa


ENT - other findings: Parotid swelling improved


Neck: supple, symmetric, no lymphadenopathy


Heart: no murmur, no gallops, no rubs


Respiratory: rhonchi, tachypneic, wheezes


Gastrointestinal: soft, non-tender, no guarding, no rigidity


Extremities: 2+ LE edema


Skin: no lesions, no rashes


Neurological: cranial nerve grossly intact


Musculoskeletal: generalized weakness


Psychiatric: normal behavior, not oriented





Hosp A/P


(1) Altered mental status


Code(s): R41.82 - ALTERED MENTAL STATUS, UNSPECIFIED   Status: Acute   





(2) COPD with exacerbation


Code(s): J44.1 - CHRONIC OBSTRUCTIVE PULMONARY DISEASE W (ACUTE) EXACERBATION   

Status: Acute   





(3) Diastolic dysfunction


Code(s): I51.89 - OTHER ILL-DEFINED HEART DISEASES   Status: Acute   





(4) Dysphagia


Code(s): R13.10 - DYSPHAGIA, UNSPECIFIED   Status: Acute   





(5) Elevated troponin


Code(s): R79.89 - OTHER SPECIFIED ABNORMAL FINDINGS OF BLOOD CHEMISTRY   Status

: Acute   





(6) Gout


Code(s): M10.9 - GOUT, UNSPECIFIED   Status: Acute   





(7) Lower extremity edema


Code(s): R60.0 - LOCALIZED EDEMA   Status: Acute   





(8) Moderate mitral regurgitation


Code(s): I34.0 - NONRHEUMATIC MITRAL (VALVE) INSUFFICIENCY   Status: Acute   





(9) Morbid obesity


Code(s): E66.01 - MORBID (SEVERE) OBESITY DUE TO EXCESS CALORIES   Status: 

Acute   





(10) ISIS on CPAP


Code(s): G47.33 - OBSTRUCTIVE SLEEP APNEA (ADULT) (PEDIATRIC); Z99.89 - 

DEPENDENCE ON OTHER ENABLING MACHINES AND DEVICES   Status: Acute   





(11) Oropharyngeal dysphagia


Code(s): R13.12 - DYSPHAGIA, OROPHARYNGEAL PHASE   Status: Acute   





(12) Paroxysmal atrial fibrillation


Code(s): I48.0 - PAROXYSMAL ATRIAL FIBRILLATION   Status: Acute   





(13) Physical deconditioning


Code(s): R53.81 - OTHER MALAISE   Status: Acute   





(14) Pre-syncope


Status: Acute   





(15) Proteinuria


Code(s): R80.9 - PROTEINURIA, UNSPECIFIED   Status: Acute   





(16) RSV (respiratory syncytial virus infection)


Code(s): B97.4 - RESPIRATORY SYNCYTIAL VIRUS CAUSING DISEASES CLASSD ELSWHR   

Status: Acute   





(17) Toxic encephalopathy


Code(s): G92 - TOXIC ENCEPHALOPATHY   Status: Acute   





(18) Dyslipidemia


Code(s): E78.5 - HYPERLIPIDEMIA, UNSPECIFIED   Status: Chronic   





(19) Falls


Code(s): W19.XXXA - UNSPECIFIED FALL, INITIAL ENCOUNTER   Status: Chronic   


Qualifiers: 


   Encounter type: subsequent encounter   Qualified Code(s): W19.XXXD - 

Unspecified fall, subsequent encounter   





(20) HTN (hypertension)


Code(s): I10 - ESSENTIAL (PRIMARY) HYPERTENSION   Status: Chronic   


Qualifiers: 


   Hypertension type: essential hypertension   Qualified Code(s): I10 - 

Essential (primary) hypertension   





(21) Hypertension


Code(s): I10 - ESSENTIAL (PRIMARY) HYPERTENSION   Status: Chronic   





(22) Acute kidney injury


Code(s): N17.9 - ACUTE KIDNEY FAILURE, UNSPECIFIED   Status: Resolved   





(23) Hypertensive urgency, malignant


Code(s): I16.0 - HYPERTENSIVE URGENCY   Status: Resolved   





(24) Hyponatremia


Code(s): E87.1 - HYPO-OSMOLALITY AND HYPONATREMIA   Status: Resolved   





(25) Metabolic encephalopathy


Code(s): G93.41 - METABOLIC ENCEPHALOPATHY   Status: Resolved   





- Plan





Plan:


medical unit


general inpatient hospice


palliative care consultation, recommendations appreciated


aggressive pain control to alleviate any pain and suffering


IV Fentanyl as needed


anxiety control


symptomatic treatment as needed


comfort care measures only


no more lab tests, blood draws, or invasive procedures


patient will  in acute care hospital

## 2020-01-08 NOTE — PRG
DATE OF SERVICE:  01/08/2020



SUBJECTIVE:  She is in the hospice care now.



OBJECTIVE:  VITAL SIGNS:  Temperature 98, pulse 72, respiratory rate 24, saturations

98% on supplemental oxygen, and blood pressure 141/71. 

GENERAL:  She appears to be in no respiratory distress. 

CHEST:  Decreased breath sounds.  No wheezing. 

CARDIAC:  Normal S1 and S2.  No gallops. 

ABDOMEN:  No masses.



IMPRESSION:  Respiratory failure, aspiration, encephalopathy, and sepsis syndrome.



PLAN:  Comfort care.  Hospice to follow up.  Pulmonary follow at a distance.







Job ID:  [id number]

## 2020-01-08 NOTE — PQF
SHERI OCHOA OBI, CHIZOBA C

[id number]                                                             [facility]-232

[id number]                             

                                   

CLINICAL DOCUMENTATION CLARIFICATION FORM:  POST DISCHARGE



Addendum to original discharge summary date:  __________________________________
____



Late entry note date:  _________________________________________________________
__







This should be send to Dr Cueva who treated and discharged patient. I cannot 
do addendum to him noted.



DATE:01/08/2020                                         ATTN:MARICHUY JUAN



Please exercise your independent, professional judgment in responding to the 
clarification form. 

Clinical indicators are provided on the bottom of this form for your review



Please check appropriate box(s):



Kindly clarify the diagnosis occasioning on admission;



[  ] Hypertensive urgency 

[  ] Acute bronchitis

[  ] Other diagnosis ___________

[  ] Unable to determine

In addition, please specify:

Present on Admission (POA):  [  ] Yes             [  ] No             [  ] 
Unable to determine



For continuity of documentation, please document condition throughout progress 
notes and discharge summary.  Thank You.



CLINICAL INDICATORS - SIGNS / SYMPTOMS / LABS



This is an 85 year old female with history of hypertension who presented to the 
ER with SOB on exertion , admitted for hypertensive urgency-Documented in 
Hospitalist H&P on 12/21 by Neva Luke MD

Hypertensive urgency ,elevated troponin-Documented in Hospitalist H&P on 12/21 
by Neva Luke MD

BP>249 on admission -Documented in Hospitalist H&P on 12/21 by Neva Luke MD

Acute bronchitis -Documented in Hospitalist H&P on 12/21 by Neva Luke MD

SOB secondary to acute bronchitis-Documented in PN on 12/22 by Jayme Gonzales MD



RISK FACTORS

Atrial fibrillation-Documented in Hospitalist H&P on 12/21 by Neva Luke MD

CHF-Documented in Hospitalist H&P on 12/21 by Neva Luke MD

Asthma-Documented in Consultation on 12/21 Jayme Gonzales MD

COPD-Documented in Consultation on 12/21 Jayme Gonzales MD





TREATMENTS:

She was given nitro paste, IV hydralazine, IV methylprednisolone and duoneb 
treatment-Documented in Hospitalist H&P on 12/21 by Neva Luke MD

IV steroids in the ER, order albuterol prn standing duoneb , mucinex, continue 
breo-Documented in Hospitalist H&P on 12/21 by Neva Luke MD

Empiric antibiotics also been started with the patient-Documented in 
Consultation on 12/21 Jayme Gonzales MD







[employer] Winform Viewer

(This form is maintained as a part of the permanent medical record)

2015 [email or web address].  All Rights Reserved

[email or web address]    [not 
provided]

                                                              



MTDD

## 2020-01-09 VITALS — TEMPERATURE: 98.4 F | SYSTOLIC BLOOD PRESSURE: 126 MMHG | DIASTOLIC BLOOD PRESSURE: 62 MMHG

## 2020-01-09 NOTE — PRG
DATE OF SERVICE:  01/08/2020



SUBJECTIVE:  Ms. Leach is an unfortunate 85-year-old black female on the Palliative

Service and Hospice Service for [facility].  She had before this hospital

admission independent at home.  She had a history of COPD and a history of some

level of congestive heart failure, likely more of a diastolic in nature, and she was

having an acute respiratory episode with her COPD and it was then found that she had

a renal problem with this as well.  Her kidneys were going into renal insufficiency.

Her heart was having more troubles with her diastolic function.  She was

metabolically in a sepsis situation and she has had metabolic encephalopathy.  She

has not woken up since the admission.  She had been intubated and got extubated

several days ago.  The expectation was she would decline rather rapidly, that has

not happened.  Her declination has been relatively slow, but they have stopped all

antibiotics.  She has a significant purulent Staph parotitis on the right side of

her face, but her vital signs show a temperature of 97.8, pulse 83, respirations of

20 to 24, O2 sats anywhere from 88 to 91.  She has a nasal cannula going to 2

L/minute. Her visits, in which she has had a most consistent lab work.  Her white

count initially was 50,000, then it dropped down to 22,000; her hemoglobin had been

at 11, dropped down to 7.4 and at 8.0 when last checked yesterday.  She has a

significant bandemia of 12 that dropped down to 9.  Her neutrophils are at 94%, thus

sepsis from the parotid is significant.  She is currently having no significant

respiratory distress, but she is having shallow breaths and occasional has almost a

Kussmaul type of breathing.  She is significantly overweight, morbidly obese, and

she is starting to have some mottling on her skin at her ankles,  __________ left

side of her body or pannus is starting to show some what looks like a clear blister

formation, likely related to the sepsis. 



ASSESSMENT AND PLAN:  Respiratory distress; respiratory failure, on hospice; has an

end-stage chronic obstructive pulmonary disease, chronic-to-severe renal

insufficiency, just prior to going on to hospice, the nephrologist did mention that

they were looking to having to start her on dialysis due to her renal failure, so

the plan for now is to follow guidelines and keep her comfortable as possible.  We

will re-evaluate daily with her breathing episodes in her metabolic encephalopathy

condition.  She is not able to respond to us, so we will control her pain and keep

her comfortable as possible 

__________.  We will re-evaluate daily and if she gets to a point where she is

considered stable enough with the expectation that she will be passing away that

day, then we will look at discharging the patient to home.  Family states they do

not want to go to a facility if she does need to leave the hospital. 







Job ID:  [id number]

## 2020-01-10 NOTE — DIS
DATE OF ADMISSION:  2020



DATE OF DISCHARGE:  2020



REASON FOR HOSPITALIZATION:  Shortness of breath.



SIGNIFICANT FINDINGS:  The patient had extensive findings including multiorgan

failure, RSD, acute kidney failure, acute respiratory failure, acute proctitis,

septic shock, and pneumonia. 



PROCEDURES PERFORMED AND TREATMENTS RENDERED:  The patient was admitted to the

hospital for close management.  The patient underwent endotracheal intubation on

2020.  The patient had maximum medical therapy throughout her

hospitalization-please see full hospital course for details. 



CONDITION ON DISCHARGE:  .



SPECIFIC INSTRUCTIONS FOR THE PATIENT/FAMILY:  

1. The patient is recommended safe for transfer to [facility] of family selection for

all future care. 

2. The patient's family is recommended to follow up with medical records if they

would like further documentation on medical records. 



DISCHARGE MEDICATIONS:  Not applicable.



HOSPITAL COURSE:  Ms. Leach is a very pleasant 85-year-old female, who presented to

[facility] in [place] on 2019, with shortness of breath.  The patient was

identified to have acute RSV infection and her pulmonologist, Dr. Christianson, was

consulted-please see full consultation notes and progress notes for details.  Please

see full history and physical from internist in addition to all progress notes for

details.  The patient has an extensive past medical history including hypertension,

atrial fibrillation, COPD, hyperlipidemia, transient ischemic attack, and elevated

BMI.  With the patient's shortness of breath and RSV, she was started on all

appropriate medical therapy which was titrated by her pulmonologist, Dr. Christianson.  The

patient did have some improvement in the initial course of her hospitalization.

However, the RSV continued to weaken her and sat her ability to work with Physical

Therapy and Occupational Therapy.  The patient developed swelling of the parotid

gland and ENT physician was consulted-please see full consultation notes for

details.  I discussed the case personally with Dr. Ravi, ENT physician, who

expressed purulent drainage from the parotid gland and this culture was sent to

Microbiology.  Please see full microbiology data for details.  Microbiology

concerning for both Pseudomonas and Staphylococcus aureus.  The patient was placed

on broad-spectrum IV antibiotics and Infectious Disease specialist was consulted.

Please see full consultation notes and progress notes from Infectious Disease

specialist for details.  Unfortunately, the patient continued to clinically

deteriorate and she had acute respiratory failure requiring intubation on

2020-please see full operative reports for details.  Once the patient was

intubated, she continued to decline daily.  The patient's sepsis turned into shock

and she required vasoactive medications to maintain adequate mean arterial

pressures.  After antibiotics had some time to work, the patient's vasoactive

medications were discontinued and she actually became hypertensive with blood

pressures greater than 200 systolic.  Nephrology was consulted for acute kidney

injury-please see full consultation notes and progress notes for details.

Nephrology adjusting blood pressure medications appropriately.  Unfortunately, the

patient's renal function continued to deteriorate and discussion was had about

hemodialysis.  I had a long and thorough discussion with the patient's family and a

family meeting on 2020 and the patient's daughter and power of  in

addition to her  and several other family members were present.  The

palliative care nurse practitioner, Danielle Stafford, was also in attendance, helping

to coordinate with meeting.  The patient's family stating that she would never want

to be kept alive artificially on machines and saying that she had never wanted to

undergo hemodialysis.  At that point, we asked the family what they would like to do

if the worse would happen and her heart were to stop where she would have a massive

heart attack, and they informed us that she would want to be allowed to pass

peacefully and naturally.  At that time, family elected to change her categorization

status to do not attempt resuscitate.  The patient was given additional time to see

if her renal function would improve.  However, unfortunately, it continued to

worsen.  Decision was made that it was time for hemodialysis and the patient's

family elected for a compassionate extubation.  The patient was extubated on

2020 and transferred to inpatient hospice.  I continued to follow the patient

for continuity of care after she was extubated.  The patient lingered on 2020

and into the morning of .  The pain medications were adjusted

appropriately by hospice physician.  The patient was in no pain and suffering, 

and passed peacefully of natural causes on 2020, surrounded by her loved ones.

 The patient's body recommended safe for transport to mortuary of family's choosing. 



Greater than 45 minutes spent coordinating care and discharge process.







Job ID:  [id number]

## 2022-05-03 NOTE — DIS
DATE OF ADMISSION:  2019



DATE OF DISCHARGE:  12/10/2019



DISCHARGE DIAGNOSES:  

1. Lower extremity edema.

2. Cellulitis of the lower leg, questionable.

3. Physical deconditioning.

4. Hypertension.

5. Obesity.

6. Secondary AV block.



HOSPITAL COURSE:  The patient is a very pleasant 85-year-old female, who initially

presented to the hospital with lower extremity swelling.  Please refer to the H and

P for further details.  She also had some blisters on her left lower extremity, at

which time, she had completed rounds of antibiotics.  She had no elevated

leukocytosis, no fevers, no chills.  She was put on initially broad-spectrum

antibiotics.  Her urine culture was negative.  She also started on diuretics and she

recently had a cardiac cath in October, which indicated just mild disease.

Cardiology also was consulted.  At this time, her Norvasc was discontinued which

could possibly cause her to have worsening lower extremity edema.  She did have some

mildly elevated creatinine.  She had acute kidney injury on her chronic kidney

injury.  Her creatinine was normally at baseline 1.67.  She went up to 2.35, which

improved to 2.29.  She has been taken off her lisinopril and her Lasix for now.  She

will recheck her lab work this week and then will be restarted on her medications

per her primary.  Her troponins initially were an indeterminate state, then actually

improved. 



In the hospital, she was noted to have some second-degree AV block.  It was unclear

if it was type 1 or type 2.  At this time, recommendation from Cardiology was to

follow up as outpatient.  Her Coreg at this time was increased. 



HOME MEDICATIONS:  Her home medications will be as of the followin. Allopurinol 300 mg daily.

2. Clonidine patch every 7 days.

3. Aspirin and dipyridamole one tablet b.i.d.

4. Isosorbide 60 mg b.i.d.

5. Lasix will be on hold.

6. Gabapentin 300 mg at bedtime.

7. Cymbalta 60 mg daily.

8. Atorvastatin 10 mg daily.

9. Coreg 25 mg p.o. b.i.d.



PHYSICAL EXAMINATION:

VITAL SIGNS:  Temperature of 98.3, pulse 65, 93% on room air, blood pressure 162/70. 

GENERAL:  She is awake, alert, and oriented x3.  Does not appear in distress. 

CV:  S1 and S2 present.  No murmurs, rubs, or gallops. 

ABDOMEN:  Soft and nontender.  Bowel sounds are present x2. 

EXTREMITIES:  No edema. 



Again, she will be discharged home.  She will follow up with her primary and also

with Cardiology. 







Job ID:  [id number] Pt does want to go back on those medications and does not want to go to therapy

## 2023-10-23 NOTE — CON
"Pt family at desk requesting nurse to go to bedside to discuss HD options. Family friend Dr.Terri Coffey at desk requesting that patient have a standing scale weight and be taken in a wheel chair outside for fresh air. Daughter and family member concerned about patient not receiving HD anymore. Family friend states that "Nephro said that they can just run her to clean her instead of pulling fluid off of her which seems to be to much." Nurse educated family on policy and procedure of patients not being able to leave floor. HD policy also reviewed with family as far as no family with pt while completing treatment.    Once family was no longer present, nurse sat at bedside and asked patient what her true wishes were and discussed disease process. Patient states at this time she would like to try HD one time only if daughter can be present at bedside. Medical team notified, patient verbalized understanding of disease process and possibly of death with HD. Will continue with POC.   " DATE OF CONSULTATION:  



REASON FOR CONSULTATION:  Right facial swelling and CT scan showing possible

infection or trauma to the right face. 



HISTORY OF PRESENT ILLNESS:  The patient presenting to the hospital and 
admitted for

several issues including shortness of breath, atrial fibrillation, hypertension 
from

a nursing facility and has several comorbidities including a concurrent leg 
wound.

The patient had a code green called on her and possibly had a fall to the right

face.  She did incur a stroke and she is not currently responding appropriately 
to

commands or conversing with family.  However, her potential fall was 
unwitnessed.  A

CT scan was completed to evaluate for musculoskeletal injury as well as soft 
tissue

trauma and no facial fractures are seen.  However, there was significant 
swelling to

the right face hence Head And Neck Surgery Otolaryngology consult was obtained. 



PAST MEDICAL HISTORY:  Please see HPI.



PAST SURGICAL HISTORY:  No current head/neck surgeries.



CURRENT MEDICATIONS:  Please see electronic medical record.



ALLERGIES:  NO KNOWN ALLERGIES.



SOCIAL AND FAMILY HISTORY:  The patient is currently in a nursing facility and

admitted to the CCU for further workup and care. 



REVIEW OF SYSTEMS:  

unable to obtain due to mental status



PHYSICAL EXAMINATION:

GENERAL:  The patient is opening eyes to voice or tactile stimuli.  However, the

patient is not oriented or responding appropriately to commands. 

HEAD AND FACE:  There are no lacerations or bony step-off.  However, the right 
face

is significantly edematous without overlying erythema or cellulitis.  The 
patient

has some significant tenderness to the right side of the face with grimacing 
with

palpation. 

EYES:  The patient's eyes open to voice stimuli or noxious stimuli as well as 
just

general tactile stimuli.  However, the patient is not participating in the 
physical

exam.  Right and left ears and EAC are clear with no signs of infection in the

external ear canals and tympanic membranes are intact without effusion. 

NOSE:  External nose is normal.  Mucous membranes are normal and turbinates are 
not

hypertrophied. 

ORAL CAVITY:  The patient has significantly poor dentition.  However, mucous

membranes are moist.  Tongue and floor of mouth are soft.  Right Stensen duct 
has

significant purulence when massaging or milking the right parotid gland. 

NECK:  Mild edema on the right side, however, no overlying cellulitis.  Trachea 
is midline.

There is no thyromegaly or masses in the central thyroid compartment. 

NEUROLOGIC:  The patient is not responding to commands or participating in the

physical exam, however, the patient is opening eyes to voice, as well as to 
noxious

stimuli. 



ASSESSMENT AND PLAN:  The patient is at nursing home.  The patient was admitted 
on

12/21 for shortness of breath, atrial fibrillation, hypertension, and has a 
history

of stroke and currently is having right facial swelling.  Given the location of 
the

swelling and the CT scan, significant concern for purulent parotitis and with

massaging the parotid gland, there was significant purulence expressed from the

right Stensen duct.  A sample and culture were taken and sent for culture and to

direct antibiotics.  However, given the most likely organism is Staph organism 
and

her history in a nursing facility recommend MRSA coverage and broadened empiric

coverage with broad-spectrum antibiotics for until cultures return.  The 
patient was

previously on clindamycin and with no significant benefit has been seen during 
the

short duration of that course of medication.  My recommendation is to milk the

parotid gland daily and if the patient is 

able to be alert and adequately control sialogogues, sugar free lemon drops will

help to increase salivary production and flush the gland duct.  However, given 
the

patient's 

current mental status, she will likely be unable and the patient will likely 
need to

have the parotid gland milked by nursing and family 3 to 5 times a day. 







Job ID:  [id number]



[facility]

## 2025-04-02 NOTE — PRG
DATE OF SERVICE:  12/09/2019



SUBJECTIVE:  Ms. Leach is stable and has no complaints at this time.  She was

initially admitted with increasing refractory edema to her lower extremities.  She

was given IV Lasix.  She has a history of mild renal insufficiency and developed

worsening creatinine over the last 24 hours.  Her Lasix has been held at this time.

She has no complaints. 



PHYSICAL EXAMINATION:

NEUROLOGIC:  The patient is alert, awake, and oriented x3. 

VITAL SIGNS:  Stable. 

HEENT:  Head is atraumatic, normocephalic.  Mucous membranes moist. 

NECK:  Supple.  No JVD or bruits noted. 

CHEST:  Clear bilaterally. 

CARDIOVASCULAR:  Regular rate and rhythm.  Normal S1, S2. 

ABDOMEN:  Soft, nontender to palpation.  Nondistended. 

EXTREMITIES:  Show no clubbing, cyanosis.  2+ edema bilaterally to lower extremities

present. 

MUSCULOSKELETAL:  Not fully assessed.  The patient is in bed, lying comfortably. 



Tele shows sinus rhythm.



IMPRESSION:  

1. Edema.

2. Acute renal insufficiency.

3. Hypertension.



PLAN:  At this time, I would continue holding Lasix.  We will need to adjust her

blood pressure medications further to improve control.  Repeat BMP in the morning.

Consider Nephrology consult if any progressive nephropathy. 







Job ID:  [id number]
Negative